# Patient Record
Sex: FEMALE | Race: BLACK OR AFRICAN AMERICAN | NOT HISPANIC OR LATINO | Employment: OTHER | URBAN - METROPOLITAN AREA
[De-identification: names, ages, dates, MRNs, and addresses within clinical notes are randomized per-mention and may not be internally consistent; named-entity substitution may affect disease eponyms.]

---

## 2018-11-08 ENCOUNTER — OFFICE VISIT (OUTPATIENT)
Dept: FAMILY MEDICINE | Facility: CLINIC | Age: 73
End: 2018-11-08
Payer: MEDICARE

## 2018-11-08 ENCOUNTER — HOSPITAL ENCOUNTER (OUTPATIENT)
Dept: RADIOLOGY | Facility: HOSPITAL | Age: 73
Discharge: HOME | End: 2018-11-08
Attending: NURSE PRACTITIONER
Payer: MEDICARE

## 2018-11-08 ENCOUNTER — OFFICE VISIT (OUTPATIENT)
Dept: ORTHOPEDICS | Facility: CLINIC | Age: 73
End: 2018-11-08
Payer: MEDICARE

## 2018-11-08 VITALS
OXYGEN SATURATION: 100 % | WEIGHT: 193.56 LBS | HEART RATE: 89 BPM | HEIGHT: 62 IN | SYSTOLIC BLOOD PRESSURE: 142 MMHG | DIASTOLIC BLOOD PRESSURE: 78 MMHG | BODY MASS INDEX: 35.62 KG/M2

## 2018-11-08 VITALS — BODY MASS INDEX: 35.51 KG/M2 | HEIGHT: 62 IN | WEIGHT: 193 LBS

## 2018-11-08 DIAGNOSIS — M25.561 ACUTE PAIN OF BOTH KNEES: ICD-10-CM

## 2018-11-08 DIAGNOSIS — Z78.0 POSTMENOPAUSAL: ICD-10-CM

## 2018-11-08 DIAGNOSIS — M25.562 ACUTE PAIN OF BOTH KNEES: ICD-10-CM

## 2018-11-08 DIAGNOSIS — E66.09 CLASS 2 OBESITY DUE TO EXCESS CALORIES WITHOUT SERIOUS COMORBIDITY WITH BODY MASS INDEX (BMI) OF 35.0 TO 35.9 IN ADULT: ICD-10-CM

## 2018-11-08 DIAGNOSIS — E78.49 OTHER HYPERLIPIDEMIA: ICD-10-CM

## 2018-11-08 DIAGNOSIS — R73.09 ABNORMAL GLUCOSE: Primary | ICD-10-CM

## 2018-11-08 DIAGNOSIS — M17.0 PRIMARY OSTEOARTHRITIS OF BOTH KNEES: ICD-10-CM

## 2018-11-08 DIAGNOSIS — Z23 NEED FOR PROPHYLACTIC VACCINATION AGAINST STREPTOCOCCUS PNEUMONIAE (PNEUMOCOCCUS) AND INFLUENZA: ICD-10-CM

## 2018-11-08 DIAGNOSIS — R63.5 ABNORMAL WEIGHT GAIN: ICD-10-CM

## 2018-11-08 DIAGNOSIS — I10 ESSENTIAL HYPERTENSION: ICD-10-CM

## 2018-11-08 DIAGNOSIS — M17.0 BILATERAL PRIMARY OSTEOARTHRITIS OF KNEE: Primary | ICD-10-CM

## 2018-11-08 PROCEDURE — 90670 PCV13 VACCINE IM: CPT | Mod: S$GLB,,, | Performed by: FAMILY MEDICINE

## 2018-11-08 PROCEDURE — 73564 X-RAY EXAM KNEE 4 OR MORE: CPT | Mod: 26,RT,, | Performed by: RADIOLOGY

## 2018-11-08 PROCEDURE — 73564 X-RAY EXAM KNEE 4 OR MORE: CPT | Mod: TC,50,PO

## 2018-11-08 PROCEDURE — 90662 IIV NO PRSV INCREASED AG IM: CPT | Mod: S$GLB,,, | Performed by: FAMILY MEDICINE

## 2018-11-08 PROCEDURE — 99999 PR PBB SHADOW E&M-EST. PATIENT-LVL II: CPT | Mod: PBBFAC,,, | Performed by: NURSE PRACTITIONER

## 2018-11-08 PROCEDURE — 20610 DRAIN/INJ JOINT/BURSA W/O US: CPT | Mod: 50,S$GLB,, | Performed by: NURSE PRACTITIONER

## 2018-11-08 PROCEDURE — 99999 PR PBB SHADOW E&M-NEW PATIENT-LVL IV: CPT | Mod: PBBFAC,,, | Performed by: FAMILY MEDICINE

## 2018-11-08 PROCEDURE — 1101F PT FALLS ASSESS-DOCD LE1/YR: CPT | Mod: CPTII,S$GLB,, | Performed by: FAMILY MEDICINE

## 2018-11-08 PROCEDURE — 73564 X-RAY EXAM KNEE 4 OR MORE: CPT | Mod: 26,LT,, | Performed by: RADIOLOGY

## 2018-11-08 PROCEDURE — 99499 UNLISTED E&M SERVICE: CPT | Mod: S$GLB,,, | Performed by: FAMILY MEDICINE

## 2018-11-08 PROCEDURE — 99203 OFFICE O/P NEW LOW 30 MIN: CPT | Mod: 25,S$GLB,, | Performed by: NURSE PRACTITIONER

## 2018-11-08 PROCEDURE — 99204 OFFICE O/P NEW MOD 45 MIN: CPT | Mod: S$GLB,,, | Performed by: FAMILY MEDICINE

## 2018-11-08 PROCEDURE — G0009 ADMIN PNEUMOCOCCAL VACCINE: HCPCS | Mod: S$GLB,,, | Performed by: FAMILY MEDICINE

## 2018-11-08 PROCEDURE — G0008 ADMIN INFLUENZA VIRUS VAC: HCPCS | Mod: S$GLB,,, | Performed by: FAMILY MEDICINE

## 2018-11-08 PROCEDURE — 3077F SYST BP >= 140 MM HG: CPT | Mod: CPTII,S$GLB,, | Performed by: FAMILY MEDICINE

## 2018-11-08 PROCEDURE — 3078F DIAST BP <80 MM HG: CPT | Mod: CPTII,S$GLB,, | Performed by: FAMILY MEDICINE

## 2018-11-08 RX ORDER — IBUPROFEN 800 MG/1
800 TABLET ORAL EVERY 6 HOURS PRN
COMMUNITY
End: 2019-08-27

## 2018-11-08 RX ORDER — ATORVASTATIN CALCIUM 80 MG/1
80 TABLET, FILM COATED ORAL DAILY
Refills: 1 | COMMUNITY
Start: 2018-10-04 | End: 2018-11-08

## 2018-11-08 RX ORDER — LOSARTAN POTASSIUM 25 MG/1
25 TABLET ORAL DAILY
Qty: 90 TABLET | Refills: 3 | Status: SHIPPED | OUTPATIENT
Start: 2018-11-08 | End: 2019-02-08

## 2018-11-08 RX ORDER — TRIAMCINOLONE ACETONIDE 40 MG/ML
40 INJECTION, SUSPENSION INTRA-ARTICULAR; INTRAMUSCULAR
Status: DISCONTINUED | OUTPATIENT
Start: 2018-11-08 | End: 2018-11-08 | Stop reason: HOSPADM

## 2018-11-08 RX ORDER — METFORMIN HYDROCHLORIDE 850 MG/1
TABLET ORAL
Refills: 1 | COMMUNITY
Start: 2018-10-03 | End: 2018-11-08 | Stop reason: ALTCHOICE

## 2018-11-08 RX ORDER — ATORVASTATIN CALCIUM 10 MG/1
10 TABLET, FILM COATED ORAL NIGHTLY
Qty: 90 TABLET | Refills: 3 | Status: SHIPPED | OUTPATIENT
Start: 2018-11-08 | End: 2019-11-18 | Stop reason: SDUPTHER

## 2018-11-08 RX ORDER — LOSARTAN POTASSIUM AND HYDROCHLOROTHIAZIDE 12.5; 1 MG/1; MG/1
1 TABLET ORAL DAILY
Refills: 1 | COMMUNITY
Start: 2018-10-03 | End: 2018-11-08 | Stop reason: DRUGHIGH

## 2018-11-08 RX ADMIN — TRIAMCINOLONE ACETONIDE 40 MG: 40 INJECTION, SUSPENSION INTRA-ARTICULAR; INTRAMUSCULAR at 09:11

## 2018-11-08 NOTE — Clinical Note
Patient had a colonoscopy 2-3 years ago, the patient stated that nothing was found and Dr. Lira, her primary care physician ordered it at [facility].  She also had a mammogram in May 2018 and was normal per patient, she also had this test done at [facility].

## 2018-11-08 NOTE — LETTER
November 8, 2018      Shima Rocha MD  [address] [facility] [address] [place] [address]           [facility]  [address] [facility] [address] [place] [address]  Phone: [phone]          Patient: Shawanda Desir   MR Number: [id number]   YOB: 1945   Date of Visit: 11/8/2018       Dear Dr. Shima Rocha:    Thank you for referring Shawanda Desir to me for evaluation. Attached you will find relevant portions of my assessment and plan of care.    If you have questions, please do not hesitate to call me. I look forward to following Shawanda Desir along with you.    Sincerely,    Tali Arciniega, APRN    Enclosure  CC:  No Recipients    If you would like to receive this communication electronically, please contact [email or web address] or [phone] to request more information on [facility] Link access.    For providers and/or their staff who would like to refer a patient to [facility], please contact us through our one-stop-shop provider referral line, Dayron Grant, at [phone].    If you feel you have received this communication in error or would no longer like to receive these types of communications, please e-mail [email or web address]

## 2018-11-08 NOTE — PROGRESS NOTES
Patient, Shawanda Desir (MRN #[id number]), presented with a recorded BMI of 35.4 kg/m^2 and a documented comorbidity(s):  - Hypertension  - Hyperlipidemia  to which the severe obesity is a contributing factor. This is consistent with the definition of severe obesity (BMI 35.0-35.9) with comorbidity (ICD-10 E66.01, Z68.35). The patient's severe obesity was monitored, evaluated, addressed and/or treated. This addendum to the medical record is made on 11/08/2018.

## 2018-11-08 NOTE — PROGRESS NOTES
DATE: 11/8/2018  PATIENT: Shawanda Desir  REFERRING MD:   CHIEF COMPLAINT: No chief complaint on file.      HISTORY:  Shawanda Desir is a 73 y.o. female  who presents for initial evaluation of her bilateral knee pain. She is a new patient to me, referred by Dr Shima Rocha for orthopedic evaluation.  She complains she has pain 7/10 in her bilateral knees, the right is worse than the left.  She denies injury or increase in activity.  She has had knee pain in the past and had an xray and cortisone injection in 2011 which she reports provided moderate relief.  She has taken ibuprofen but it is no longer providing relief.  She would like to avoid surgery.  She is requesting bilateral cortisone injections today.    PAST MEDICAL/SURGICAL HISTORY:  Past Medical History:   Diagnosis Date    Hypertension      Past Surgical History:   Procedure Laterality Date    BACK SURGERY      CERVICAL SPINE SURGERY         Current Medications:   Current Outpatient Medications:     atorvastatin (LIPITOR) 10 MG tablet, Take 1 tablet (10 mg total) by mouth every evening., Disp: 90 tablet, Rfl: 3    ibuprofen (ADVIL,MOTRIN) 800 MG tablet, Take 800 mg by mouth every 6 (six) hours as needed for Pain., Disp: , Rfl:     losartan (COZAAR) 25 MG tablet, Take 1 tablet (25 mg total) by mouth once daily., Disp: 90 tablet, Rfl: 3    Family History: family history was reviewed and is noncontributory  Social History:   Social History     Socioeconomic History    Marital status:      Spouse name: Not on file    Number of children: Not on file    Years of education: Not on file    Highest education level: Not on file   Social Needs    Financial resource strain: Not on file    Food insecurity - worry: Not on file    Food insecurity - inability: Not on file    Transportation needs - medical: Not on file    Transportation needs - non-medical: Not on file   Occupational History    Not on file   Tobacco Use    Smoking status: Never  "Smoker   Substance and Sexual Activity    Alcohol use: No     Frequency: Never    Drug use: No    Sexual activity: Not on file   Other Topics Concern    Not on file   Social History Narrative    Not on file       ROS:  Constitution: Negative for chills, fever, and sweats. Negative for unexplained weight loss.  HENT: Negative for headaches and blurry vision.   Cardiovascular: Negative for chest pain, irregular heartbeat, leg swelling and palpitations.   Respiratory: Negative for cough and shortness of breath.   Gastrointestinal: Negative for abdominal pain, heartburn, nausea and vomiting.   Genitourinary: Negative for bladder incontinence and dysuria.   Musculoskeletal: Negative for systemic arthritis, joint swelling, muscle weakness and myalgias.   Neurological: Negative for numbness.   Psychiatric/Behavioral: Negative for depression.   Endocrine: Negative for polyuria.   Hematologic/Lymphatic: Negative for bleeding disorders.  Skin: Negative for poor wound healing.       PHYSICAL EXAM:  Ht 5' 2" (1.575 m)   Wt 87.5 kg (193 lb)   BMI 35.30 kg/m²   Shawanda Desir is a well developed, well nourished female in no acute distress. Physical examination of the bilateral knee evaluated the following:    Gait and Alignment  Inspection for ecchymosis, swelling, atrophy, or deformity  Inspection for intra-articular and/or bursal effusions  Tenderness to palpation over the bony and soft tissue structures around the knee  Range of Motion and presence of extensor lag/contractures  Sensation and motor strength  Varus/valgus or anterior/posterior/rotatory instability  Flexion pinch and Aide's Tests  Patellar alignment/tracking/pain to palpation  Vascular exam to include skin temperature/color/capillary refill    Remarkable findings included:  Mild edema bilaterally, bony arthritic enlargement  nontender to palpation  ROM 0-130 degrees flexion  Sensation intact  Skin warm, dry, intact    IMAGING:   X-ray obtained " Bilateral knee performed today personally reviewed with patient. Radiologist report as follows:   There is advanced degenerative arthrosis of the medial compartments of both knees with severe joint space narrowing and periarticular osteophyte formation.  There is also degenerative arthrosis of the lateral compartment of the right knee with joint space narrowing and periarticular osteophyte formation.  There are periarticular osteophytes of the lateral compartment of the left knee there is relatively severe degenerative arthrosis of the bilateral patellofemoral articulations with patellofemoral space narrowing and periarticular osteophyte formation.  No fracture or subluxation are identified.  There are no signs of joint effusion on either side.    ASSESSMENT:   Bilateral severe osteoarthrosis    PLAN:  The nature of the diagnosis, using models and diagrams when appropriate, was explained to the patient in detail. Treatment option discussed included non-operative measures of rest, modification of activities, application of ice, elevation of extremity, compression, over the counter pain/antiinflammatory relief, physical therapy, cortisone injection, or visco supplementation.  More aggressive treatment options include referral for arthroplasty.  All questions answered and the patient wishes to proceed today with bilateral cortisone injections (see procedure documentation).  She will call for follow up if no improvement or worsening of symptoms.

## 2018-11-08 NOTE — PROCEDURES
Large Joint Aspiration/Injection: R knee, L knee  Date/Time: 11/8/2018 9:45 AM  Performed by: KELECHI oCwart  Authorized by: KELECHI Cowart     Consent Done?:  Yes (Verbal)  Indications:  Pain and joint swelling  Timeout: Prior to procedure the correct patient, procedure, and site was verified      Location:  Knee  Site:  R knee and L knee  Prep: Patient was prepped and draped in usual sterile fashion    Ultrasonic Guidance for needle placement: No  Needle size:  22 G  Approach:  Anterolateral  Medications:  40 mg triamcinolone acetonide 40 mg/mL; 40 mg triamcinolone acetonide 40 mg/mL  Patient tolerance:  Patient tolerated the procedure well with no immediate complications

## 2018-11-08 NOTE — PATIENT INSTRUCTIONS
"  Understanding Body Mass Index (BMI)  Body mass index (BMI) is a method of screening for a weight category using the ratio of your height to your weight. The BMI is a measure of overweight that is corrected for height. Knowing your BMI is a way to tell if you are at a healthy weight. The higher your BMI, the greater your risk for weight-related health problems.  What BMI means  · BMI below 18.5: Underweight  · BMI 18.5 to 24.9: Healthy weight or "ideal body weight"   · BMI 25 to 29.9: Overweight  · BMI 30 and over: Obese  · BMI 40 and over: Severe obesity   Online BMI Calculators  Find your BMI with an online BMI calculator tool, such as these from the CDC:  · BMI calculator for adults  · BMI calculator for children and teens   Using the BMI chart  To figure out your BMI, find your height and weight (or the numbers closest to them) on the table below. Follow each column of numbers to where your height and weight meet on the table. That is your BMI.    Date Last Reviewed: 7/1/2016 © 2000-2017 [employer]. [address] [place] [address]. All rights reserved. This information is not intended as a substitute for professional medical care. Always follow your healthcare professional's instructions.        Low-Salt Diet  This diet removes foods that are high in salt. It also limits the amount of salt you use when cooking. It is most often used for people with high blood pressure, edema (fluid retention), and kidney, liver, or heart disease.  Table salt contains the mineral sodium. Your body needs sodium to work normally. But too much sodium can make your health problems worse. Your healthcare provider is recommending a low-salt (also called low-sodium) diet for you. Your total daily allowance of salt is 1,500 to 2,300 milligrams (mg). It is less than 1 teaspoon of table salt. This means you can have only about 500 to 700 mg of sodium at each meal. People with certain health problems should limit " salt intake to the lower end of the recommended range.    When you cook, dont add much salt. If you can cook without using salt, even better. Dont add salt to your food at the table.  When shopping, read food labels. Salt is often called sodium on the label. Choose foods that are salt-free, low salt, or very low salt. Note that foods with reduced salt may not lower your salt intake enough.    Beans, potatoes, and pasta  Ok: Dry beans, split peas, lentils, potatoes, rice, macaroni, pasta, spaghetti without added salt  Avoid: Potato chips, tortilla chips, and similar products  Breads and cereals  Ok: Low-sodium breads, rolls, cereals, and cakes; low-salt crackers, matzo crackers  Avoid: Salted crackers, pretzels, popcorn, Lao toast, pancakes, muffins  Dairy  Ok: Milk, chocolate milk, hot chocolate mix, low-salt cheeses, and yogurt  Avoid: Processed cheese and cheese spreads; Roquefort, Camembert, and cottage cheese; buttermilk, instant breakfast drink  Desserts  Ok: Ice cream, frozen yogurt, juice bars, gelatin, cookies and pies, sugar, honey, jelly, hard candy  Avoid: Most pies, cakes and cookies prepared or processed with salt; instant pudding  Drinks  Ok: Tea, coffee, fizzy (carbonated) drinks, juices  Avoid: Flavored coffees, electrolyte replacement drinks, sports drinks  Meats  Ok: All fresh meat, fish, poultry, low-salt tuna, eggs, egg substitute  Avoid: Smoked, pickled, brine-cured, or salted meats and fish. This includes salomon, chipped beef, corned beef, hot dogs, deli meats, ham, kosher meats, salt pork, sausage, canned tuna, salted codfish, smoked salmon, herring, sardines, or anchovies.  Seasonings and spices  Ok: Most seasonings are okay. Good substitutes for salt include: fresh herb blends, hot sauce, lemon, garlic, padilla, vinegar, dry mustard, parsley, cilantro, horseradish, tomato paste, regular margarine, mayonnaise, unsalted butter, cream cheese, vegetable oil, cream, low-salt salad dressing and  gravy.  Avoid: Regular ketchup, relishes, pickles, soy sauce, teriyaki sauce, Worcestershire sauce, BBQ sauce, tartar sauce, meat tenderizer, chili sauce, regular gravy, regular salad dressing, salted butter  Soups  Ok: Low-salt soups and broths made with allowed foods  Avoid: Bouillon cubes, soups with smoked or salted meats, regular soup and broth  Vegetables  Ok: Most vegetables are okay; also low-salt tomato and vegetable juices  Avoid: Sauerkraut and other brine-soaked vegetables; pickles and other pickled vegetables; tomato juice, olives  Date Last Reviewed: 8/1/2016 © 2000-2017 [employer]. [address] [place] [address]. All rights reserved. This information is not intended as a substitute for professional medical care. Always follow your healthcare professional's instructions.        Eating Heart-Healthy Food: Using the DASH Plan    Eating for your heart doesnt have to be hard or boring. You just need to know how to make healthier choices. The DASH eating plan has been developed to help you do just that. DASH stands for Dietary Approaches to Stop Hypertension. It is a plan that has been proven to be healthier for your heart and to lower your risk for high blood pressure. It can also help lower your risk for cancer, heart disease, osteoporosis, and diabetes.  Choosing from each food group  Choose foods from each of the food groups below each day. Try to get the recommended number of servings for each food group. The serving numbers are based on a diet of 2,000 calories a day. Talk to your doctor if youre unsure about your calorie needs. Along with getting the correct servings, the DASH plan also recommends a sodium intake less than 2,300 mg per day.        Grains  Servings: 6 to 8 a day  A serving is:  · 1 slice bread  · 1 ounce dry cereal  · Half a cup cooked rice, pasta or cereal  Best choices: Whole grains and any grains high in fiber. Vegetables  Servings: 4 to 5 a day  A  serving is:  · 1 cup raw leafy vegetable  · Half a cup cut-up raw or cooked vegetable  · Half a cup vegetable juice  Best choices: Fresh or frozen vegetables prepared without added salt or fat.   Fruits  Servings: 4 to 5 a day  A serving is:  · 1 medium fruit  · One-quarter cup dried fruit  · Half a cup fresh, frozen, or canned fruit  · Half a cup of 100% fruit juices  Best choices: A variety of fresh fruits of different colors. Whole fruits are a better choice than fruit juices. Low-fat or fat-free dairy  Servings: 2 to 3 a day  A serving is:  · 1 cup milk  · 1 cup yogurt  · One and a half ounces cheese  Best choices: Skim or 1% milk, low-fat or fat-free yogurt or buttermilk, and low-fat cheeses.         Lean meats, poultry, fish  Servings: 6 or fewer a day  A serving is:  · 1 ounce cooked meats, poultry, or fish  · 1 egg  Best choices: Lean poultry and fish. Trim away visible fat. Broil, grill, roast, or boil instead of frying. Remove skin from poultry before eating. Limit how much red meat you eat.  Nuts, seeds, beans  Servings: 4 to 5 a week  A serving is:  · One-third cup nuts (one and a half ounces)  · 2 tablespoons nut butter or seeds  · Half a cup cooked dry beans or legumes  Best choices: Dry roasted nuts with no salt added, lentils, kidney beans, garbanzo beans, and whole bennett beans.   Fats and oils  Servings: 2 to 3 a day  A serving is:  · 1 teaspoon vegetable oil  · 1 teaspoon soft margarine  · 1 tablespoon mayonnaise  · 2 tablespoons salad dressing  Best choices: Nut and vegetable oils (nontropical vegetable oils), such as olive and canola oil. Sweets  Servings: 5 a week or fewer  A serving is:  · 1 tablespoon sugar, maple syrup, or honey  · 1 tablespoon jam or jelly  · 1 half-ounce jelly beans (about 15)  · 1 cup lemonade  Best choices: Dried fruit can be a satisfying sweet. Choose low-fat sweets. And watch your serving sizes!      For more on the DASH eating plan,  visit:  www.nhlbi.nih.gov/health/health-topics/topics/dash   Date Last Reviewed: 6/1/2016  © 4605-9115 [employer]. [address] [place] [address], [place] [address]. All rights reserved. This information is not intended as a substitute for professional medical care. Always follow your healthcare professional's instructions.

## 2018-11-08 NOTE — PROGRESS NOTES
Subjective:       Patient ID: Shawanda Desir is a 73 y.o. female.    Chief Complaint: Establish Care    Hypertension   This is a chronic problem. The current episode started more than 1 year ago. The problem has been gradually worsening since onset. The problem is uncontrolled. Pertinent negatives include no anxiety, blurred vision, chest pain, headaches, malaise/fatigue, neck pain, orthopnea, palpitations, peripheral edema, PND, shortness of breath or sweats. There are no associated agents to hypertension. Risk factors for coronary artery disease include dyslipidemia, obesity and post-menopausal state. Past treatments include diuretics and angiotensin blockers. The current treatment provides moderate improvement. Compliance problems include medication side effects, diet and exercise.  There is no history of angina, kidney disease, CVA, heart failure or PVD. There is no history of chronic renal disease or a hypertension causing med.   Hyperlipidemia   This is a chronic problem. The current episode started more than 1 year ago. The problem is uncontrolled. Exacerbating diseases include obesity. She has no history of chronic renal disease, diabetes, hypothyroidism, liver disease or nephrotic syndrome. There are no known factors aggravating her hyperlipidemia. Pertinent negatives include no chest pain, focal sensory loss, focal weakness, leg pain, myalgias or shortness of breath. The current treatment provides moderate improvement of lipids. There are no compliance problems.  Risk factors for coronary artery disease include hypertension, post-menopausal and obesity.   Knee Pain    The pain is present in the right knee and left knee. The quality of the pain is described as aching. The pain is severe. The pain has been intermittent since onset. Associated symptoms include an inability to bear weight. Pertinent negatives include no muscle weakness or tingling. She reports no foreign bodies present. The symptoms are  aggravated by movement and weight bearing. She has tried NSAIDs for the symptoms. The treatment provided moderate relief.      The patient stated that he was diagnosed with abnormal glucose and was put on metformin 850 mg 1.5 tablets twice daily with meals, she states that the medication makes her feel sick and she stop taking it.  She actually stopped taking all the medications for cholesterol and blood pressure approximately 2 weeks ago and she feels much better.    Past medical history, past social history, past surgical history, past family history was reviewed discussed with the patient.    Review of Systems   Constitutional: Positive for unexpected weight change. Negative for activity change, appetite change and malaise/fatigue.   HENT: Negative for congestion and ear discharge.    Eyes: Negative for blurred vision, discharge and itching.   Respiratory: Negative for choking, chest tightness and shortness of breath.    Cardiovascular: Negative for chest pain, palpitations, orthopnea, leg swelling and PND.   Gastrointestinal: Negative for abdominal distention and abdominal pain.   Endocrine: Negative for cold intolerance and heat intolerance.   Genitourinary: Negative for dysuria and flank pain.   Musculoskeletal: Positive for arthralgias (Bilateral knee pain). Negative for back pain, myalgias and neck pain.   Skin: Negative for pallor and rash.   Allergic/Immunologic: Negative for environmental allergies and food allergies.   Neurological: Negative for dizziness, tingling, focal weakness, facial asymmetry and headaches.   Hematological: Negative for adenopathy. Does not bruise/bleed easily.   Psychiatric/Behavioral: Negative for agitation and confusion.       Objective:      Physical Exam   Constitutional: She appears well-developed and well-nourished. No distress.   HENT:   Head: Normocephalic and atraumatic.   Right Ear: External ear normal.   Left Ear: External ear normal.   Nose: Nose normal.    Mouth/Throat: Oropharynx is clear and moist. No oropharyngeal exudate.   Eyes: Conjunctivae are normal. Pupils are equal, round, and reactive to light. Right eye exhibits no discharge. Left eye exhibits no discharge.   Neck: Neck supple. No thyromegaly present.   Cardiovascular: Normal rate, regular rhythm, normal heart sounds and intact distal pulses.   No murmur heard.  Pulmonary/Chest: Effort normal and breath sounds normal. No respiratory distress. She has no wheezes.   Abdominal: She exhibits no distension. There is no tenderness.   Musculoskeletal: She exhibits tenderness (Bilateral knees with tenderness to palpation and decreased range of motion right is worse than the left). She exhibits no deformity.   Neurological: No cranial nerve deficit. Coordination normal.   Skin: She is not diaphoretic. No erythema. No pallor.   Psychiatric: She has a normal mood and affect. Her behavior is normal. Judgment and thought content normal.   Nursing note and vitals reviewed.      Assessment:       1. Abnormal glucose    2. Other hyperlipidemia    3. Essential hypertension    4. Primary osteoarthritis of both knees    5. Abnormal weight gain    6. Postmenopausal    7. BMI 35.0-35.9,adult    8. Class 2 obesity due to excess calories without serious comorbidity with body mass index (BMI) of 35.0 to 35.9 in adult        Plan:       Abnormal glucose:  New problem, workup needed  -     Hemoglobin A1c; Future; Expected date: 11/09/2018  -     INSULIN, RANDOM; Future; Expected date: 11/09/2018    Other hyperlipidemia:  Uncontrolled  -     Comprehensive metabolic panel; Future; Expected date: 11/09/2018  -     Lipid panel; Future; Expected date: 11/09/2018  -     atorvastatin (LIPITOR) 10 MG tablet; Take 1 tablet (10 mg total) by mouth every evening.  Dispense: 90 tablet; Refill: 3    Essential hypertension:  Uncontrolled  -     Lipid panel; Future; Expected date: 11/09/2018  -     Microalbumin/creatinine urine ratio; Future;  Expected date: 11/09/2018  -     losartan (COZAAR) 25 MG tablet; Take 1 tablet (25 mg total) by mouth once daily.  Dispense: 90 tablet; Refill: 3    Primary osteoarthritis of both knees:  Worsening  -     Ambulatory referral to Orthopedics  -     Uric acid; Future; Expected date: 11/09/2018  -     CBC auto differential; Future; Expected date: 11/09/2018    Abnormal weight gain:  Worsening  -     TSH; Future; Expected date: 11/09/2018    Postmenopausal:  Worsening  -     DXA Bone Density Spine And Hip; Future; Expected date: 11/08/2018    BMI 35.0-35.9,adult:  Worsening    Class 2 obesity due to excess calories without serious comorbidity with body mass index (BMI) of 35.0 to 35.9 in adult:  Worsening  -     TSH; Future; Expected date: 11/09/2018  -     CBC auto differential; Future; Expected date: 11/09/2018    Need for prophylactic vaccination against Streptococcus pneumoniae (pneumococcus) and influenza  -     (In Office Administered) Pneumococcal Conjugate Vaccine (13 Valent) (IM)  -     Influenza - High Dose (65+) (PF) (IM)    Healthy eating habits, avoid fried foods, red meat and processed starches, decrease carbohydrate intake, 3 meals a day, 3 snacks and small portions, increase physical activity as tolerated at least 30 min of exercise 5 times a week if possible pool exercises secondary to osteoarthritis of the knees.  Will start patient on losartan 25 mg 1 tablet p.o. q.day and atorvastatin 10 mg 1 tablet p.o. Q.h.s. will call the patient after we have the results of the test.  The patient's BMI has been recorded in the chart. The patient has been provided educational materials regarding the benefits of attaining and maintaining a normal weight. We will continue to address and follow this issue during follow up visits.Patient agreed with assessment and plan. Patient verbalized understanding.

## 2018-11-09 ENCOUNTER — HOSPITAL ENCOUNTER (OUTPATIENT)
Dept: RADIOLOGY | Facility: HOSPITAL | Age: 73
Discharge: HOME | End: 2018-11-09
Attending: FAMILY MEDICINE
Payer: MEDICARE

## 2018-11-09 ENCOUNTER — TELEPHONE (OUTPATIENT)
Dept: ADMINISTRATIVE | Facility: HOSPITAL | Age: 73
End: 2018-11-09

## 2018-11-09 DIAGNOSIS — Z78.0 POSTMENOPAUSAL: ICD-10-CM

## 2018-11-09 PROCEDURE — 77081 DXA BONE DENSITY APPENDICULR: CPT | Mod: TC,PO

## 2018-11-09 PROCEDURE — 77081 DXA BONE DENSITY APPENDICULR: CPT | Mod: 26,,, | Performed by: RADIOLOGY

## 2018-11-09 NOTE — TELEPHONE ENCOUNTER
----- Message from Shima Rocha MD sent at 11/8/2018 10:32 AM CST -----  Patient had a colonoscopy 2-3 years ago, the patient stated that nothing was found and Dr. Lira, her primary care physician ordered it at [facility].  She also had a mammogram in May 2018 and was normal per patient, she also had this test done at [facility].

## 2018-11-12 DIAGNOSIS — D72.828 OTHER ELEVATED WHITE BLOOD CELL (WBC) COUNT: Primary | ICD-10-CM

## 2018-11-12 DIAGNOSIS — R74.8 ALKALINE PHOSPHATASE RAISED: ICD-10-CM

## 2018-11-12 RX ORDER — DOXYCYCLINE 100 MG/1
100 CAPSULE ORAL EVERY 12 HOURS
Qty: 14 CAPSULE | Refills: 0 | Status: SHIPPED | OUTPATIENT
Start: 2018-11-12 | End: 2018-11-13 | Stop reason: SDUPTHER

## 2018-11-13 ENCOUNTER — TELEPHONE (OUTPATIENT)
Dept: FAMILY MEDICINE | Facility: CLINIC | Age: 73
End: 2018-11-13

## 2018-11-13 RX ORDER — DOXYCYCLINE 100 MG/1
100 CAPSULE ORAL EVERY 12 HOURS
Qty: 14 CAPSULE | Refills: 0 | Status: SHIPPED | OUTPATIENT
Start: 2018-11-13 | End: 2018-11-20

## 2018-11-13 NOTE — TELEPHONE ENCOUNTER
Spoke with pt;  Discussed lab findings and recommendations for healthy food choices;  Pt verbalizes understanding;  Pt rx (doxycycline and linagliptin) were sent to [facility];  Pt requests these medications be sent to [facility] on [address] in [place];  Pt states doxycycline was $140 at [facility];  If the medication in not more affordable at [facility] she will request an alternative medication

## 2018-11-13 NOTE — TELEPHONE ENCOUNTER
----- Message from Anastasia Parra sent at 11/13/2018 10:12 AM CST -----  Contact: Shawanda  Type:  Patient Returning Call  Who Called:  Patient  Who Left Message for Patient:  Unsure  Does the patient know what this is regarding?:  Lab results  Best Call Back Number:  [phone]  Additional Information:  Na  Thank you

## 2018-11-16 ENCOUNTER — TELEPHONE (OUTPATIENT)
Dept: FAMILY MEDICINE | Facility: CLINIC | Age: 73
End: 2018-11-16

## 2018-11-16 NOTE — TELEPHONE ENCOUNTER
----- Message from Nery Burrows sent at 11/16/2018  9:26 AM CST -----  Please call pt at [phone] / will not be available till Monday / having to go to a service out of town ? She just needs to know what her results were ? Confused about her medications

## 2018-11-20 ENCOUNTER — LAB VISIT (OUTPATIENT)
Dept: LAB | Facility: HOSPITAL | Age: 73
End: 2018-11-20
Attending: FAMILY MEDICINE
Payer: MEDICARE

## 2018-11-20 DIAGNOSIS — R74.8 ALKALINE PHOSPHATASE RAISED: ICD-10-CM

## 2018-11-20 DIAGNOSIS — D72.828 OTHER ELEVATED WHITE BLOOD CELL (WBC) COUNT: ICD-10-CM

## 2018-11-20 LAB
BASOPHILS # BLD AUTO: 0.04 K/UL
BASOPHILS NFR BLD: 0.4 %
DIFFERENTIAL METHOD: ABNORMAL
EOSINOPHIL # BLD AUTO: 0.4 K/UL
EOSINOPHIL NFR BLD: 3.3 %
ERYTHROCYTE [DISTWIDTH] IN BLOOD BY AUTOMATED COUNT: 14.4 %
GGT SERPL-CCNC: 53 U/L
HCT VFR BLD AUTO: 40 %
HGB BLD-MCNC: 12.3 G/DL
IMM GRANULOCYTES # BLD AUTO: 0.02 K/UL
IMM GRANULOCYTES NFR BLD AUTO: 0.2 %
LYMPHOCYTES # BLD AUTO: 3.8 K/UL
LYMPHOCYTES NFR BLD: 34.8 %
MCH RBC QN AUTO: 28.4 PG
MCHC RBC AUTO-ENTMCNC: 30.8 G/DL
MCV RBC AUTO: 92 FL
MONOCYTES # BLD AUTO: 0.8 K/UL
MONOCYTES NFR BLD: 6.9 %
NEUTROPHILS # BLD AUTO: 6 K/UL
NEUTROPHILS NFR BLD: 54.4 %
NRBC BLD-RTO: 0 /100 WBC
PLATELET # BLD AUTO: 263 K/UL
PMV BLD AUTO: 12.5 FL
RBC # BLD AUTO: 4.33 M/UL
WBC # BLD AUTO: 11 K/UL

## 2018-11-20 PROCEDURE — 82977 ASSAY OF GGT: CPT

## 2018-11-20 PROCEDURE — 85025 COMPLETE CBC W/AUTO DIFF WBC: CPT

## 2018-11-20 PROCEDURE — 36415 COLL VENOUS BLD VENIPUNCTURE: CPT | Mod: PO

## 2018-11-23 ENCOUNTER — TELEPHONE (OUTPATIENT)
Dept: FAMILY MEDICINE | Facility: CLINIC | Age: 73
End: 2018-11-23

## 2018-11-23 RX ORDER — CEFUROXIME AXETIL 500 MG/1
500 TABLET ORAL 2 TIMES DAILY
Qty: 20 TABLET | Refills: 0 | Status: SHIPPED | OUTPATIENT
Start: 2018-11-23 | End: 2018-12-03

## 2018-11-23 NOTE — TELEPHONE ENCOUNTER
----- Message from Jennifer Donovan sent at 11/23/2018 10:15 AM CST -----  Contact: self  Type:  Patient Returning Call    Who Called:  Patient :  #  Who Left Message for Patient Nurse   Does the patient know what this is regarding?:  Best Call Back Number: [phone] (home)     Additional Information:

## 2018-12-03 ENCOUNTER — OFFICE VISIT (OUTPATIENT)
Dept: OBSTETRICS AND GYNECOLOGY | Facility: CLINIC | Age: 73
End: 2018-12-03
Payer: MEDICARE

## 2018-12-03 VITALS — WEIGHT: 191.13 LBS | BODY MASS INDEX: 35.17 KG/M2 | HEIGHT: 62 IN

## 2018-12-03 DIAGNOSIS — N95.0 PMB (POSTMENOPAUSAL BLEEDING): Primary | ICD-10-CM

## 2018-12-03 PROCEDURE — 99999 PR PBB SHADOW E&M-EST. PATIENT-LVL III: CPT | Mod: PBBFAC,,, | Performed by: OBSTETRICS & GYNECOLOGY

## 2018-12-03 PROCEDURE — 1101F PT FALLS ASSESS-DOCD LE1/YR: CPT | Mod: CPTII,S$GLB,, | Performed by: OBSTETRICS & GYNECOLOGY

## 2018-12-03 PROCEDURE — 88305 TISSUE EXAM BY PATHOLOGIST: CPT | Performed by: PATHOLOGY

## 2018-12-03 PROCEDURE — 99204 OFFICE O/P NEW MOD 45 MIN: CPT | Mod: 25,S$GLB,, | Performed by: OBSTETRICS & GYNECOLOGY

## 2018-12-03 PROCEDURE — 58100 BIOPSY OF UTERUS LINING: CPT | Mod: S$GLB,,, | Performed by: OBSTETRICS & GYNECOLOGY

## 2018-12-03 PROCEDURE — 88305 TISSUE EXAM BY PATHOLOGIST: CPT | Mod: 26,,, | Performed by: PATHOLOGY

## 2018-12-03 RX ORDER — METFORMIN HYDROCHLORIDE 850 MG/1
850 TABLET ORAL DAILY
COMMUNITY
End: 2019-08-08

## 2018-12-03 NOTE — PROGRESS NOTES
"Chief Complaint   Patient presents with    bleeding from vagina    pain in both sides    having irregular bleeding       History of Present Illness   73 y.o. -American Female patient presents today for vaginal bleeding from ER, started 3 days ago, heavier yesterday, now resolved. No GYN history, no Hormone Replacement Therapy,  x 5. Left and right lower quadrant pains x 6 months on and off, negative work up with CT.    counseled on Risks, Benefits and Alternatives to Endometrial Biopsy, discused with patient in detail, all questions answered and patient agreed to proceed.       Past medical and surgical history reviewed.   I have reviewed the patient's medical history in detail and updated the computerized patient record.    Review of patient's allergies indicates:  No Known Allergies      Review of Systems - Negative except HPI  GEN ROS: negative for - chills or fever  Breast ROS: negative for breast lumps  Genito-Urinary ROS: no dysuria, trouble voiding, or hematuria      Physical Examination:  Ht 5' 2" (1.575 m)   Wt 86.7 kg (191 lb 2.2 oz)   BMI 34.96 kg/m²    Constitutional: She appears alert and responsive. She appears well-developed, well-groomed, and well-nourished. No distress. OverWeight   HENT:   Head: Normocephalic and atraumatic.   Eyes: Conjunctivae and EOM are normal. No scleral icterus.   Neck: Symmetrical. Normal range of motion. Neck supple. No tracheal deviation present. THYROID: without masses or tenderness.  Cardiovascular: Normal rate, no rhythm abnormality noted. Extremities without swelling or edema, warm.    Pulmonary/Chest: Normal respiratory Effort. No distress or retractions. She exhibits no tenderness.  Abdominal: Soft. She exhibits no distension, hernias or masses. There is no tenderness. No enlargement of liver edge or spleen.  There is no rebound and no guarding.   Genitourinary:    External rectal exam shows no thrombosed external hemorrhoids, no lesions.     Pelvic " exam was performed with patient supine.   No labial fusion, and symmetrical.    There is no rash, lesion or injury on the right labia.   There is no rash, lesion or injury on the left labia.   No bleeding and no signs of injury around the vaginal introitus, urethral meatus is normal size and without prolapse or lesions, urethra well supported. The cervix is visualized with no discharge, lesions or friability.   No vaginal discharge found.     Endometrial biopsy:  12/3/2018   Patient was prepped and draped in the usual fashion after verbal consent was obtained. Cervix was cleaned with betadine and endometrial pipelle was passed to a depth of 9cm without difficulty with moderate return of tissue.  Additional instrumentation needed: tenaculum    Tolerated well, specimen sent to pathology.    Patient informed will be contacted with results within 2 weeks. Encouraged to please call back or email if she has not heard from us by then.     No significant Cystocele, Enterocele or rectocele, and cervix and uterus well supported.   Bimanual exam:   The urethra is normal to palpation and there are no palpable vaginal wall masses.   Uterus is not deviated, not enlarged, not fixed, normal shape and not tender.   Cervix exhibits no motion tenderness.    Right adnexum displays no mass or nodularity and no tenderness.   Left adnexum displays no mass or nodularity and no tenderness.  Musculoskeletal: Normal range of motion.   Lymphadenopathy: No inguinal adenopathy present.   Neurological: She is alert and oriented to person, place, and time. Coordination normal.   Skin: Skin is warm and dry. She is not diaphoretic. No rashes, lesions or ulcers.   Psychiatric: She has a normal mood and affect, oriented to person, place, and time.              Assessment:  1. PMB (postmenopausal bleeding)  Tissue Specimen To Pathology, Obstetrics/Gynecology       Plan:  Get u/s from [facility]  Endometrial Biopsy today  Patient informed will be contacted  with results within 2 weeks. Encouraged to please call back or email if she has not heard from us by then.

## 2018-12-07 ENCOUNTER — TELEPHONE (OUTPATIENT)
Dept: OBSTETRICS AND GYNECOLOGY | Facility: CLINIC | Age: 73
End: 2018-12-07

## 2018-12-07 NOTE — TELEPHONE ENCOUNTER
----- Message from Mason Villa sent at 12/7/2018  9:35 AM CST -----  Contact: pt  Type:  Test Results    Who Called:  pt  Name of Test (Lab/Mammo/Etc):  biopsy  Date of Test:  12.3.18  Ordering Provider:  Dr Garcia  Where the test was performed:  The office  Best Call Back Number:  [phone]  Additional Information:   Pt was told she would be called yesterday with the results.    Calling for biopsy report, please advise

## 2018-12-28 ENCOUNTER — TELEPHONE (OUTPATIENT)
Dept: OBSTETRICS AND GYNECOLOGY | Facility: CLINIC | Age: 73
End: 2018-12-28

## 2018-12-28 NOTE — TELEPHONE ENCOUNTER
----- Message from Loan Escobedo sent at 12/28/2018 11:40 AM CST -----  Contact: Patient  Type:  Patient Returning Call    Who Called:  Patient   Who Left Message for Patient:  Cary  Does the patient know what this is regarding?:  Results   Best Call Back Number:    Additional Information:

## 2019-01-18 DIAGNOSIS — E11.9 TYPE 2 DIABETES MELLITUS WITHOUT COMPLICATION, UNSPECIFIED WHETHER LONG TERM INSULIN USE: ICD-10-CM

## 2019-02-08 ENCOUNTER — OFFICE VISIT (OUTPATIENT)
Dept: FAMILY MEDICINE | Facility: CLINIC | Age: 74
End: 2019-02-08
Payer: MEDICARE

## 2019-02-08 ENCOUNTER — LAB VISIT (OUTPATIENT)
Dept: LAB | Facility: HOSPITAL | Age: 74
End: 2019-02-08
Attending: FAMILY MEDICINE
Payer: MEDICARE

## 2019-02-08 VITALS
HEART RATE: 79 BPM | BODY MASS INDEX: 35.73 KG/M2 | SYSTOLIC BLOOD PRESSURE: 158 MMHG | OXYGEN SATURATION: 99 % | WEIGHT: 195.31 LBS | DIASTOLIC BLOOD PRESSURE: 74 MMHG

## 2019-02-08 DIAGNOSIS — G89.29 CHRONIC PAIN OF BOTH KNEES: ICD-10-CM

## 2019-02-08 DIAGNOSIS — E66.09 CLASS 2 OBESITY DUE TO EXCESS CALORIES WITHOUT SERIOUS COMORBIDITY WITH BODY MASS INDEX (BMI) OF 35.0 TO 35.9 IN ADULT: ICD-10-CM

## 2019-02-08 DIAGNOSIS — M25.562 CHRONIC PAIN OF BOTH KNEES: ICD-10-CM

## 2019-02-08 DIAGNOSIS — R80.9 TYPE 2 DIABETES MELLITUS WITH MICROALBUMINURIA, WITHOUT LONG-TERM CURRENT USE OF INSULIN: ICD-10-CM

## 2019-02-08 DIAGNOSIS — E78.49 OTHER HYPERLIPIDEMIA: ICD-10-CM

## 2019-02-08 DIAGNOSIS — E11.29 TYPE 2 DIABETES MELLITUS WITH MICROALBUMINURIA, WITHOUT LONG-TERM CURRENT USE OF INSULIN: ICD-10-CM

## 2019-02-08 DIAGNOSIS — R74.8 ALKALINE PHOSPHATASE RAISED: ICD-10-CM

## 2019-02-08 DIAGNOSIS — I10 ESSENTIAL HYPERTENSION: Primary | ICD-10-CM

## 2019-02-08 DIAGNOSIS — M25.561 CHRONIC PAIN OF BOTH KNEES: ICD-10-CM

## 2019-02-08 DIAGNOSIS — Z12.11 SCREENING FOR COLON CANCER: ICD-10-CM

## 2019-02-08 LAB
ALBUMIN SERPL BCP-MCNC: 3.7 G/DL
ALP SERPL-CCNC: 182 U/L
ALT SERPL W/O P-5'-P-CCNC: 16 U/L
ANION GAP SERPL CALC-SCNC: 10 MMOL/L
AST SERPL-CCNC: 19 U/L
BILIRUB SERPL-MCNC: 0.6 MG/DL
BUN SERPL-MCNC: 12 MG/DL
CALCIUM SERPL-MCNC: 10.4 MG/DL
CHLORIDE SERPL-SCNC: 103 MMOL/L
CHOLEST SERPL-MCNC: 220 MG/DL
CHOLEST/HDLC SERPL: 4.2 {RATIO}
CO2 SERPL-SCNC: 28 MMOL/L
CREAT SERPL-MCNC: 0.8 MG/DL
EST. GFR  (AFRICAN AMERICAN): >60 ML/MIN/1.73 M^2
EST. GFR  (NON AFRICAN AMERICAN): >60 ML/MIN/1.73 M^2
ESTIMATED AVG GLUCOSE: 151 MG/DL
GLUCOSE SERPL-MCNC: 125 MG/DL
HBA1C MFR BLD HPLC: 6.9 %
HDLC SERPL-MCNC: 53 MG/DL
HDLC SERPL: 24.1 %
LDLC SERPL CALC-MCNC: 149.4 MG/DL
NONHDLC SERPL-MCNC: 167 MG/DL
POTASSIUM SERPL-SCNC: 4.4 MMOL/L
PROT SERPL-MCNC: 8.2 G/DL
SODIUM SERPL-SCNC: 141 MMOL/L
TRIGL SERPL-MCNC: 88 MG/DL
URATE SERPL-MCNC: 6.5 MG/DL

## 2019-02-08 PROCEDURE — 99999 PR PBB SHADOW E&M-EST. PATIENT-LVL III: ICD-10-PCS | Mod: PBBFAC,,, | Performed by: FAMILY MEDICINE

## 2019-02-08 PROCEDURE — 99499 UNLISTED E&M SERVICE: CPT | Mod: S$GLB,,, | Performed by: FAMILY MEDICINE

## 2019-02-08 PROCEDURE — 3078F PR MOST RECENT DIASTOLIC BLOOD PRESSURE < 80 MM HG: ICD-10-PCS | Mod: CPTII,S$GLB,, | Performed by: FAMILY MEDICINE

## 2019-02-08 PROCEDURE — 80061 LIPID PANEL: CPT

## 2019-02-08 PROCEDURE — 80053 COMPREHEN METABOLIC PANEL: CPT

## 2019-02-08 PROCEDURE — 99499 RISK ADDL DX/OHS AUDIT: ICD-10-PCS | Mod: S$GLB,,, | Performed by: FAMILY MEDICINE

## 2019-02-08 PROCEDURE — 84550 ASSAY OF BLOOD/URIC ACID: CPT

## 2019-02-08 PROCEDURE — 3077F PR MOST RECENT SYSTOLIC BLOOD PRESSURE >= 140 MM HG: ICD-10-PCS | Mod: CPTII,S$GLB,, | Performed by: FAMILY MEDICINE

## 2019-02-08 PROCEDURE — 99214 PR OFFICE/OUTPT VISIT, EST, LEVL IV, 30-39 MIN: ICD-10-PCS | Mod: S$GLB,,, | Performed by: FAMILY MEDICINE

## 2019-02-08 PROCEDURE — 1101F PR PT FALLS ASSESS DOC 0-1 FALLS W/OUT INJ PAST YR: ICD-10-PCS | Mod: CPTII,S$GLB,, | Performed by: FAMILY MEDICINE

## 2019-02-08 PROCEDURE — 3045F PR MOST RECENT HEMOGLOBIN A1C LEVEL 7.0-9.0%: CPT | Mod: CPTII,S$GLB,, | Performed by: FAMILY MEDICINE

## 2019-02-08 PROCEDURE — 99999 PR PBB SHADOW E&M-EST. PATIENT-LVL III: CPT | Mod: PBBFAC,,, | Performed by: FAMILY MEDICINE

## 2019-02-08 PROCEDURE — 3077F SYST BP >= 140 MM HG: CPT | Mod: CPTII,S$GLB,, | Performed by: FAMILY MEDICINE

## 2019-02-08 PROCEDURE — 3078F DIAST BP <80 MM HG: CPT | Mod: CPTII,S$GLB,, | Performed by: FAMILY MEDICINE

## 2019-02-08 PROCEDURE — 83036 HEMOGLOBIN GLYCOSYLATED A1C: CPT

## 2019-02-08 PROCEDURE — 36415 COLL VENOUS BLD VENIPUNCTURE: CPT | Mod: PO

## 2019-02-08 PROCEDURE — 99214 OFFICE O/P EST MOD 30 MIN: CPT | Mod: S$GLB,,, | Performed by: FAMILY MEDICINE

## 2019-02-08 PROCEDURE — 3045F PR MOST RECENT HEMOGLOBIN A1C LEVEL 7.0-9.0%: ICD-10-PCS | Mod: CPTII,S$GLB,, | Performed by: FAMILY MEDICINE

## 2019-02-08 PROCEDURE — 1101F PT FALLS ASSESS-DOCD LE1/YR: CPT | Mod: CPTII,S$GLB,, | Performed by: FAMILY MEDICINE

## 2019-02-08 RX ORDER — LOSARTAN POTASSIUM 50 MG/1
50 TABLET ORAL DAILY
Qty: 90 TABLET | Refills: 3 | Status: SHIPPED | OUTPATIENT
Start: 2019-02-08 | End: 2019-03-25

## 2019-02-08 NOTE — MEDICAL/APP STUDENT
Subjective:       Patient ID: Shawanda Desir is a 73 y.o. female with DM and HTN.    Chief Complaint: Follow-up for DM, HTN    HPI   Patient reports compliance with medication.     She checks her BGL at home, and it's usually around 120 - 130 in the morning. She reports improving her diet by cutting out carbs and exercising.    She checks her BP at home, and it's usually systolic BP is 130 - 145. She took her HTN medication this morning. BP in office was 158/74. Her second BP reading in office was 160/100.     She reports she had right and left flank pain in October that she attributes to the Metformin medication. Once her dose was reduced, she reports improvement in pain. Her metformin dose is at 850 mg.       She reports knee pain and gets steroid shots. She reports improvement in her knee pain.    She needs an eye exam, and has her optometry appointment in March.     Patient needs a tetanus booster but doesn't want the shingles vaccine.    Review of Systems    Constitutional: No fevers. No weight change. No appetite change.  Head: No headache. No blurry vision.  Resp: No SOB. No cough.  Chest: No chest pain. No palpitations.  GI: No nausea, no vomiting, or diarrhea.  : No changes in urination.  Skin: No rash.  Extremities: No swelling.    Objective:       Vitals:    02/08/19 0917   BP: (!) 158/74   Pulse: 79   SpO2: 99%   Weight: 88.6 kg (195 lb 5.2 oz)     Physical Exam    Constitutional: Patient is not in distress.  Head: Atraumatic. PERRLA. Nose and oropharynx normal.  Resp: BS normal. Effort normal. No wheezes or rales.  Cardio: S1, S2 normal. No murmurs. Regular rate and rhythm.  Abdomen: Soft, non-tender.  Skin: No rash.  Extremities: No peripheral edema.  Foot exam: No ulcers, calluses, or wounds. Peripheral pulses intact. Sensation intact on toes, base of foot, and heel.    Assessment:       1. Essential hypertension    2. Class 2 obesity due to excess calories without serious comorbidity with body  mass index (BMI) of 35.0 to 35.9 in adult    3. Other hyperlipidemia    4. Type 2 diabetes mellitus without complication, without long-term current use of insulin        Plan:

## 2019-02-08 NOTE — PROGRESS NOTES
Subjective:       Patient ID: Shawanda Desir is a 73 y.o. female.    Chief Complaint: Follow-up    Patient reports compliance with medication.      She checks her fingerstick blood glucose at home, and it's usually around 120 - 130 in the morning. She reports improving her diet by cutting out carbs and exercising.     She checks her BP at home, and it's usually systolic BP is 130 - 145. She took her HTN medication this morning. BP in office was 158/74. Her second BP reading in office was 160/100.      She reports she had right and left flank pain in October that she attributes to the Metformin medication. Once her dose was reduced, she reports improvement in pain. Her metformin dose is at 850 mg.        She reports knee pain and gets steroid shots. She reports improvement in her knee pain. The patient also needs a handicap sticker, as he she has no able to walk secondary to the severe osteoarthritis of the knees.     She needs an eye exam, and has her optometry appointment in March.      Patient needs a tetanus booster but doesn't want the shingles vaccine.    Upon review of the last blood work, cholesterol levels were severely elevated, the patient is taking cholesterol medication as directed, denies any side effects of the medication.  Alkaline phosphate levels were elevated.  GGT levels were normal.  Patient her microalbumin was positive.     Past medical history, past social history was reviewed and discussed with the patient.    Review of Systems   Constitutional: Negative for activity change and appetite change.   HENT: Negative for congestion and dental problem.    Cardiovascular: Negative for chest pain and leg swelling.   Gastrointestinal: Negative for abdominal distention and abdominal pain.   Musculoskeletal: Positive for arthralgias (Bilateral knees). Negative for neck pain.       Objective:      Physical Exam   Constitutional: She appears well-developed and well-nourished. No distress.   HENT:   Head:  Normocephalic and atraumatic.   Right Ear: External ear normal.   Left Ear: External ear normal.   Eyes: Right eye exhibits no discharge.   Neck: Neck supple.   Cardiovascular: Normal rate, regular rhythm, normal heart sounds and intact distal pulses.   No murmur heard.  Pulses:       Dorsalis pedis pulses are 2+ on the right side, and 2+ on the left side.        Posterior tibial pulses are 2+ on the right side, and 2+ on the left side.   Pulmonary/Chest: Effort normal and breath sounds normal. No respiratory distress. She has no wheezes.   Musculoskeletal: She exhibits tenderness (Bilateral knees). She exhibits no deformity.        Right foot: There is normal range of motion and no deformity.        Left foot: There is normal range of motion and no deformity.   Feet:   Right Foot:   Protective Sensation: 8 sites tested. 8 sites sensed.   Skin Integrity: Positive for dry skin. Negative for ulcer, blister, skin breakdown, erythema, warmth or callus.   Left Foot:   Protective Sensation: 8 sites tested. 8 sites sensed.   Skin Integrity: Positive for dry skin. Negative for ulcer, blister, erythema, warmth or callus.   Neurological: No cranial nerve deficit. Coordination normal.   Skin: No rash noted. She is not diaphoretic. No erythema. No pallor.   Psychiatric: She has a normal mood and affect. Her behavior is normal. Judgment and thought content normal.   Nursing note and vitals reviewed.      Assessment:       1. Essential hypertension    2. Class 2 obesity due to excess calories without serious comorbidity with body mass index (BMI) of 35.0 to 35.9 in adult    3. Other hyperlipidemia    4. Type 2 diabetes mellitus with microalbuminuria, without long-term current use of insulin    5. Alkaline phosphatase raised    6. Chronic pain of both knees    7. Screening for colon cancer        Plan:       Essential hypertension:  Uncontrolled  -     losartan (COZAAR) 50 MG tablet; Take 1 tablet (50 mg total) by mouth once  daily.  Dispense: 90 tablet; Refill: 3    Class 2 obesity due to excess calories without serious comorbidity with body mass index (BMI) of 35.0 to 35.9 in adult:  Worsening    Other hyperlipidemia:  Uncontrolled  -     Lipid panel; Future; Expected date: 02/08/2019    Type 2 diabetes mellitus with microalbuminuria, without long-term current use of insulin:  Uncontrolled  -     Diabetic Eye Screening Photo; Future  -     MICROALBUMIN / CREATININE RATIO URINE; Future; Expected date: 02/08/2019  -     Hemoglobin A1c; Future; Expected date: 02/08/2019    Alkaline phosphatase raised:  New problem, workup needed  -     Comprehensive metabolic panel; Future; Expected date: 02/08/2019    Chronic pain of both knees:  Improved  -     Uric acid; Future; Expected date: 02/08/2019    Screening for colon cancer  -     Fecal Immunochemical Test (iFOBT); Future; Expected date: 02/08/2019    Will increase the dosage of the losartan to take 50 mg 1 tablet p.o. Q.day.  Will call the patient after we have the results of the test, healthy eating habits, avoid fried foods, red meat and processed starches, 3 meals a day, 3 snacks and small portions, decrease carbohydrate intake, drink plenty water.The patient's BMI has been recorded in the chart. The patient has been provided educational materials regarding the benefits of attaining and maintaining a normal weight. We will continue to address and follow this issue during follow up visits.  I spent 30 min in this encounter, from this time more than 50% of the time was spent in counseling and plan of care for this patient.  Patient agreed with assessment and plan. Patient verbalized understanding.

## 2019-02-08 NOTE — PROGRESS NOTES
Patient, Shawanda Desir (MRN #[id number]), presented with a recorded BMI of 35.73 kg/m^2 and a documented comorbidity(s):  - Diabetes Mellitus Type 2  - Hypertension  - Hyperlipidemia  to which the severe obesity is a contributing factor. This is consistent with the definition of severe obesity (BMI 35.0-39.9) with comorbidity (ICD-10 E66.01, Z68.35). The patient's severe obesity was monitored, evaluated, addressed and/or treated. This addendum to the medical record is made on 02/08/2019.

## 2019-02-11 DIAGNOSIS — M1A.09X0 IDIOPATHIC CHRONIC GOUT OF MULTIPLE SITES WITHOUT TOPHUS: Primary | ICD-10-CM

## 2019-02-11 RX ORDER — ALLOPURINOL 100 MG/1
100 TABLET ORAL DAILY
Qty: 30 TABLET | Refills: 2 | Status: SHIPPED | OUTPATIENT
Start: 2019-02-11 | End: 2019-07-29 | Stop reason: SDUPTHER

## 2019-02-11 RX ORDER — COLCHICINE 0.6 MG/1
0.6 TABLET ORAL DAILY
Qty: 30 TABLET | Refills: 11 | Status: SHIPPED | OUTPATIENT
Start: 2019-02-11 | End: 2019-05-08

## 2019-02-15 ENCOUNTER — LAB VISIT (OUTPATIENT)
Dept: LAB | Facility: HOSPITAL | Age: 74
End: 2019-02-15
Attending: FAMILY MEDICINE
Payer: MEDICARE

## 2019-02-15 DIAGNOSIS — Z12.11 SCREENING FOR COLON CANCER: ICD-10-CM

## 2019-02-15 PROCEDURE — 82274 ASSAY TEST FOR BLOOD FECAL: CPT

## 2019-02-16 LAB — HEMOCCULT STL QL IA: NEGATIVE

## 2019-02-20 ENCOUNTER — CLINICAL SUPPORT (OUTPATIENT)
Dept: FAMILY MEDICINE | Facility: CLINIC | Age: 74
End: 2019-02-20
Attending: FAMILY MEDICINE
Payer: MEDICARE

## 2019-02-20 DIAGNOSIS — H40.023 OPEN ANGLE WITH BORDERLINE FINDINGS AND HIGH GLAUCOMA RISK IN BOTH EYES: ICD-10-CM

## 2019-02-20 DIAGNOSIS — H34.8132 CENTRAL RETINAL VEIN OCCLUSION OF BOTH EYES, UNSPECIFIED COMPLICATION STATUS: Primary | ICD-10-CM

## 2019-02-20 DIAGNOSIS — R80.9 TYPE 2 DIABETES MELLITUS WITH MICROALBUMINURIA, WITHOUT LONG-TERM CURRENT USE OF INSULIN: ICD-10-CM

## 2019-02-20 DIAGNOSIS — E11.29 TYPE 2 DIABETES MELLITUS WITH MICROALBUMINURIA, WITHOUT LONG-TERM CURRENT USE OF INSULIN: ICD-10-CM

## 2019-02-20 PROCEDURE — 99999 PR PBB SHADOW E&M-EST. PATIENT-LVL II: CPT | Mod: PBBFAC,,,

## 2019-02-20 PROCEDURE — 92250 DIABETIC EYE SCREENING PHOTO: ICD-10-PCS | Mod: S$GLB,,, | Performed by: OPHTHALMOLOGY

## 2019-02-20 PROCEDURE — 99999 PR PBB SHADOW E&M-EST. PATIENT-LVL II: ICD-10-PCS | Mod: PBBFAC,,,

## 2019-02-20 PROCEDURE — 92250 FUNDUS PHOTOGRAPHY W/I&R: CPT | Mod: S$GLB,,, | Performed by: OPHTHALMOLOGY

## 2019-02-20 NOTE — PROGRESS NOTES
Shawanda Desir is a 73 y.o. female here for a diabetic eye screening with non-dilated fundus photos per Dr Rocha.    Patient cooperative?: Yes  Small pupils?: Yes  Last eye exam: not listed     For exam results, see Encounter Report.

## 2019-02-21 ENCOUNTER — TELEPHONE (OUTPATIENT)
Dept: FAMILY MEDICINE | Facility: CLINIC | Age: 74
End: 2019-02-21

## 2019-02-21 DIAGNOSIS — H57.9 EYE EXAM ABNORMAL: Primary | ICD-10-CM

## 2019-02-21 PROBLEM — H34.8132: Status: ACTIVE | Noted: 2019-02-21

## 2019-02-21 PROBLEM — H40.023 OPEN ANGLE WITH BORDERLINE FINDINGS AND HIGH GLAUCOMA RISK IN BOTH EYES: Status: ACTIVE | Noted: 2019-02-21

## 2019-02-21 NOTE — PROGRESS NOTES
Please notify patient that I have to refer her to see a retina specialist, for abnormal results in her eye examination, I will place orders, please schedule the patient.  The recommendation is to see a retina specialist in 1 month.  Thank you

## 2019-02-21 NOTE — TELEPHONE ENCOUNTER
""Please notify patient that I have to refer her to see a retina specialist, for abnormal results in her eye examination, I will place orders, please schedule the patient.  The recommendation is to see a retina specialist in 1 month.  Thank you" From Dr. Rocha.      Called pt and scheduled her an appt on 3/28/19. Pt verbalized understanding.  "

## 2019-03-25 ENCOUNTER — TELEPHONE (OUTPATIENT)
Dept: FAMILY MEDICINE | Facility: CLINIC | Age: 74
End: 2019-03-25

## 2019-03-25 RX ORDER — OLMESARTAN MEDOXOMIL 20 MG/1
20 TABLET ORAL DAILY
Qty: 90 TABLET | Refills: 3 | Status: SHIPPED | OUTPATIENT
Start: 2019-03-25 | End: 2019-08-27 | Stop reason: SDUPTHER

## 2019-03-25 NOTE — TELEPHONE ENCOUNTER
Pt states that the statin that she was taken for her BP is recalled and she got a letter from her pharmacy saying her batch is recalled. Please advise.

## 2019-03-25 NOTE — TELEPHONE ENCOUNTER
I will change the prescription losartan to Benicar instead, I will fax a new prescription to the pharmacy, she needs to take 1 tablet p.o. q.day.  (the statin is not be recall, is the blood pressure medication ARB).  Thank you

## 2019-03-25 NOTE — TELEPHONE ENCOUNTER
----- Message from Zoë Fisher sent at 3/25/2019 10:37 AM CDT -----  Contact: [phone]  Patient is requesting a call back from the nurse concerning recall on blood pressure medication.   Please call the patient upon request at phone number [phone].

## 2019-03-28 ENCOUNTER — OFFICE VISIT (OUTPATIENT)
Dept: OPTOMETRY | Facility: CLINIC | Age: 74
End: 2019-03-28
Payer: MEDICARE

## 2019-03-28 DIAGNOSIS — H40.023 OPEN ANGLE WITH BORDERLINE FINDINGS AND HIGH GLAUCOMA RISK IN BOTH EYES: ICD-10-CM

## 2019-03-28 DIAGNOSIS — H25.13 NUCLEAR SCLEROSIS OF BOTH EYES: ICD-10-CM

## 2019-03-28 DIAGNOSIS — H34.8132 STABLE CENTRAL RETINAL VEIN OCCLUSION OF BOTH EYES: Primary | ICD-10-CM

## 2019-03-28 PROCEDURE — 92004 COMPRE OPH EXAM NEW PT 1/>: CPT | Mod: S$GLB,,, | Performed by: OPTOMETRIST

## 2019-03-28 PROCEDURE — 92004 PR EYE EXAM, NEW PATIENT,COMPREHESV: ICD-10-PCS | Mod: S$GLB,,, | Performed by: OPTOMETRIST

## 2019-03-28 PROCEDURE — 99999 PR PBB SHADOW E&M-EST. PATIENT-LVL II: ICD-10-PCS | Mod: PBBFAC,,, | Performed by: OPTOMETRIST

## 2019-03-28 PROCEDURE — 99999 PR PBB SHADOW E&M-EST. PATIENT-LVL II: CPT | Mod: PBBFAC,,, | Performed by: OPTOMETRIST

## 2019-03-28 NOTE — PROGRESS NOTES
HPI     New pt here for routine eye exam. Pt states she has previously seen an   Ophthalmologist in [place] (unsure of who and where). Pt has been seen   for CRVO in OU and POAG OU. Pt states her va has gotten better over the   years, she states after she received injections, her va has been great.   Denies eye pain.  Denies flashes or floaters    Hemoglobin A1C       Date                     Value               Ref Range             Status                02/08/2019               6.9 (H)             4.0 - 5.6 %           Final                 11/09/2018               7.1 (H)             4.0 - 5.6 %           Final                 11/12/2011               6.8 (H)             4.0 - 6.2 %           Final            ----------      Last edited by Rafael Montalvo on 3/28/2019  8:58 AM. (History)            Assessment /Plan     For exam results, see Encounter Report.    Stable central retinal vein occlusion of both eyes    Open angle with borderline findings and high glaucoma risk in both eyes    Nuclear sclerosis of both eyes      1. No hemes or edema in macula today, prev had laser and injections OU, RTC with Ya 3-4 mos for baseline eval.  2. IOP fine for nerve, no fam history of glaucoma, RTC 6 mos with OCt, pachy and gonio.  3. Educated pt on presence of cataracts and effects on vision. No surgery at this time. Recheck in one year.

## 2019-03-28 NOTE — LETTER
March 28, 2019      Shima Rocha MD  [address] [facility] [address] [place] [address]           [facility]  [address] [facility] [address] [place] [address]  Phone: [phone]  Fax: [phone]          Patient: Shawanda Desir   MR Number: [id number]   YOB: 1945   Date of Visit: 3/28/2019       Dear Dr. Shima Rocha:    Thank you for referring Shawanda Desir to me for evaluation. Attached you will find relevant portions of my assessment and plan of care.    If you have questions, please do not hesitate to call me. I look forward to following Shawanda Desir along with you.    Sincerely,    Miki Garcia, OD    Enclosure  CC:  No Recipients    If you would like to receive this communication electronically, please contact [email or web address] or [phone] to request more information on [employer] access.    For providers and/or their staff who would like to refer a patient to [facility], please contact us through our one-stop-shop provider referral line, [facility] [place], operated [facility], at [phone].    If you feel you have received this communication in error or would no longer like to receive these types of communications, please e-mail [email or web address]

## 2019-04-24 ENCOUNTER — PATIENT OUTREACH (OUTPATIENT)
Dept: ADMINISTRATIVE | Facility: HOSPITAL | Age: 74
End: 2019-04-24

## 2019-05-08 ENCOUNTER — OFFICE VISIT (OUTPATIENT)
Dept: FAMILY MEDICINE | Facility: CLINIC | Age: 74
End: 2019-05-08
Payer: MEDICARE

## 2019-05-08 VITALS
OXYGEN SATURATION: 98 % | DIASTOLIC BLOOD PRESSURE: 72 MMHG | SYSTOLIC BLOOD PRESSURE: 150 MMHG | WEIGHT: 192.88 LBS | BODY MASS INDEX: 35.28 KG/M2 | HEART RATE: 88 BPM

## 2019-05-08 DIAGNOSIS — E66.09 CLASS 2 OBESITY DUE TO EXCESS CALORIES WITHOUT SERIOUS COMORBIDITY WITH BODY MASS INDEX (BMI) OF 35.0 TO 35.9 IN ADULT: ICD-10-CM

## 2019-05-08 DIAGNOSIS — R80.9 TYPE 2 DIABETES MELLITUS WITH MICROALBUMINURIA, WITHOUT LONG-TERM CURRENT USE OF INSULIN: ICD-10-CM

## 2019-05-08 DIAGNOSIS — M1A.09X0 IDIOPATHIC CHRONIC GOUT OF MULTIPLE SITES WITHOUT TOPHUS: ICD-10-CM

## 2019-05-08 DIAGNOSIS — E78.49 OTHER HYPERLIPIDEMIA: Primary | ICD-10-CM

## 2019-05-08 DIAGNOSIS — M17.0 PRIMARY OSTEOARTHRITIS OF BOTH KNEES: ICD-10-CM

## 2019-05-08 DIAGNOSIS — E11.29 TYPE 2 DIABETES MELLITUS WITH MICROALBUMINURIA, WITHOUT LONG-TERM CURRENT USE OF INSULIN: ICD-10-CM

## 2019-05-08 DIAGNOSIS — I10 ESSENTIAL HYPERTENSION: ICD-10-CM

## 2019-05-08 PROCEDURE — 99999 PR PBB SHADOW E&M-EST. PATIENT-LVL IV: CPT | Mod: PBBFAC,,, | Performed by: FAMILY MEDICINE

## 2019-05-08 PROCEDURE — 3044F HG A1C LEVEL LT 7.0%: CPT | Mod: CPTII,S$GLB,, | Performed by: FAMILY MEDICINE

## 2019-05-08 PROCEDURE — 3078F PR MOST RECENT DIASTOLIC BLOOD PRESSURE < 80 MM HG: ICD-10-PCS | Mod: CPTII,S$GLB,, | Performed by: FAMILY MEDICINE

## 2019-05-08 PROCEDURE — 99999 PR PBB SHADOW E&M-EST. PATIENT-LVL IV: ICD-10-PCS | Mod: PBBFAC,,, | Performed by: FAMILY MEDICINE

## 2019-05-08 PROCEDURE — 3077F PR MOST RECENT SYSTOLIC BLOOD PRESSURE >= 140 MM HG: ICD-10-PCS | Mod: CPTII,S$GLB,, | Performed by: FAMILY MEDICINE

## 2019-05-08 PROCEDURE — 3077F SYST BP >= 140 MM HG: CPT | Mod: CPTII,S$GLB,, | Performed by: FAMILY MEDICINE

## 2019-05-08 PROCEDURE — 99499 RISK ADDL DX/OHS AUDIT: ICD-10-PCS | Mod: S$GLB,,, | Performed by: FAMILY MEDICINE

## 2019-05-08 PROCEDURE — 99214 PR OFFICE/OUTPT VISIT, EST, LEVL IV, 30-39 MIN: ICD-10-PCS | Mod: S$GLB,,, | Performed by: FAMILY MEDICINE

## 2019-05-08 PROCEDURE — 99499 UNLISTED E&M SERVICE: CPT | Mod: S$GLB,,, | Performed by: FAMILY MEDICINE

## 2019-05-08 PROCEDURE — 99214 OFFICE O/P EST MOD 30 MIN: CPT | Mod: S$GLB,,, | Performed by: FAMILY MEDICINE

## 2019-05-08 PROCEDURE — 1101F PT FALLS ASSESS-DOCD LE1/YR: CPT | Mod: CPTII,S$GLB,, | Performed by: FAMILY MEDICINE

## 2019-05-08 PROCEDURE — 1101F PR PT FALLS ASSESS DOC 0-1 FALLS W/OUT INJ PAST YR: ICD-10-PCS | Mod: CPTII,S$GLB,, | Performed by: FAMILY MEDICINE

## 2019-05-08 PROCEDURE — 3044F PR MOST RECENT HEMOGLOBIN A1C LEVEL <7.0%: ICD-10-PCS | Mod: CPTII,S$GLB,, | Performed by: FAMILY MEDICINE

## 2019-05-08 PROCEDURE — 3078F DIAST BP <80 MM HG: CPT | Mod: CPTII,S$GLB,, | Performed by: FAMILY MEDICINE

## 2019-05-08 RX ORDER — ASPIRIN 81 MG/1
81 TABLET ORAL DAILY
Status: ON HOLD | COMMUNITY
End: 2022-08-29 | Stop reason: HOSPADM

## 2019-05-08 NOTE — PATIENT INSTRUCTIONS
Eating Heart-Healthy Food: Using the DASH Plan    Eating for your heart doesnt have to be hard or boring. You just need to know how to make healthier choices. The DASH eating plan has been developed to help you do just that. DASH stands for Dietary Approaches to Stop Hypertension. It is a plan that has been proven to be healthier for your heart and to lower your risk for high blood pressure. It can also help lower your risk for cancer, heart disease, osteoporosis, and diabetes.  Choosing from each food group  Choose foods from each of the food groups below each day. Try to get the recommended number of servings for each food group. The serving numbers are based on a diet of 2,000 calories a day. Talk to your doctor if youre unsure about your calorie needs. Along with getting the correct servings, the DASH plan also recommends a sodium intake less than 2,300 mg per day.        Grains  Servings: 6 to 8 a day  A serving is:  · 1 slice bread  · 1 ounce dry cereal  · Half a cup cooked rice, pasta or cereal  Best choices: Whole grains and any grains high in fiber. Vegetables  Servings: 4 to 5 a day  A serving is:  · 1 cup raw leafy vegetable  · Half a cup cut-up raw or cooked vegetable  · Half a cup vegetable juice  Best choices: Fresh or frozen vegetables prepared without added salt or fat.   Fruits  Servings: 4 to 5 a day  A serving is:  · 1 medium fruit  · One-quarter cup dried fruit  · Half a cup fresh, frozen, or canned fruit  · Half a cup of 100% fruit juices  Best choices: A variety of fresh fruits of different colors. Whole fruits are a better choice than fruit juices. Low-fat or fat-free dairy  Servings: 2 to 3 a day  A serving is:  · 1 cup milk  · 1 cup yogurt  · One and a half ounces cheese  Best choices: Skim or 1% milk, low-fat or fat-free yogurt or buttermilk, and low-fat cheeses.         Lean meats, poultry, fish  Servings: 6 or fewer a day  A serving is:  · 1 ounce cooked meats, poultry, or fish  · 1  egg  Best choices: Lean poultry and fish. Trim away visible fat. Broil, grill, roast, or boil instead of frying. Remove skin from poultry before eating. Limit how much red meat you eat.  Nuts, seeds, beans  Servings: 4 to 5 a week  A serving is:  · One-third cup nuts (one and a half ounces)  · 2 tablespoons nut butter or seeds  · Half a cup cooked dry beans or legumes  Best choices: Dry roasted nuts with no salt added, lentils, kidney beans, garbanzo beans, and whole bennett beans.   Fats and oils  Servings: 2 to 3 a day  A serving is:  · 1 teaspoon vegetable oil  · 1 teaspoon soft margarine  · 1 tablespoon mayonnaise  · 2 tablespoons salad dressing  Best choices: Nut and vegetable oils (nontropical vegetable oils), such as olive and canola oil. Sweets  Servings: 5 a week or fewer  A serving is:  · 1 tablespoon sugar, maple syrup, or honey  · 1 tablespoon jam or jelly  · 1 half-ounce jelly beans (about 15)  · 1 cup lemonade  Best choices: Dried fruit can be a satisfying sweet. Choose low-fat sweets. And watch your serving sizes!      For more on the DASH eating plan, visit:  www.nhlbi.nih.gov/health/health-topics/topics/dash   Date Last Reviewed: 6/1/2016  © 1881-0213 [employer]. [address] [place] [address], [place] [address]. All rights reserved. This information is not intended as a substitute for professional medical care. Always follow your healthcare professional's instructions.

## 2019-05-08 NOTE — PROGRESS NOTES
DATE: 5/8/2019  PATIENT: Shawanda Desir  REFERRING MD:   CHIEF COMPLAINT:   Chief Complaint   Patient presents with    Left Knee - Pain    Right Knee - Pain       HISTORY:  Shawanda Desir is a 73 y.o. female  who presents for follow up evaluation of her bilateral knee pain. She is a patient of mine diagnosed with bilateral bone on bone osteoarthritis.  She had cortisone injections in November 2018 which provided moderate relief until just last week.  She reports she has had relief of stiffness and pain and has been able to work with her garden. She would like to avoid surgery.  She is requesting bilateral cortisone injections today.  She does not care to try orthotic bracing.    PAST MEDICAL/SURGICAL HISTORY:  Past Medical History:   Diagnosis Date    Diabetes mellitus     Hypertension      Past Surgical History:   Procedure Laterality Date    BACK SURGERY      CERVICAL SPINE SURGERY         Current Medications:   Current Outpatient Medications:     allopurinol (ZYLOPRIM) 100 MG tablet, Take 1 tablet (100 mg total) by mouth once daily., Disp: 30 tablet, Rfl: 2    aspirin (ECOTRIN) 81 MG EC tablet, Take 81 mg by mouth once daily., Disp: , Rfl:     atorvastatin (LIPITOR) 10 MG tablet, Take 1 tablet (10 mg total) by mouth every evening., Disp: 90 tablet, Rfl: 3    ibuprofen (ADVIL,MOTRIN) 800 MG tablet, Take 800 mg by mouth every 6 (six) hours as needed for Pain., Disp: , Rfl:     metFORMIN (GLUCOPHAGE) 850 MG tablet, Take 850 mg by mouth once daily., Disp: , Rfl:     olmesartan (BENICAR) 20 MG tablet, Take 1 tablet (20 mg total) by mouth once daily., Disp: 90 tablet, Rfl: 3    Family History: family history was reviewed and is noncontributory  Social History:   Social History     Socioeconomic History    Marital status:      Spouse name: Not on file    Number of children: Not on file    Years of education: Not on file    Highest education level: Not on file   Occupational History    Not on  "file   Social Needs    Financial resource strain: Not on file    Food insecurity:     Worry: Not on file     Inability: Not on file    Transportation needs:     Medical: Not on file     Non-medical: Not on file   Tobacco Use    Smoking status: Never Smoker   Substance and Sexual Activity    Alcohol use: No     Frequency: Never    Drug use: No    Sexual activity: Not on file   Lifestyle    Physical activity:     Days per week: Not on file     Minutes per session: Not on file    Stress: Not on file   Relationships    Social connections:     Talks on phone: Not on file     Gets together: Not on file     Attends [employer] service: Not on file     Active member of club or organization: Not on file     Attends meetings of clubs or organizations: Not on file     Relationship status: Not on file   Other Topics Concern    Not on file   Social History Narrative    Not on file       ROS:  Constitution: Negative for chills, fever, and sweats. Negative for unexplained weight loss.  HENT: Negative for headaches and blurry vision.   Cardiovascular: Negative for chest pain, irregular heartbeat, leg swelling and palpitations.   Respiratory: Negative for cough and shortness of breath.   Gastrointestinal: Negative for abdominal pain, heartburn, nausea and vomiting.   Genitourinary: Negative for bladder incontinence and dysuria.   Musculoskeletal: Negative for systemic arthritis, joint swelling, muscle weakness and myalgias.   Neurological: Negative for numbness.   Psychiatric/Behavioral: Negative for depression.   Endocrine: Negative for polyuria.   Hematologic/Lymphatic: Negative for bleeding disorders.  Skin: Negative for poor wound healing.       PHYSICAL EXAM:  Ht 5' 2" (1.575 m)   Wt 87.1 kg (192 lb)   BMI 35.12 kg/m²   Shawanda Desir is a well developed, well nourished female in no acute distress. Physical examination of the bilateral knee evaluated the following:    Gait and Alignment  Inspection for ecchymosis, " swelling, atrophy, or deformity  Inspection for intra-articular and/or bursal effusions  Tenderness to palpation over the bony and soft tissue structures around the knee  Range of Motion and presence of extensor lag/contractures  Sensation and motor strength  Varus/valgus or anterior/posterior/rotatory instability  Flexion pinch and Aide's Tests  Patellar alignment/tracking/pain to palpation  Vascular exam to include skin temperature/color/capillary refill    Remarkable findings included:  Mild edema bilaterally, bony arthritic enlargement  nontender to palpation  ROM 0-130 degrees flexion  Sensation intact  Skin warm, dry, intact    IMAGING:   X-ray obtained Bilateral knee performed 11/08/18 personally reviewed with patient. Radiologist report as follows:   There is advanced degenerative arthrosis of the medial compartments of both knees with severe joint space narrowing and periarticular osteophyte formation.  There is also degenerative arthrosis of the lateral compartment of the right knee with joint space narrowing and periarticular osteophyte formation.  There are periarticular osteophytes of the lateral compartment of the left knee there is relatively severe degenerative arthrosis of the bilateral patellofemoral articulations with patellofemoral space narrowing and periarticular osteophyte formation.  No fracture or subluxation are identified.  There are no signs of joint effusion on either side.    ASSESSMENT:   Bilateral severe osteoarthrosis    PLAN:  The nature of the diagnosis, using models and diagrams when appropriate, was explained to the patient in detail. Treatment option discussed included non-operative measures of rest, modification of activities, application of ice, elevation of extremity, compression, over the counter pain/antiinflammatory relief, physical therapy, cortisone injection, or visco supplementation.  More aggressive treatment options include referral for arthroplasty.  All  questions answered and the patient wishes to proceed today with bilateral cortisone injections.  Bilateral knee cortisone injection performed today (see procedure documentation).  I have instructed to monitor injection site for signs and symptoms of inflammation/infection.  I have instructed to elevate and apply ice to knees this evening.  She will call for follow up if no improvement or worsening of symptoms.

## 2019-05-09 ENCOUNTER — OFFICE VISIT (OUTPATIENT)
Dept: ORTHOPEDICS | Facility: CLINIC | Age: 74
End: 2019-05-09
Payer: MEDICARE

## 2019-05-09 VITALS — BODY MASS INDEX: 35.33 KG/M2 | WEIGHT: 192 LBS | HEIGHT: 62 IN

## 2019-05-09 DIAGNOSIS — M17.0 PRIMARY OSTEOARTHRITIS OF KNEES, BILATERAL: Primary | ICD-10-CM

## 2019-05-09 PROCEDURE — 20610 LARGE JOINT ASPIRATION/INJECTION: R KNEE, L KNEE: ICD-10-PCS | Mod: 50,S$GLB,, | Performed by: NURSE PRACTITIONER

## 2019-05-09 PROCEDURE — 20610 DRAIN/INJ JOINT/BURSA W/O US: CPT | Mod: 50,S$GLB,, | Performed by: NURSE PRACTITIONER

## 2019-05-09 PROCEDURE — 99999 PR PBB SHADOW E&M-EST. PATIENT-LVL II: ICD-10-PCS | Mod: PBBFAC,,, | Performed by: NURSE PRACTITIONER

## 2019-05-09 PROCEDURE — 99213 OFFICE O/P EST LOW 20 MIN: CPT | Mod: 25,S$GLB,, | Performed by: NURSE PRACTITIONER

## 2019-05-09 PROCEDURE — 99999 PR PBB SHADOW E&M-EST. PATIENT-LVL II: CPT | Mod: PBBFAC,,, | Performed by: NURSE PRACTITIONER

## 2019-05-09 PROCEDURE — 99213 PR OFFICE/OUTPT VISIT, EST, LEVL III, 20-29 MIN: ICD-10-PCS | Mod: 25,S$GLB,, | Performed by: NURSE PRACTITIONER

## 2019-05-09 RX ORDER — TRIAMCINOLONE ACETONIDE 40 MG/ML
40 INJECTION, SUSPENSION INTRA-ARTICULAR; INTRAMUSCULAR
Status: DISCONTINUED | OUTPATIENT
Start: 2019-05-09 | End: 2019-05-09 | Stop reason: HOSPADM

## 2019-05-09 RX ADMIN — TRIAMCINOLONE ACETONIDE 40 MG: 40 INJECTION, SUSPENSION INTRA-ARTICULAR; INTRAMUSCULAR at 01:05

## 2019-05-09 NOTE — LETTER
May 9, 2019      Shima Rocha MD  [address] [facility] [address] [place] [address]           [facility]  [address] [facility] [address] [place] [address]  Phone: [phone]          Patient: Shawanda Desir   MR Number: [id number]   YOB: 1945   Date of Visit: 5/9/2019       Dear Dr. Shima Rocha:    Thank you for referring Shawanda Desir to me for evaluation. Attached you will find relevant portions of my assessment and plan of care.    If you have questions, please do not hesitate to call me. I look forward to following Shawanda Desir along with you.    Sincerely,    Tali Arciniega, APRN    Enclosure  CC:  No Recipients    If you would like to receive this communication electronically, please contact [email or web address] or [phone] to request more information on [employer] Link access.    For providers and/or their staff who would like to refer a patient to [facility], please contact us through our one-stop-shop provider referral line, [facility], at [phone].    If you feel you have received this communication in error or would no longer like to receive these types of communications, please e-mail [email or web address]

## 2019-05-09 NOTE — PROCEDURES
Large Joint Aspiration/Injection: R knee, L knee  Date/Time: 5/9/2019 1:57 PM  Performed by: KELECHI Cowart  Authorized by: KELECHI Cowart     Consent Done?:  Yes (Verbal)  Indications:  Pain  Timeout: Prior to procedure the correct patient, procedure, and site was verified      Location:  Knee  Site:  R knee and L knee  Prep: Patient was prepped and draped in usual sterile fashion    Ultrasonic Guidance for needle placement: No  Needle size:  22 G  Approach:  Anterolateral  Medications:  40 mg triamcinolone acetonide 40 mg/mL; 40 mg triamcinolone acetonide 40 mg/mL  Patient tolerance:  Patient tolerated the procedure well with no immediate complications

## 2019-05-14 NOTE — PROGRESS NOTES
Subjective:       Patient ID: Shawanda Desir is a 73 y.o. female.    Chief Complaint: Follow-up diabetes, hypertension, hyperlipidemia    The patient also has gout, the last uric acid levels were not therapeutic.    Knee Pain    There was no injury mechanism. The pain is present in the right knee and left knee. The quality of the pain is described as aching. The pain is severe. Pertinent negatives include no inability to bear weight, loss of motion, loss of sensation, muscle weakness, numbness or tingling. The symptoms are aggravated by movement, palpation and weight bearing. She has tried nothing for the symptoms. The treatment provided moderate relief.   Hypertension   This is a chronic problem. The current episode started more than 1 year ago. The problem has been gradually worsening since onset. Associated symptoms include anxiety. Pertinent negatives include no blurred vision, chest pain, headaches, malaise/fatigue, neck pain, orthopnea, palpitations, peripheral edema, PND, shortness of breath or sweats. There are no associated agents to hypertension. Risk factors for coronary artery disease include diabetes mellitus, dyslipidemia, obesity, post-menopausal state and family history. Past treatments include angiotensin blockers. The current treatment provides moderate improvement. Compliance problems include diet and exercise.  There is no history of angina, kidney disease, CVA, heart failure, PVD or retinopathy. There is no history of chronic renal disease or a hypertension causing med.   Diabetes   She presents for her follow-up diabetic visit. She has type 2 diabetes mellitus. Her disease course has been worsening. Hypoglycemia symptoms include mood changes. Pertinent negatives for hypoglycemia include no confusion, dizziness, headaches, nervousness/anxiousness or sweats. Pertinent negatives for diabetes include no blurred vision, no chest pain, no foot ulcerations, no polydipsia, no polyphagia, no visual  change and no weakness. Pertinent negatives for hypoglycemia complications include no blackouts. Symptoms are stable. Pertinent negatives for diabetic complications include no CVA, heart disease, PVD or retinopathy. Risk factors for coronary artery disease include diabetes mellitus, dyslipidemia, family history, hypertension, obesity, sedentary lifestyle, stress and post-menopausal. Current diabetic treatment includes oral agent (monotherapy). She is compliant with treatment most of the time. She has not had a previous visit with a dietitian. She rarely participates in exercise. There is no change in her home blood glucose trend. An ACE inhibitor/angiotensin II receptor blocker is being taken. Eye exam is current (The patient is very upset because she was charged from her insurance for the eye fhoto for diabetes and she has to see the specialist now).        Past medical history, past social history was reviewed discussed with the patient.    Review of Systems   Constitutional: Negative for activity change, appetite change and malaise/fatigue.   HENT: Negative for congestion and dental problem.    Eyes: Negative for blurred vision.   Respiratory: Negative for apnea, chest tightness and shortness of breath.    Cardiovascular: Negative for chest pain, palpitations, orthopnea, leg swelling and PND.   Endocrine: Negative for polydipsia and polyphagia.   Musculoskeletal: Negative for neck pain.   Neurological: Negative for dizziness, tingling, weakness, numbness and headaches.   Psychiatric/Behavioral: Negative for confusion. The patient is not nervous/anxious.        Objective:      Physical Exam   Constitutional: She appears well-developed and well-nourished. No distress.   HENT:   Head: Normocephalic and atraumatic.   Right Ear: External ear normal.   Left Ear: External ear normal.   Nose: Nose normal.   Mouth/Throat: Oropharynx is clear and moist.   Eyes: Pupils are equal, round, and reactive to light. Conjunctivae  are normal. Right eye exhibits no discharge. Left eye exhibits no discharge.   Neck: Neck supple. No thyromegaly present.   Cardiovascular: Normal rate, regular rhythm and normal heart sounds.   Pulmonary/Chest: Effort normal and breath sounds normal. No respiratory distress. She has no wheezes.   Musculoskeletal: She exhibits no tenderness or deformity.   Skin: She is not diaphoretic. No erythema. No pallor.   Psychiatric: She has a normal mood and affect. Her behavior is normal. Judgment and thought content normal.   Nursing note and vitals reviewed.      Assessment:       1. Other hyperlipidemia    2. Essential hypertension    3. Class 2 obesity due to excess calories without serious comorbidity with body mass index (BMI) of 35.0 to 35.9 in adult    4. Type 2 diabetes mellitus with microalbuminuria, without long-term current use of insulin    5. Idiopathic chronic gout of multiple sites without tophus    6. Primary osteoarthritis of both knees        Plan:       Other hyperlipidemia:  Uncontrolled  -     Comprehensive metabolic panel; Future; Expected date: 05/08/2019  -     Lipid panel; Future; Expected date: 05/08/2019    Essential hypertension:  Uncontrolled    Class 2 obesity due to excess calories without serious comorbidity with body mass index (BMI) of 35.0 to 35.9 in adult:  Worsening    Type 2 diabetes mellitus with microalbuminuria, without long-term current use of insulin:  controlled  -     Hemoglobin A1c; Future; Expected date: 05/08/2019    Idiopathic chronic gout of multiple sites without tophus:  Stable  -     Uric acid; Future; Expected date: 05/08/2019    Primary osteoarthritis of both knees:  Worsening  -     Ambulatory referral to Orthopedics    Will refer the patient to orthopedic specialist again for further assessment on osteoarthritis of the knees, will call the patient after have the results of the test.  The patient's BMI has been recorded in the chart. The patient has been provided  educational materials regarding the benefits of attaining and maintaining a normal weight. We will continue to address and follow this issue during follow up visits.Patient agreed with assessment and plan. Patient verbalized understanding.

## 2019-05-15 ENCOUNTER — LAB VISIT (OUTPATIENT)
Dept: LAB | Facility: HOSPITAL | Age: 74
End: 2019-05-15
Attending: FAMILY MEDICINE
Payer: MEDICARE

## 2019-05-15 DIAGNOSIS — E78.49 OTHER HYPERLIPIDEMIA: ICD-10-CM

## 2019-05-15 DIAGNOSIS — E11.29 TYPE 2 DIABETES MELLITUS WITH MICROALBUMINURIA, WITHOUT LONG-TERM CURRENT USE OF INSULIN: ICD-10-CM

## 2019-05-15 DIAGNOSIS — R80.9 TYPE 2 DIABETES MELLITUS WITH MICROALBUMINURIA, WITHOUT LONG-TERM CURRENT USE OF INSULIN: ICD-10-CM

## 2019-05-15 DIAGNOSIS — M1A.09X0 IDIOPATHIC CHRONIC GOUT OF MULTIPLE SITES WITHOUT TOPHUS: ICD-10-CM

## 2019-05-15 LAB
ALBUMIN SERPL BCP-MCNC: 3.5 G/DL (ref 3.5–5.2)
ALP SERPL-CCNC: 188 U/L (ref 55–135)
ALT SERPL W/O P-5'-P-CCNC: 14 U/L (ref 10–44)
ANION GAP SERPL CALC-SCNC: 9 MMOL/L (ref 8–16)
AST SERPL-CCNC: 14 U/L (ref 10–40)
BILIRUB SERPL-MCNC: 0.4 MG/DL (ref 0.1–1)
BUN SERPL-MCNC: 14 MG/DL (ref 8–23)
CALCIUM SERPL-MCNC: 10.3 MG/DL (ref 8.7–10.5)
CHLORIDE SERPL-SCNC: 102 MMOL/L (ref 95–110)
CHOLEST SERPL-MCNC: 275 MG/DL (ref 120–199)
CHOLEST/HDLC SERPL: 4.7 {RATIO} (ref 2–5)
CO2 SERPL-SCNC: 29 MMOL/L (ref 23–29)
CREAT SERPL-MCNC: 0.8 MG/DL (ref 0.5–1.4)
EST. GFR  (AFRICAN AMERICAN): >60 ML/MIN/1.73 M^2
EST. GFR  (NON AFRICAN AMERICAN): >60 ML/MIN/1.73 M^2
ESTIMATED AVG GLUCOSE: 146 MG/DL (ref 68–131)
GLUCOSE SERPL-MCNC: 118 MG/DL (ref 70–110)
HBA1C MFR BLD HPLC: 6.7 % (ref 4–5.6)
HDLC SERPL-MCNC: 58 MG/DL (ref 40–75)
HDLC SERPL: 21.1 % (ref 20–50)
LDLC SERPL CALC-MCNC: 201 MG/DL (ref 63–159)
NONHDLC SERPL-MCNC: 217 MG/DL
POTASSIUM SERPL-SCNC: 4.5 MMOL/L (ref 3.5–5.1)
PROT SERPL-MCNC: 7.7 G/DL (ref 6–8.4)
SODIUM SERPL-SCNC: 140 MMOL/L (ref 136–145)
TRIGL SERPL-MCNC: 80 MG/DL (ref 30–150)
URATE SERPL-MCNC: 5.4 MG/DL (ref 2.4–5.7)

## 2019-05-15 PROCEDURE — 84550 ASSAY OF BLOOD/URIC ACID: CPT

## 2019-05-15 PROCEDURE — 36415 COLL VENOUS BLD VENIPUNCTURE: CPT | Mod: PO

## 2019-05-15 PROCEDURE — 80053 COMPREHEN METABOLIC PANEL: CPT

## 2019-05-15 PROCEDURE — 83036 HEMOGLOBIN GLYCOSYLATED A1C: CPT

## 2019-05-15 PROCEDURE — 80061 LIPID PANEL: CPT

## 2019-05-16 DIAGNOSIS — R74.8 ALKALINE PHOSPHATASE ELEVATION: Primary | ICD-10-CM

## 2019-06-24 ENCOUNTER — TELEPHONE (OUTPATIENT)
Dept: OPHTHALMOLOGY | Facility: CLINIC | Age: 74
End: 2019-06-24

## 2019-06-24 NOTE — TELEPHONE ENCOUNTER
----- Message from Ann Melgar sent at 6/24/2019 10:35 AM CDT -----  Contact: Patient  Type: Needs Medical Advice    Who Called:  Patient  Best Call Back Number: [phone]  Additional Information: Patient is scheduled for an appointment on 7/1/19 and wants to reschedule.Please call back and advise.

## 2019-07-18 ENCOUNTER — OFFICE VISIT (OUTPATIENT)
Dept: OPHTHALMOLOGY | Facility: CLINIC | Age: 74
End: 2019-07-18
Payer: MEDICARE

## 2019-07-18 VITALS — SYSTOLIC BLOOD PRESSURE: 200 MMHG | HEART RATE: 87 BPM | DIASTOLIC BLOOD PRESSURE: 105 MMHG

## 2019-07-18 DIAGNOSIS — H34.8332 STABLE BRANCH RETINAL VEIN OCCLUSION OF BOTH EYES: Primary | ICD-10-CM

## 2019-07-18 DIAGNOSIS — H35.033 HYPERTENSIVE RETINOPATHY, BILATERAL: ICD-10-CM

## 2019-07-18 DIAGNOSIS — H25.13 NS (NUCLEAR SCLEROSIS), BILATERAL: ICD-10-CM

## 2019-07-18 PROCEDURE — 99999 PR PBB SHADOW E&M-EST. PATIENT-LVL III: CPT | Mod: PBBFAC,,, | Performed by: OPHTHALMOLOGY

## 2019-07-18 PROCEDURE — 92225 PR SPECIAL EYE EXAM, INITIAL: CPT | Mod: LT,S$GLB,, | Performed by: OPHTHALMOLOGY

## 2019-07-18 PROCEDURE — 92134 POSTERIOR SEGMENT OCT RETINA (OCULAR COHERENCE TOMOGRAPHY)-BOTH EYES: ICD-10-PCS | Mod: S$GLB,,, | Performed by: OPHTHALMOLOGY

## 2019-07-18 PROCEDURE — 92014 COMPRE OPH EXAM EST PT 1/>: CPT | Mod: S$GLB,,, | Performed by: OPHTHALMOLOGY

## 2019-07-18 PROCEDURE — 92134 CPTRZ OPH DX IMG PST SGM RTA: CPT | Mod: S$GLB,,, | Performed by: OPHTHALMOLOGY

## 2019-07-18 PROCEDURE — 99999 PR PBB SHADOW E&M-EST. PATIENT-LVL III: ICD-10-PCS | Mod: PBBFAC,,, | Performed by: OPHTHALMOLOGY

## 2019-07-18 PROCEDURE — 92225 PR SPECIAL EYE EXAM, INITIAL: ICD-10-PCS | Mod: RT,S$GLB,, | Performed by: OPHTHALMOLOGY

## 2019-07-18 PROCEDURE — 99499 UNLISTED E&M SERVICE: CPT | Mod: S$GLB,,, | Performed by: OPHTHALMOLOGY

## 2019-07-18 PROCEDURE — 99499 RISK ADDL DX/OHS AUDIT: ICD-10-PCS | Mod: S$GLB,,, | Performed by: OPHTHALMOLOGY

## 2019-07-18 PROCEDURE — 92014 PR EYE EXAM, EST PATIENT,COMPREHESV: ICD-10-PCS | Mod: S$GLB,,, | Performed by: OPHTHALMOLOGY

## 2019-07-18 NOTE — LETTER
July 18, 2019      Miki Garcia, OD  [address]  [place] [address]           [facility]  [address] [facility] [place] [address]  Phone: [phone]  Fax: [phone]          Patient: Shawanda Desir   MR Number: [id number]   YOB: 1945   Date of Visit: 7/18/2019       Dear Dr. Miki Garcia:    Thank you for referring Shawanda Desir to me for evaluation. Attached you will find relevant portions of my assessment and plan of care.    If you have questions, please do not hesitate to call me. I look forward to following Shawanda Desir along with you.    Sincerely,    MAYELIN Pandya MD    Enclosure  CC:  No Recipients    If you would like to receive this communication electronically, please contact [email or web address] or [phone] to request more information on [employer] Link access.    For providers and/or their staff who would like to refer a patient to [facility], please contact us through our one-stop-shop provider referral line, [facility], at [phone].    If you feel you have received this communication in error or would no longer like to receive these types of communications, please e-mail [email or web address]

## 2019-07-18 NOTE — PROGRESS NOTES
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 B/L BRVO with regressed NV  S/p superior PRP OU and prior injections  Been stable [id number]    2. HTN Ret OU  Needs improved BP control    3. DM  NO DR  BS/chol control    4. Early NS OU      1 year OCT

## 2019-07-25 ENCOUNTER — PATIENT OUTREACH (OUTPATIENT)
Dept: ADMINISTRATIVE | Facility: HOSPITAL | Age: 74
End: 2019-07-25

## 2019-07-29 ENCOUNTER — TELEPHONE (OUTPATIENT)
Dept: FAMILY MEDICINE | Facility: CLINIC | Age: 74
End: 2019-07-29

## 2019-07-29 DIAGNOSIS — R80.9 TYPE 2 DIABETES MELLITUS WITH MICROALBUMINURIA, WITHOUT LONG-TERM CURRENT USE OF INSULIN: ICD-10-CM

## 2019-07-29 DIAGNOSIS — R73.09 ABNORMAL GLUCOSE: Primary | ICD-10-CM

## 2019-07-29 DIAGNOSIS — E11.29 TYPE 2 DIABETES MELLITUS WITH MICROALBUMINURIA, WITHOUT LONG-TERM CURRENT USE OF INSULIN: ICD-10-CM

## 2019-07-29 DIAGNOSIS — E78.5 HYPERLIPIDEMIA, UNSPECIFIED HYPERLIPIDEMIA TYPE: ICD-10-CM

## 2019-07-29 RX ORDER — ALLOPURINOL 100 MG/1
TABLET ORAL
Qty: 30 TABLET | Refills: 2 | Status: SHIPPED | OUTPATIENT
Start: 2019-07-29 | End: 2019-10-23 | Stop reason: SDUPTHER

## 2019-07-30 NOTE — TELEPHONE ENCOUNTER
----- Message from Jes Mosher sent at 7/30/2019 10:31 AM CDT -----  Type: Needs Medical Advice    Who Called:  Self   Symptoms (please be specific):  NA   How long has patient had these symptoms:  NA   Pharmacy name and phone #:  Best Call Back Number: [phone]  Additional Information: Patient states she had an appointment for labs in June. Patient states the tech could not get blood during the lab appointment. Patient need to get orders to redo lab work.

## 2019-08-01 ENCOUNTER — LAB VISIT (OUTPATIENT)
Dept: LAB | Facility: HOSPITAL | Age: 74
End: 2019-08-01
Attending: FAMILY MEDICINE
Payer: MEDICARE

## 2019-08-01 DIAGNOSIS — R80.9 TYPE 2 DIABETES MELLITUS WITH MICROALBUMINURIA, WITHOUT LONG-TERM CURRENT USE OF INSULIN: ICD-10-CM

## 2019-08-01 DIAGNOSIS — E78.5 HYPERLIPIDEMIA, UNSPECIFIED HYPERLIPIDEMIA TYPE: ICD-10-CM

## 2019-08-01 DIAGNOSIS — E11.29 TYPE 2 DIABETES MELLITUS WITH MICROALBUMINURIA, WITHOUT LONG-TERM CURRENT USE OF INSULIN: ICD-10-CM

## 2019-08-01 DIAGNOSIS — R73.09 ABNORMAL GLUCOSE: ICD-10-CM

## 2019-08-01 LAB
ALBUMIN SERPL BCP-MCNC: 3.4 G/DL (ref 3.5–5.2)
ALP SERPL-CCNC: 167 U/L (ref 55–135)
ALT SERPL W/O P-5'-P-CCNC: 14 U/L (ref 10–44)
ANION GAP SERPL CALC-SCNC: 10 MMOL/L (ref 8–16)
AST SERPL-CCNC: 14 U/L (ref 10–40)
BILIRUB SERPL-MCNC: 0.5 MG/DL (ref 0.1–1)
BUN SERPL-MCNC: 10 MG/DL (ref 8–23)
CALCIUM SERPL-MCNC: 9.6 MG/DL (ref 8.7–10.5)
CHLORIDE SERPL-SCNC: 104 MMOL/L (ref 95–110)
CHOLEST SERPL-MCNC: 192 MG/DL (ref 120–199)
CHOLEST/HDLC SERPL: 3.8 {RATIO} (ref 2–5)
CO2 SERPL-SCNC: 26 MMOL/L (ref 23–29)
CREAT SERPL-MCNC: 0.8 MG/DL (ref 0.5–1.4)
EST. GFR  (AFRICAN AMERICAN): >60 ML/MIN/1.73 M^2
EST. GFR  (NON AFRICAN AMERICAN): >60 ML/MIN/1.73 M^2
ESTIMATED AVG GLUCOSE: 154 MG/DL (ref 68–131)
GLUCOSE SERPL-MCNC: 110 MG/DL (ref 70–110)
HBA1C MFR BLD HPLC: 7 % (ref 4–5.6)
HDLC SERPL-MCNC: 50 MG/DL (ref 40–75)
HDLC SERPL: 26 % (ref 20–50)
LDLC SERPL CALC-MCNC: 125.2 MG/DL (ref 63–159)
NONHDLC SERPL-MCNC: 142 MG/DL
POTASSIUM SERPL-SCNC: 4.2 MMOL/L (ref 3.5–5.1)
PROT SERPL-MCNC: 7.3 G/DL (ref 6–8.4)
SODIUM SERPL-SCNC: 140 MMOL/L (ref 136–145)
TRIGL SERPL-MCNC: 84 MG/DL (ref 30–150)

## 2019-08-01 PROCEDURE — 80061 LIPID PANEL: CPT

## 2019-08-01 PROCEDURE — 80053 COMPREHEN METABOLIC PANEL: CPT

## 2019-08-01 PROCEDURE — 36415 COLL VENOUS BLD VENIPUNCTURE: CPT | Mod: PO

## 2019-08-01 PROCEDURE — 83036 HEMOGLOBIN GLYCOSYLATED A1C: CPT

## 2019-08-08 ENCOUNTER — OFFICE VISIT (OUTPATIENT)
Dept: FAMILY MEDICINE | Facility: CLINIC | Age: 74
End: 2019-08-08
Payer: MEDICARE

## 2019-08-08 ENCOUNTER — PATIENT MESSAGE (OUTPATIENT)
Dept: ADMINISTRATIVE | Facility: OTHER | Age: 74
End: 2019-08-08

## 2019-08-08 VITALS
OXYGEN SATURATION: 99 % | HEIGHT: 62 IN | SYSTOLIC BLOOD PRESSURE: 190 MMHG | DIASTOLIC BLOOD PRESSURE: 98 MMHG | WEIGHT: 192.44 LBS | HEART RATE: 86 BPM | TEMPERATURE: 98 F | BODY MASS INDEX: 35.41 KG/M2

## 2019-08-08 DIAGNOSIS — L72.3 SEBACEOUS CYST: ICD-10-CM

## 2019-08-08 DIAGNOSIS — R35.0 INCREASED URINARY FREQUENCY: ICD-10-CM

## 2019-08-08 DIAGNOSIS — R80.9 TYPE 2 DIABETES MELLITUS WITH MICROALBUMINURIA, WITHOUT LONG-TERM CURRENT USE OF INSULIN: Primary | ICD-10-CM

## 2019-08-08 DIAGNOSIS — I10 ESSENTIAL HYPERTENSION: ICD-10-CM

## 2019-08-08 DIAGNOSIS — E78.49 OTHER HYPERLIPIDEMIA: ICD-10-CM

## 2019-08-08 DIAGNOSIS — E11.29 TYPE 2 DIABETES MELLITUS WITH MICROALBUMINURIA, WITHOUT LONG-TERM CURRENT USE OF INSULIN: Primary | ICD-10-CM

## 2019-08-08 DIAGNOSIS — E66.09 CLASS 2 OBESITY DUE TO EXCESS CALORIES WITHOUT SERIOUS COMORBIDITY WITH BODY MASS INDEX (BMI) OF 35.0 TO 35.9 IN ADULT: ICD-10-CM

## 2019-08-08 PROCEDURE — 3045F PR MOST RECENT HEMOGLOBIN A1C LEVEL 7.0-9.0%: CPT | Mod: CPTII,S$GLB,, | Performed by: FAMILY MEDICINE

## 2019-08-08 PROCEDURE — 3045F PR MOST RECENT HEMOGLOBIN A1C LEVEL 7.0-9.0%: ICD-10-PCS | Mod: CPTII,S$GLB,, | Performed by: FAMILY MEDICINE

## 2019-08-08 PROCEDURE — 3077F SYST BP >= 140 MM HG: CPT | Mod: CPTII,S$GLB,, | Performed by: FAMILY MEDICINE

## 2019-08-08 PROCEDURE — 1101F PR PT FALLS ASSESS DOC 0-1 FALLS W/OUT INJ PAST YR: ICD-10-PCS | Mod: CPTII,S$GLB,, | Performed by: FAMILY MEDICINE

## 2019-08-08 PROCEDURE — 99999 PR PBB SHADOW E&M-EST. PATIENT-LVL IV: CPT | Mod: PBBFAC,,, | Performed by: FAMILY MEDICINE

## 2019-08-08 PROCEDURE — 3080F PR MOST RECENT DIASTOLIC BLOOD PRESSURE >= 90 MM HG: ICD-10-PCS | Mod: CPTII,S$GLB,, | Performed by: FAMILY MEDICINE

## 2019-08-08 PROCEDURE — 99214 OFFICE O/P EST MOD 30 MIN: CPT | Mod: S$GLB,,, | Performed by: FAMILY MEDICINE

## 2019-08-08 PROCEDURE — 3080F DIAST BP >= 90 MM HG: CPT | Mod: CPTII,S$GLB,, | Performed by: FAMILY MEDICINE

## 2019-08-08 PROCEDURE — 1101F PT FALLS ASSESS-DOCD LE1/YR: CPT | Mod: CPTII,S$GLB,, | Performed by: FAMILY MEDICINE

## 2019-08-08 PROCEDURE — 99999 PR PBB SHADOW E&M-EST. PATIENT-LVL IV: ICD-10-PCS | Mod: PBBFAC,,, | Performed by: FAMILY MEDICINE

## 2019-08-08 PROCEDURE — 3077F PR MOST RECENT SYSTOLIC BLOOD PRESSURE >= 140 MM HG: ICD-10-PCS | Mod: CPTII,S$GLB,, | Performed by: FAMILY MEDICINE

## 2019-08-08 PROCEDURE — 99214 PR OFFICE/OUTPT VISIT, EST, LEVL IV, 30-39 MIN: ICD-10-PCS | Mod: S$GLB,,, | Performed by: FAMILY MEDICINE

## 2019-08-08 RX ORDER — CLONIDINE HYDROCHLORIDE 0.1 MG/1
0.1 TABLET ORAL NIGHTLY
Qty: 90 TABLET | Refills: 3 | Status: SHIPPED | OUTPATIENT
Start: 2019-08-08 | End: 2019-08-22 | Stop reason: ALTCHOICE

## 2019-08-08 RX ORDER — CLONIDINE HYDROCHLORIDE 0.1 MG/1
0.1 TABLET ORAL ONCE
Status: COMPLETED | OUTPATIENT
Start: 2019-08-08 | End: 2019-08-08

## 2019-08-08 RX ADMIN — CLONIDINE HYDROCHLORIDE 0.1 MG: 0.1 TABLET ORAL at 09:08

## 2019-08-08 NOTE — PATIENT INSTRUCTIONS
Eating Heart-Healthy Foods  Eating has a big impact on your heart health. In fact, eating healthier can improve several of your heart risks at once. For instance, it helps you manage weight, cholesterol, and blood pressure. Here are ideas to help you make heart-healthy changes without giving up all the foods and flavors you love.  Getting started  · Talk with your health care provider about eating plans, such as the DASH or Mediterranean diet. You may also be referred to a dietitian.  · Change a few things at a time. Give yourself time to get used to a few eating changes before adding more.  · Work to create a tasty, healthy eating plan that you can stick to for the rest of your life.    Goals for healthy eating  Below are some tips to improve your eating habits:  · Limit saturated fats and trans fats. Saturated fats raise your levels of cholesterol, so keep these fats to a minimum. They are found in foods such as fatty meats, whole milk, cheese, and palm and coconut oils. Avoid trans fats because they lower good cholesterol as well as raise bad cholesterol. Trans fats are most often found in processed foods.  · Reduce sodium (salt) intake. Eating too much salt may increase your blood pressure. Limit your sodium intake to 2,300 milligrams (mg) per day, or less if your health care provider recommends it. Dining out less often and eating fewer processed foods are two great ways to decrease the amount of salt you consume.  · Managing calories. A calorie is a unit of energy. Your body burns calories for fuel, but if you eat more calories than your body burns, the extras are stored as fat. Your health care provider can help you create a diet plan to manage your calories. This will likely include eating healthier foods as well as exercising regularly. To help you track your progress, keep a diary to record what you eat and how often you exercise.  Choose the right foods  Aim to make these foods staples of your diet. If  you have diabetes, you may have different recommendations than what is listed here:  · Fruits and vegetable provide plenty of nutrients without a lot of calories. At meals, fill half your plate with these foods. Split the other half of your plate between whole grains and lean protein.  · Whole grains are high in fiber and rich in vitamins and nutrients. Good choices include whole-wheat bread, pasta, and brown rice.  · Lean proteins give you nutrition with less fat. Good choices include fish, skinless chicken, and beans.  · Low-fat or nonfat dairy provides nutrients without a lot of fat. Try low-fat or nonfat milk, cheese, or yogurt.  · Healthy fats can be good for you in small amounts. These are unsaturated fats, such as olive oil, nuts, and fish. Try to have at least 2 servings per week of fatty fish such as salmon, sardines, mackerel, rainbow trout, and albacore tuna. These contain omega-3 fatty acids, which are good for your heart. Flaxseed is another source of a heart-healthy fat.  More on heart healthy eating    Read food labels  Healthy eating starts at the grocery store. Be sure to pay attention to food labels on packaged foods. Look for products that are high in fiber and protein, and low in saturated fat, cholesterol, and sodium. Avoid products that contain trans fat. And pay close attention to serving size. For instance, if you plan to eat two servings, double all the numbers on the label.  Prepare food right  A key part of healthy cooking is cutting down on added fat and salt. Look on the internet for lower-fat, lower-sodium recipes. Also, try these tips:  · Remove fat from meat and skin from poultry before cooking.  · Skim fat from the surface of soups and sauces.  · Broil, boil, bake, steam, grill, and microwave food without added fats.  · Choose ingredients that spice up your food without adding calories, fat, or sodium. Try these items: horseradish, hot sauce, lemon, mustard, nonfat salad dressings,  and vinegar. For salt-free herbs and spices, try basil, cilantro, cinnamon, pepper, and rosemary.  Date Last Reviewed: 6/25/2015  © 4833-7700 [employer]. [address] [place] [address], [place] [address]. All rights reserved. This information is not intended as a substitute for professional medical care. Always follow your healthcare professional's instructions.        Low-Salt Diet  This diet removes foods that are high in salt. It also limits the amount of salt you use when cooking. It is most often used for people with high blood pressure, edema (fluid retention), and kidney, liver, or heart disease.  Table salt contains the mineral sodium. Your body needs sodium to work normally. But too much sodium can make your health problems worse. Your healthcare provider is recommending a low-salt (also called low-sodium) diet for you. Your total daily allowance of salt is 1,500 to 2,300 milligrams (mg). It is less than 1 teaspoon of table salt. This means you can have only about 500 to 700 mg of sodium at each meal. People with certain health problems should limit salt intake to the lower end of the recommended range.    When you cook, dont add much salt. If you can cook without using salt, even better. Dont add salt to your food at the table.  When shopping, read food labels. Salt is often called sodium on the label. Choose foods that are salt-free, low salt, or very low salt. Note that foods with reduced salt may not lower your salt intake enough.    Beans, potatoes, and pasta  Ok: Dry beans, split peas, lentils, potatoes, rice, macaroni, pasta, spaghetti without added salt  Avoid: Potato chips, tortilla chips, and similar products  Breads and cereals  Ok: Low-sodium breads, rolls, cereals, and cakes; low-salt crackers, matzo crackers  Avoid: Salted crackers, pretzels, popcorn, Ukrainian toast, pancakes, muffins  Dairy  Ok: Milk, chocolate milk, hot chocolate mix, low-salt cheeses, and yogurt  Avoid: Processed  cheese and cheese spreads; Roquefort, Camembert, and cottage cheese; buttermilk, instant breakfast drink  Desserts  Ok: Ice cream, frozen yogurt, juice bars, gelatin, cookies and pies, sugar, honey, jelly, hard candy  Avoid: Most pies, cakes and cookies prepared or processed with salt; instant pudding  Drinks  Ok: Tea, coffee, fizzy (carbonated) drinks, juices  Avoid: Flavored coffees, electrolyte replacement drinks, sports drinks  Meats  Ok: All fresh meat, fish, poultry, low-salt tuna, eggs, egg substitute  Avoid: Smoked, pickled, brine-cured, or salted meats and fish. This includes salomon, chipped beef, corned beef, hot dogs, deli meats, ham, kosher meats, salt pork, sausage, canned tuna, salted codfish, smoked salmon, herring, sardines, or anchovies.  Seasonings and spices  Ok: Most seasonings are okay. Good substitutes for salt include: fresh herb blends, hot sauce, lemon, garlic, padilla, vinegar, dry mustard, parsley, cilantro, horseradish, tomato paste, regular margarine, mayonnaise, unsalted butter, cream cheese, vegetable oil, cream, low-salt salad dressing and gravy.  Avoid: Regular ketchup, relishes, pickles, soy sauce, teriyaki sauce, Worcestershire sauce, BBQ sauce, tartar sauce, meat tenderizer, chili sauce, regular gravy, regular salad dressing, salted butter  Soups  Ok: Low-salt soups and broths made with allowed foods  Avoid: Bouillon cubes, soups with smoked or salted meats, regular soup and broth  Vegetables  Ok: Most vegetables are okay; also low-salt tomato and vegetable juices  Avoid: Sauerkraut and other brine-soaked vegetables; pickles and other pickled vegetables; tomato juice, olives  Date Last Reviewed: 8/1/2016  © 3096-3249 [employer]. [address] [place] [address], [place] [address]. All rights reserved. This information is not intended as a substitute for professional medical care. Always follow your healthcare professional's instructions.

## 2019-08-09 ENCOUNTER — CLINICAL SUPPORT (OUTPATIENT)
Dept: FAMILY MEDICINE | Facility: CLINIC | Age: 74
End: 2019-08-09
Payer: MEDICARE

## 2019-08-09 ENCOUNTER — PATIENT OUTREACH (OUTPATIENT)
Dept: OTHER | Facility: OTHER | Age: 74
End: 2019-08-09

## 2019-08-09 DIAGNOSIS — I10 HYPERTENSION, UNSPECIFIED TYPE: Primary | ICD-10-CM

## 2019-08-09 PROCEDURE — 99999 PR PBB SHADOW E&M-EST. PATIENT-LVL II: CPT | Mod: PBBFAC,,,

## 2019-08-09 PROCEDURE — 99999 PR PBB SHADOW E&M-EST. PATIENT-LVL II: ICD-10-PCS | Mod: PBBFAC,,,

## 2019-08-09 NOTE — LETTER
August 14, 2019     Shawanda Desir  [address]  [place] [address]       Dear Shawanda,    Welcome to [facility]! Our goal is to make care effective, proactive and convenient by using data you send us from home to better treat your chronic conditions.          My name is Mary Ballesteros, and I am your dedicated Digital Medicine clinician. As an expert in medication management, I will help ensure that the medications you are taking continue to provide the intended benefits and help you reach your goals. You can reach me directly at [phone] or by sending me a message directly through your [facility] account.      I am Jania Moya and I will be your health . My job is to help you identify lifestyle changes to improve your disease control. We will talk about nutrition, exercise, and other ways you may be able to adjust your current habits to better your health. Additionally, we will help ensure you are completing the tests and screenings that are necessary to help manage your conditions. You can reach me directly at [phone] or by sending me a message directly through your [facility] account.    Most importantly, YOU are at the center of this team. Together, we will work to improve your overall health and encourage you to meet your goals for a healthier lifestyle.     What we expect from YOU:  · Please take frequent home blood pressure measurements. We ask that you take at least 1 blood pressure reading per week, but more information will better help us get you know you. Be sure you rest for a few minutes before taking the reading in a quiet, comfortable place.     Be available to receive phone calls or [facility] messages, when appropriate, from your care team. Please let us know if there are any specific days or times that work best for us to reach you via phone.     Complete routine tests and screenings. Dont worry, we will help keep you on track!           What you  should expect from your Digital Medicine Care Team:   We will work with you to create a personalized plan of care and provide you with encouragement and education, including regarding lifestyle changes, that could help you manage your disease states.     We will adjust your current medications, if needed, and continue to monitor your long-term progress.     We will provide you and your physician with monthly progress reports after you have been in the program for more than 30 days.     We will send you reminders through [facility] and text messages to help ensure you do not miss any testing deadlines to help manage your disease states.    You will be able to reach us by phone or through your [facility] account by clicking our names under Care Team on the right side of the home screen.    I look forward to working with you to achieve your blood pressure goals!    We look forward to working with you to help manage your health,    Sincerely,    Your Digital Medicine Team    Please visit our websites to learn more:   · Hypertension: [email or web address]      Remember, we are not available for emergencies. If you have an emergency, please contact your doctors office directly or call [facility] on-call ([phone] or [phone]) or [phone].

## 2019-08-09 NOTE — PROGRESS NOTES
1st attempt for enrollment call.  No answer, left voicemail.      Last 5 Patient Entered Readings                                      Current 30 Day Average: 164/91     Recent Readings 8/8/2019    SBP (mmHg) 164    DBP (mmHg) 91    Pulse 75

## 2019-08-09 NOTE — PATIENT INSTRUCTIONS
pt enrolled in Digital hypertension program, will cont to monitor BP daily, report s/s of elevated BP, maintain compliant diet, decreasing sodium and caffeine,  and  and increase exercise regimen.

## 2019-08-09 NOTE — PROGRESS NOTES
Shawanda Desir 73 y.o. female is here today for Blood Pressure check.   History of HTN no.    Review of patient's allergies indicates:  No Known Allergies  Creatinine   Date Value Ref Range Status   08/01/2019 0.8 0.5 - 1.4 mg/dL Final     Sodium   Date Value Ref Range Status   08/01/2019 140 136 - 145 mmol/L Final     Potassium   Date Value Ref Range Status   08/01/2019 4.2 3.5 - 5.1 mmol/L Final   ]  Patient verifies taking blood pressure medications on a regular basis at the same time of the day.     Current Outpatient Medications:     allopurinol (ZYLOPRIM) 100 MG tablet, TAKE 1 TABLET BY MOUTH EVERY DAY, Disp: 30 tablet, Rfl: 2    aspirin (ECOTRIN) 81 MG EC tablet, Take 81 mg by mouth once daily., Disp: , Rfl:     atorvastatin (LIPITOR) 10 MG tablet, Take 1 tablet (10 mg total) by mouth every evening., Disp: 90 tablet, Rfl: 3    cloNIDine (CATAPRES) 0.1 MG tablet, Take 1 tablet (0.1 mg total) by mouth every evening., Disp: 90 tablet, Rfl: 3    ibuprofen (ADVIL,MOTRIN) 800 MG tablet, Take 800 mg by mouth every 6 (six) hours as needed for Pain., Disp: , Rfl:     linaGLIPtin (TRADJENTA) 5 mg Tab tablet, Take 1 tablet (5 mg total) by mouth once daily., Disp: 90 tablet, Rfl: 3    olmesartan (BENICAR) 20 MG tablet, Take 1 tablet (20 mg total) by mouth once daily., Disp: 90 tablet, Rfl: 3  Does patient have record of home blood pressure readings no. Readings have been averaging n/a.   Last dose of blood pressure medication was taken at 10am .  Patient is asymptomatic.   Complains of n/a.      ,   .     Blood pressure reading after 15 minutes was 172/88.  Dr. Rocha notified.

## 2019-08-13 NOTE — PROGRESS NOTES
Subjective:       Patient ID: Shawanda Desir is a 73 y.o. female.    Chief Complaint: Follow-up (3mth ) and Urinary Frequency    The patient is coming here today for a follow-up visit on diabetes, the patient stated that she cannot tolerate metformin, the patient stated diabetes is causing her diarrhea and abdominal pain. She wants to change the medication for something else.  Her diabetes was well controlled a last office visit.  The patient denies any numbness or tingling sensation, denies any chest pain or shortness of breath.  She did not bring a log of her fingerstick blood glucose at this office visit.    The patient is coming here today for a follow-up hypertension, she is not been monitor her blood pressure home, she has been taking her medicines as directed.  She denies any symptoms chest pain, shortness of breath, headaches, nausea, weakness at this office visit.    The patient complains of increased frequency of urination, denies any dysuria.  Denies any abdominal pain.    The patient also noticed a lesion on the back, she would like to be removed, the patient stated that he is having discomfort and on the area.  Denies any drainage.    The patient also is coming here today for a follow-up on hyperlipidemia, she has been taking her cholesterol medicine as directed.  She denies any problems taking the medication.  Her last cholesterol levels were improved considerably from previous.    Past medical history, past social history was reviewed and discussed with the patient.    Review of Systems   Constitutional: Negative for activity change and appetite change.   HENT: Negative for congestion and ear discharge.    Eyes: Negative for discharge and itching.   Respiratory: Negative for choking and chest tightness.    Cardiovascular: Negative for chest pain and leg swelling.   Gastrointestinal: Negative for abdominal distention and abdominal pain.   Endocrine: Negative for cold intolerance and heat intolerance.    Genitourinary: Positive for frequency. Negative for dysuria and flank pain.   Musculoskeletal: Negative for arthralgias and back pain.   Skin: Negative for pallor and rash.        Lesion on back   Allergic/Immunologic: Negative for environmental allergies and food allergies.   Neurological: Negative for dizziness and facial asymmetry.   Hematological: Negative for adenopathy. Does not bruise/bleed easily.   Psychiatric/Behavioral: Negative for agitation and confusion.       Objective:      Physical Exam   Constitutional: She appears well-developed and well-nourished. No distress.   HENT:   Head: Normocephalic and atraumatic.   Right Ear: External ear normal.   Left Ear: External ear normal.   Mouth/Throat: Oropharynx is clear and moist. No oropharyngeal exudate.   Eyes: Pupils are equal, round, and reactive to light. Conjunctivae are normal. Right eye exhibits no discharge. Left eye exhibits no discharge.   Neck: Neck supple. No thyromegaly present.   Cardiovascular: Normal rate, regular rhythm and normal heart sounds.   No murmur heard.  Pulmonary/Chest: Effort normal and breath sounds normal. No respiratory distress. She has no wheezes.   Abdominal: She exhibits no distension. There is no tenderness.   Musculoskeletal: She exhibits no tenderness or deformity.   Neurological: No cranial nerve deficit. Coordination normal.   Skin: No rash noted. She is not diaphoretic. No erythema. No pallor.   Has presence of vas is cyst on the back   Psychiatric: She has a normal mood and affect. Her behavior is normal. Judgment and thought content normal.   Nursing note and vitals reviewed.      Assessment:       1. Class 2 obesity due to excess calories without serious comorbidity with body mass index (BMI) of 35.0 to 35.9 in adult    2. Type 2 diabetes mellitus with microalbuminuria, without long-term current use of insulin    3. Essential hypertension    4. Other hyperlipidemia    5. Increased urinary frequency    6.  Sebaceous cyst        Plan:     Type 2 diabetes mellitus with microalbuminuria, without long-term current use of insulin:  controlled  -     linaGLIPtin (TRADJENTA) 5 mg Tab tablet; Take 1 tablet (5 mg total) by mouth once daily.  Dispense: 90 tablet; Refill: 3    Class 2 obesity due to excess calories without serious comorbidity with body mass index (BMI) of 35.0 to 35.9 in adult:  Stable    Essential hypertension:  Uncontrolled  -     NURSING COMMUNICATION: Create [facility] Account  -     Hypertension Digital Medicine ([facility]) Enrollment Order  -     Hypertension Digital Medicine ([facility]): Assign Onboarding Questionnaires  -     cloNIDine (CATAPRES) 0.1 MG tablet; Take 1 tablet (0.1 mg total) by mouth every evening.  Dispense: 90 tablet; Refill: 3    Other hyperlipidemia:  Uncontrolled    Increased urinary frequency:  New problem, workup needed  -     Urinalysis; Future; Expected date: 08/08/2019  -     Urine culture; Future; Expected date: 08/08/2019    Sebaceous cyst:  New problem, workup needed  -     Ambulatory referral to Dermatology    Clonidine 0.1 mg was given in the clinic, the blood pressure decreased to 190/98.  The patient will come to the office tomorrow for another blood pressure check, she was advised that if she noticed any chest pain or shortness of breath or headache, she will have to go to the emergency department and she agreed to that.  Will discontinue metformin due to side effects, will start patient on Tradjenta 5 mg instead.  Continue to monitor fingerstick blood glucose once daily.  Will enroll the patient to the digital hypertension program.  Will refer the patient to the dermatologist secondary to sebaceous cyst.  The patient's BMI has been recorded in the chart. The patient has been provided educational materials regarding the benefits of attaining and maintaining a normal weight. We will continue to address and follow this issue during follow up visits.

## 2019-08-14 NOTE — PROGRESS NOTES
Last 5 Patient Entered Readings                                      Current 30 Day Average: 153/86     Recent Readings 8/14/2019 8/14/2019 8/13/2019 8/13/2019 8/12/2019    SBP (mmHg) 120 181 120 120 175    DBP (mmHg) 80 95 80 80 86    Pulse 80 77 80 80 79          Digital Medicine Enrollment Call    Introduced Mrs. Shawanda Desir to Digital Medicine.     Discussed program expectations and requirements.    Introduced digital medicine care team.     Reviewed the importance of self-monitoring for digital medicine participation.     Reviewed that the Digital Medicine team is not available for emergencies and instructed the patient to call 911 or [facility] On Call or go to your nearest Emergency Department ([phone] or [phone]) if one arises.

## 2019-08-15 ENCOUNTER — PATIENT OUTREACH (OUTPATIENT)
Dept: OTHER | Facility: OTHER | Age: 74
End: 2019-08-15

## 2019-08-15 NOTE — PROGRESS NOTES
"08/15/19 1:28 PM - called patient who reported that she is currently at [facility] due to high blood pressure. Patient was able to articulate on the phone and denies chest pain at this time. Will follow up with her on Monday August 19, 2019.  08/19/19 9:15 AM - called patient who reports that she is currently getting discharged from the hospital. She states that she feels better at this time. Will follow up later in the week to do medication reconciliation, discuss hospital admission, and complete introduction to HTN DMP call.     HPI:  Ms. Mayberry is a 73 year old female with a PMH that includes:  Past Medical History:   Diagnosis Date    Diabetes mellitus     Hypertension      Called patient to welcome into HTN DMP. Patient endorses adherence to medication regimen. Patient denies hypotensive s/sx (lightheadedness, dizziness, nausea, fatigue); patient denies hypertensive s/sx (SOB, CP, severe headaches, changes in vision). Instructed patient to seek medical care if BP > 180/110 and is accompanied by hypertensive s/sx associated, patient confirms understanding. Patient was recently in the hospital due to elevated BP. Patient reported that she took clonidine as prescribed for 5 days and had swollen legs by Wednesday, August 14. She said she took her BP the next morning and her BP was 235/110 mmHg. She went to [facility] and felt nauseated and sweating profusely with chest tightness. She states that she was cannot take clonidine or tradjenta any more. She reports that she was told that she had a small heart attack while at [facility] but did not receive any invasive treatment. She states that she was weaned of clonidine while in hospital and started on amlodipine 10 mg daily. She denies any unwanted side effects from the medication at this time. She will follow up with PCP on August 27, 2019. She reports that she is "feeling a lot better now".     Last 5 Patient Entered Readings                           "            Current 30 Day Average: 160/90     Recent Readings 8/15/2019 8/15/2019 8/15/2019 8/15/2019 8/14/2019    SBP (mmHg) 205 209 191 187 120    DBP (mmHg) 96 101 102 96 80    Pulse 83 85 82 78 80        Assessment:  Reviewed recent readings. Per 2017 ACC/ AHA HTN guidelines (goal of BP < 130/80), current 30-day average need to be addressed more throroughly today (148/87 mmHg).     Plan:  Continue current medication regimen.   Patient is to continue amlodipine 10 mg and olmesartan 20 mg daily. Educated patient on side effects of medications. Patient verbalizes understanding.  Encouraged patient to charge BP cuff since most recent readings have been 120/80 with HR 80 consistently.   I will continue to monitor regularly and will follow-up in 2 to 3 weeks, sooner if blood pressure begins to trend upward or downward.     Current medication regimen:  Hypertension Medications             Amlodipine (NORVASC) 10 MG tablet Take 1 tablet (10 mg total) by mouth every evening.    olmesartan (BENICAR) 20 MG tablet Take 1 tablet (20 mg total) by mouth once daily.          Patient denies having questions or concerns. Patient has my contact information and knows to call with any concerns or clinical changes.

## 2019-08-15 NOTE — PROGRESS NOTES
Last 5 Patient Entered Readings                                      Current 30 Day Average: 157/87     Recent Readings 8/15/2019 8/15/2019 8/14/2019 8/14/2019 8/13/2019    SBP (mmHg) 191 187 120 181 120    DBP (mmHg) 102 96 80 95 80    Pulse 82 78 80 77 80      Digital Medicine: Health  Introduction Call   Patient was busy and requested call back in order to complete Digital Medicine health  introduction call.

## 2019-08-16 ENCOUNTER — TELEPHONE (OUTPATIENT)
Dept: FAMILY MEDICINE | Facility: CLINIC | Age: 74
End: 2019-08-16

## 2019-08-16 NOTE — TELEPHONE ENCOUNTER
Okay.    Please advise the patient to  follow-up with us as soon as she is discharge from the hospital.  Thank you

## 2019-08-16 NOTE — TELEPHONE ENCOUNTER
----- Message from Mason Villa sent at 8/16/2019  8:28 AM CDT -----  Contact: pt  Type: Needs Medical Advice    Who Called:  Pt    Best Call Back Number: [phone]  Additional Information: pt was admitted to [facility] for high BP yesterday.  Pt is at [facility] in room 202. Pt states the Rx is what is causing the BP issues

## 2019-08-20 ENCOUNTER — TELEPHONE (OUTPATIENT)
Dept: FAMILY MEDICINE | Facility: CLINIC | Age: 74
End: 2019-08-20

## 2019-08-20 NOTE — TELEPHONE ENCOUNTER
----- Message from Herlinda Barkley sent at 8/20/2019 11:36 AM CDT -----  Contact: Veronica from [employer]  Type:  Patient Returning Call    Who Called: Veronica from [employer]  Who Left Message for Patient:  Unkown  Does the patient know what this is regarding?:  Schedule an appt for f/u   Best Call Back Number: Call Patient   Additional Information:  Call to pod, no answer.

## 2019-08-20 NOTE — TELEPHONE ENCOUNTER
----- Message from Justin Amaro sent at 8/20/2019 10:48 AM CDT -----  Contact: Mirian Mota [facility]  [phone] Ex [id number]  Type:  Sooner Apoointment Request    Caller is requesting a sooner appointment.  Caller declined first available appointment listed below.  Caller will not accept being placed on the waitlist and is requesting a message be sent to doctor.    Name of Caller:  Mirian Mota [facility]  [phone] Ex [id number]    When is the first available appointment?  09-09-19    Symptoms:  Hospital F/U per Dr. Rocha as soon as discharged on 08-19-19    Best Call Back Number:  [phone]    Additional Information:  Advised needs an appmnt as soon as possible per Dr. Rocha after discharge. Discharged on 08-19-19.  Please call to schedule.

## 2019-08-20 NOTE — TELEPHONE ENCOUNTER
Contacted patient and informed that Dr. Rocha's first avail appt wasn't until 9/9. Patient asked to schedule that appt.

## 2019-08-22 RX ORDER — AMLODIPINE BESYLATE 10 MG/1
10 TABLET ORAL DAILY
COMMUNITY
End: 2019-09-16 | Stop reason: SDUPTHER

## 2019-08-27 ENCOUNTER — OFFICE VISIT (OUTPATIENT)
Dept: FAMILY MEDICINE | Facility: CLINIC | Age: 74
End: 2019-08-27
Payer: MEDICARE

## 2019-08-27 VITALS
BODY MASS INDEX: 35.25 KG/M2 | HEIGHT: 62 IN | SYSTOLIC BLOOD PRESSURE: 160 MMHG | DIASTOLIC BLOOD PRESSURE: 90 MMHG | HEART RATE: 90 BPM | WEIGHT: 191.56 LBS

## 2019-08-27 DIAGNOSIS — E11.29 TYPE 2 DIABETES MELLITUS WITH MICROALBUMINURIA, WITHOUT LONG-TERM CURRENT USE OF INSULIN: ICD-10-CM

## 2019-08-27 DIAGNOSIS — R80.9 TYPE 2 DIABETES MELLITUS WITH MICROALBUMINURIA, WITHOUT LONG-TERM CURRENT USE OF INSULIN: ICD-10-CM

## 2019-08-27 DIAGNOSIS — I10 ESSENTIAL HYPERTENSION: Primary | ICD-10-CM

## 2019-08-27 PROCEDURE — 3077F SYST BP >= 140 MM HG: CPT | Mod: CPTII,S$GLB,, | Performed by: INTERNAL MEDICINE

## 2019-08-27 PROCEDURE — 99499 RISK ADDL DX/OHS AUDIT: ICD-10-PCS | Mod: S$GLB,,, | Performed by: INTERNAL MEDICINE

## 2019-08-27 PROCEDURE — 99999 PR PBB SHADOW E&M-EST. PATIENT-LVL III: CPT | Mod: PBBFAC,,, | Performed by: INTERNAL MEDICINE

## 2019-08-27 PROCEDURE — 1101F PT FALLS ASSESS-DOCD LE1/YR: CPT | Mod: CPTII,S$GLB,, | Performed by: INTERNAL MEDICINE

## 2019-08-27 PROCEDURE — 3045F PR MOST RECENT HEMOGLOBIN A1C LEVEL 7.0-9.0%: CPT | Mod: CPTII,S$GLB,, | Performed by: INTERNAL MEDICINE

## 2019-08-27 PROCEDURE — 3077F PR MOST RECENT SYSTOLIC BLOOD PRESSURE >= 140 MM HG: ICD-10-PCS | Mod: CPTII,S$GLB,, | Performed by: INTERNAL MEDICINE

## 2019-08-27 PROCEDURE — 3080F DIAST BP >= 90 MM HG: CPT | Mod: CPTII,S$GLB,, | Performed by: INTERNAL MEDICINE

## 2019-08-27 PROCEDURE — 99213 OFFICE O/P EST LOW 20 MIN: CPT | Mod: S$GLB,,, | Performed by: INTERNAL MEDICINE

## 2019-08-27 PROCEDURE — 99213 PR OFFICE/OUTPT VISIT, EST, LEVL III, 20-29 MIN: ICD-10-PCS | Mod: S$GLB,,, | Performed by: INTERNAL MEDICINE

## 2019-08-27 PROCEDURE — 3080F PR MOST RECENT DIASTOLIC BLOOD PRESSURE >= 90 MM HG: ICD-10-PCS | Mod: CPTII,S$GLB,, | Performed by: INTERNAL MEDICINE

## 2019-08-27 PROCEDURE — 99999 PR PBB SHADOW E&M-EST. PATIENT-LVL III: ICD-10-PCS | Mod: PBBFAC,,, | Performed by: INTERNAL MEDICINE

## 2019-08-27 PROCEDURE — 3045F PR MOST RECENT HEMOGLOBIN A1C LEVEL 7.0-9.0%: ICD-10-PCS | Mod: CPTII,S$GLB,, | Performed by: INTERNAL MEDICINE

## 2019-08-27 PROCEDURE — 1101F PR PT FALLS ASSESS DOC 0-1 FALLS W/OUT INJ PAST YR: ICD-10-PCS | Mod: CPTII,S$GLB,, | Performed by: INTERNAL MEDICINE

## 2019-08-27 PROCEDURE — 99499 UNLISTED E&M SERVICE: CPT | Mod: S$GLB,,, | Performed by: INTERNAL MEDICINE

## 2019-08-27 RX ORDER — OLMESARTAN MEDOXOMIL 40 MG/1
40 TABLET ORAL DAILY
Qty: 90 TABLET | Refills: 3 | Status: SHIPPED | OUTPATIENT
Start: 2019-08-27 | End: 2020-05-20

## 2019-08-27 NOTE — PROGRESS NOTES
"Subjective:       Patient ID: Shawanda Desir is a 73 y.o. female.    Chief Complaint: Hospital Follow Up (went to [facility] for HTN and had a light heart attack while in hospital. Wants to change Doctor. )    Pt here for hospital follow up. She was started on tradjenta 5 mg and cloniidine .1 in the evening a few weeks ago. She says for the next 5 days she had leg and foot swelling.  She also had some chest tightness. She says last week her pressure was 225/110. She was enrolled in dig htn program.  She then was instructed to go to [place]. When she got there she had a "mild heart attack."  She was discharged on home meds (benicar, lipitor. Asa, allopurinol) but they added 10 mg amlodipine. She has been checking bp at home.   Pt has been exercising this last week and denies any chest pain    diabetes- cannot tolerate metformin.  Currently not on med.  bs at home 100-140.     Review of Systems   Constitutional: Negative for fatigue, fever and unexpected weight change.   HENT: Negative for congestion, postnasal drip, sinus pain and sore throat.    Eyes: Negative for visual disturbance.   Respiratory: Negative for cough, chest tightness, shortness of breath and wheezing.    Cardiovascular: Negative for chest pain, palpitations and leg swelling.   Gastrointestinal: Negative for abdominal pain, blood in stool, constipation, diarrhea, nausea and vomiting.   Endocrine: Negative for cold intolerance and heat intolerance.   Genitourinary: Negative for difficulty urinating, dysuria and frequency.   Musculoskeletal: Negative for back pain, joint swelling and myalgias.   Skin: Negative for rash.   Allergic/Immunologic: Negative for environmental allergies.   Neurological: Negative for dizziness, seizures, numbness and headaches.   Hematological: Does not bruise/bleed easily.   Psychiatric/Behavioral: Negative for agitation and sleep disturbance.       Objective:      Physical Exam   Constitutional: She is oriented to " person, place, and time. She appears well-developed and well-nourished.   HENT:   Head: Normocephalic and atraumatic.   Mouth/Throat: Oropharynx is clear and moist.   Eyes: Pupils are equal, round, and reactive to light. Conjunctivae and EOM are normal.   Neck: Normal range of motion. Neck supple. No thyromegaly present.   Cardiovascular: Normal rate, regular rhythm, normal heart sounds and intact distal pulses. Exam reveals no gallop and no friction rub.   No murmur heard.  Pulmonary/Chest: Effort normal and breath sounds normal. No respiratory distress. She has no wheezes. She has no rales.   Abdominal: Soft. Bowel sounds are normal. She exhibits no distension. There is no tenderness.   Musculoskeletal: Normal range of motion. She exhibits no edema.   Lymphadenopathy:     She has no cervical adenopathy.   Neurological: She is alert and oriented to person, place, and time. No cranial nerve deficit.   Skin: Skin is warm and dry.   Psychiatric: She has a normal mood and affect. Her behavior is normal.       Assessment:       1. Essential hypertension    2. Type 2 diabetes mellitus with microalbuminuria, without long-term current use of insulin        Plan:       htn- continue norvasc. Increase benicar to 40  Dm- not on med. Continue monitoring sugar. Check a1c 3 mo. If above 7 will need to start medication (intolerant to metformin and tradjenta).

## 2019-08-30 NOTE — PROGRESS NOTES
Last 5 Patient Entered Readings                                      Current 30 Day Average: 150/85     Recent Readings 8/29/2019 8/29/2019 8/29/2019 8/28/2019 8/28/2019    SBP (mmHg) 169 175 177 120 164    DBP (mmHg) 82 76 79 80 87    Pulse 83 87 88 80 95      Digital Medicine: Health  Introduction Call   Left voicemail and requested call back in order to complete Digital Medicine health  introduction call.

## 2019-09-04 ENCOUNTER — INITIAL CONSULT (OUTPATIENT)
Dept: DERMATOLOGY | Facility: CLINIC | Age: 74
End: 2019-09-04
Payer: MEDICARE

## 2019-09-04 VITALS — WEIGHT: 191 LBS | HEIGHT: 62 IN | BODY MASS INDEX: 35.15 KG/M2

## 2019-09-04 DIAGNOSIS — L72.0 EIC (EPIDERMAL INCLUSION CYST): Primary | ICD-10-CM

## 2019-09-04 PROCEDURE — 1101F PR PT FALLS ASSESS DOC 0-1 FALLS W/OUT INJ PAST YR: ICD-10-PCS | Mod: CPTII,S$GLB,, | Performed by: DERMATOLOGY

## 2019-09-04 PROCEDURE — 99201 PR OFFICE/OUTPT VISIT,NEW,LEVL I: CPT | Mod: S$GLB,,, | Performed by: DERMATOLOGY

## 2019-09-04 PROCEDURE — 1101F PT FALLS ASSESS-DOCD LE1/YR: CPT | Mod: CPTII,S$GLB,, | Performed by: DERMATOLOGY

## 2019-09-04 PROCEDURE — 99201 PR OFFICE/OUTPT VISIT,NEW,LEVL I: ICD-10-PCS | Mod: S$GLB,,, | Performed by: DERMATOLOGY

## 2019-09-04 PROCEDURE — 99999 PR PBB SHADOW E&M-EST. PATIENT-LVL III: ICD-10-PCS | Mod: PBBFAC,,, | Performed by: DERMATOLOGY

## 2019-09-04 PROCEDURE — 99999 PR PBB SHADOW E&M-EST. PATIENT-LVL III: CPT | Mod: PBBFAC,,, | Performed by: DERMATOLOGY

## 2019-09-04 NOTE — LETTER
September 4, 2019      Shima Rocha MD  [address] [facility] [address] [place] [address]           [facility]  [address] [facility] [address] [place] [address]  Phone: [phone]  Fax: [phone]          Patient: Shawanda Desir   MR Number: [id number]   YOB: 1945   Date of Visit: 9/4/2019       Dear Dr. Shima Rocha:    Thank you for referring Shawanda Desir to me for evaluation. Attached you will find relevant portions of my assessment and plan of care.    If you have questions, please do not hesitate to call me. I look forward to following Shawanda Desir along with you.    Sincerely,    Gracie Duarte MD    Enclosure  CC:  No Recipients    If you would like to receive this communication electronically, please contact [email or web address] or [phone] to request more information on [facility] access.    For providers and/or their staff who would like to refer a patient to [facility], please contact us through our one-stop-shop provider referral line, [facility] [place], at [phone].    If you feel you have received this communication in error or would no longer like to receive these types of communications, please e-mail [email or web address]

## 2019-09-04 NOTE — PROGRESS NOTES
Subjective:       Patient ID:  Shawanda Desir is a 73 y.o. female who presents for   Chief Complaint   Patient presents with    Lesion     upper back     72 y/o female present for initial visit for lesion on upper back x 30 years, recently changing in size. The patient denies any change, including change in color or spontaneous bleeding, associated with this lesion. Denies drainage  No tx.       Denies history of NMSC  Denies family history of melanoma    Past Medical History:  No date: Diabetes mellitus  No date: Hypertension        Review of Systems   Constitutional: Negative for fever, chills and fatigue.   Skin: Positive for daily sunscreen use, activity-related sunscreen use and wears hat.   Hematologic/Lymphatic: Does not bruise/bleed easily.        Objective:    Physical Exam   Constitutional: She appears well-developed and well-nourished. No distress.   Neurological: She is alert and oriented to person, place, and time. She is not disoriented.   Psychiatric: She has a normal mood and affect.   Skin:   Areas Examined (abnormalities noted in diagram):   Back Inspection Performed              Diagram Legend     Erythematous scaling macule/papule c/w actinic keratosis       Vascular papule c/w angioma      Pigmented verrucoid papule/plaque c/w seborrheic keratosis      Yellow umbilicated papule c/w sebaceous hyperplasia      Irregularly shaped tan macule c/w lentigo     1-2 mm smooth white papules consistent with Milia      Movable subcutaneous cyst with punctum c/w epidermal inclusion cyst      Subcutaneous movable cyst c/w pilar cyst      Firm pink to brown papule c/w dermatofibroma      Pedunculated fleshy papule(s) c/w skin tag(s)      Evenly pigmented macule c/w junctional nevus     Mildly variegated pigmented, slightly irregular-bordered macule c/w mildly atypical nevus      Flesh colored to evenly pigmented papule c/w intradermal nevus       Pink pearly papule/plaque c/w basal cell carcinoma       Erythematous hyperkeratotic cursted plaque c/w SCC      Surgical scar with no sign of skin cancer recurrence      Open and closed comedones      Inflammatory papules and pustules      Verrucoid papule consistent consistent with wart     Erythematous eczematous patches and plaques     Dystrophic onycholytic nail with subungual debris c/w onychomycosis     Umbilicated papule    Erythematous-base heme-crusted tan verrucoid plaque consistent with inflamed seborrheic keratosis     Erythematous Silvery Scaling Plaque c/w Psoriasis     See annotation      Assessment / Plan:        EIC (epidermal inclusion cyst) on mid back  - The benign nature of these lesions was discussed with the patient and that no treatment is indicated today.   - Discussed that when not inflamed, definite treatment includes surgical excision. She will schedule her procedure             Follow up for at pts convenience.

## 2019-09-05 ENCOUNTER — PATIENT OUTREACH (OUTPATIENT)
Dept: OTHER | Facility: OTHER | Age: 74
End: 2019-09-05

## 2019-09-05 NOTE — PROGRESS NOTES
"09/05/19 9:03 AM - Called patient and left voicemail with call back number. Will follow up with patient at next encounter.     HPI:  Called patient to follow up. Patient endorses adherence to medication regimen. Patient denies hypotensive s/sx (lightheadedness, dizziness, nausea, fatigue); patient denies hypertensive s/sx (SOB, CP, severe headaches, changes in vision). Instructed patient to seek medical care if BP > 180/110 and is accompanied by hypertensive s/sx associated, patient confirms understanding. Patient reports that she is "feeling much better since being off that medicine" in reference to clonidine. She denies any leg swelling or chest tightness that she experienced in the hospital. She has a pill organizer that helps her keep track of her medications and is aware of increase in dose of Benicar to 40 mg daily. She states that she checks her BP at her dinner table. She reports that she checks her blood pressure "sometimes before taking medication and sometimes afterward". She states that she has charged her BP cuff since our last encounter.    Last 5 Patient Entered Readings                                      Current 30 Day Average: 151/85     Recent Readings 9/4/2019 8/29/2019 8/29/2019 8/29/2019 8/28/2019    SBP (mmHg) 164 169 175 177 120    DBP (mmHg) 80 82 76 79 80    Pulse 90 83 87 88 80        Assessment:  Reviewed recent readings. Per 2017 ACC/ AHA HTN guidelines (goal of BP < 130/80), current 30-day average need to be addressed more throroughly today.     Plan:  Continue current medication regimen. Since Benicar dose was increased to 40 mg ~1 week ago, will not make any medication adjustments at this time. In the future, may consider adding Spironolactone 25 mg.   Encouraged patient to check BP at least 1 hour after taking medications using proper BP measurement techniques. She verbalizes understanding.   I will continue to monitor regularly and will follow-up in 2 to 3 weeks, sooner if blood " pressure begins to trend upward or downward.     Current medication regimen:  Hypertension Medications             amLODIPine (NORVASC) 10 MG tablet Take 10 mg by mouth once daily.    olmesartan (BENICAR) 40 MG tablet Take 1 tablet (40 mg total) by mouth once daily.        Patient denies having questions or concerns. Patient has my contact information and knows to call with any concerns or clinical changes.

## 2019-09-06 NOTE — PROGRESS NOTES
Last 5 Patient Entered Readings                                      Current 30 Day Average: 151/85     Recent Readings 9/4/2019 8/29/2019 8/29/2019 8/29/2019 8/28/2019    SBP (mmHg) 164 169 175 177 120    DBP (mmHg) 80 82 76 79 80    Pulse 90 83 87 88 80      Digital Medicine: Health  Introduction Call   Left voicemail and requested call back in order to complete Digital Medicine health  introduction call.   3rd failed attempt. Will send [employer] message.

## 2019-09-10 ENCOUNTER — TELEPHONE (OUTPATIENT)
Dept: DERMATOLOGY | Facility: CLINIC | Age: 74
End: 2019-09-10

## 2019-09-10 ENCOUNTER — OFFICE VISIT (OUTPATIENT)
Dept: FAMILY MEDICINE | Facility: CLINIC | Age: 74
End: 2019-09-10
Payer: MEDICARE

## 2019-09-10 VITALS
DIASTOLIC BLOOD PRESSURE: 74 MMHG | OXYGEN SATURATION: 99 % | BODY MASS INDEX: 35.33 KG/M2 | HEIGHT: 62 IN | WEIGHT: 192 LBS | HEART RATE: 86 BPM | SYSTOLIC BLOOD PRESSURE: 126 MMHG | TEMPERATURE: 98 F

## 2019-09-10 DIAGNOSIS — E11.9 TYPE 2 DIABETES MELLITUS WITHOUT COMPLICATION, WITHOUT LONG-TERM CURRENT USE OF INSULIN: ICD-10-CM

## 2019-09-10 DIAGNOSIS — I10 ESSENTIAL HYPERTENSION: Primary | ICD-10-CM

## 2019-09-10 PROCEDURE — 3074F SYST BP LT 130 MM HG: CPT | Mod: CPTII,S$GLB,, | Performed by: INTERNAL MEDICINE

## 2019-09-10 PROCEDURE — 1101F PR PT FALLS ASSESS DOC 0-1 FALLS W/OUT INJ PAST YR: ICD-10-PCS | Mod: CPTII,S$GLB,, | Performed by: INTERNAL MEDICINE

## 2019-09-10 PROCEDURE — 3078F DIAST BP <80 MM HG: CPT | Mod: CPTII,S$GLB,, | Performed by: INTERNAL MEDICINE

## 2019-09-10 PROCEDURE — 99213 PR OFFICE/OUTPT VISIT, EST, LEVL III, 20-29 MIN: ICD-10-PCS | Mod: S$GLB,,, | Performed by: INTERNAL MEDICINE

## 2019-09-10 PROCEDURE — 99499 UNLISTED E&M SERVICE: CPT | Mod: S$GLB,,, | Performed by: INTERNAL MEDICINE

## 2019-09-10 PROCEDURE — 3078F PR MOST RECENT DIASTOLIC BLOOD PRESSURE < 80 MM HG: ICD-10-PCS | Mod: CPTII,S$GLB,, | Performed by: INTERNAL MEDICINE

## 2019-09-10 PROCEDURE — 1101F PT FALLS ASSESS-DOCD LE1/YR: CPT | Mod: CPTII,S$GLB,, | Performed by: INTERNAL MEDICINE

## 2019-09-10 PROCEDURE — 3045F PR MOST RECENT HEMOGLOBIN A1C LEVEL 7.0-9.0%: CPT | Mod: CPTII,S$GLB,, | Performed by: INTERNAL MEDICINE

## 2019-09-10 PROCEDURE — 3074F PR MOST RECENT SYSTOLIC BLOOD PRESSURE < 130 MM HG: ICD-10-PCS | Mod: CPTII,S$GLB,, | Performed by: INTERNAL MEDICINE

## 2019-09-10 PROCEDURE — 99213 OFFICE O/P EST LOW 20 MIN: CPT | Mod: S$GLB,,, | Performed by: INTERNAL MEDICINE

## 2019-09-10 PROCEDURE — 99999 PR PBB SHADOW E&M-EST. PATIENT-LVL III: CPT | Mod: PBBFAC,,, | Performed by: INTERNAL MEDICINE

## 2019-09-10 PROCEDURE — 99999 PR PBB SHADOW E&M-EST. PATIENT-LVL III: ICD-10-PCS | Mod: PBBFAC,,, | Performed by: INTERNAL MEDICINE

## 2019-09-10 PROCEDURE — 99499 RISK ADDL DX/OHS AUDIT: ICD-10-PCS | Mod: S$GLB,,, | Performed by: INTERNAL MEDICINE

## 2019-09-10 PROCEDURE — 3045F PR MOST RECENT HEMOGLOBIN A1C LEVEL 7.0-9.0%: ICD-10-PCS | Mod: CPTII,S$GLB,, | Performed by: INTERNAL MEDICINE

## 2019-09-10 NOTE — PROGRESS NOTES
Subjective:       Patient ID: Shawanda Desir is a 73 y.o. female.    Chief Complaint: Follow-up (2 week)    Pt here for follow up HTN. She is enrolled in dig htn clinic. She is currently on norvasc and benicar. She checked bp at home yesterday and bp was 142/72.  She has been checking her blood sugars first thing in the morning and they have been 120-130s off of medication. Due for a1c in 2 mo    Review of Systems   Constitutional: Negative for fatigue, fever and unexpected weight change.   HENT: Negative for congestion, postnasal drip, sinus pain and sore throat.    Eyes: Negative for visual disturbance.   Respiratory: Negative for cough, chest tightness, shortness of breath and wheezing.    Cardiovascular: Negative for chest pain, palpitations and leg swelling.   Gastrointestinal: Negative for abdominal pain, blood in stool, constipation, diarrhea, nausea and vomiting.   Endocrine: Negative for cold intolerance and heat intolerance.   Genitourinary: Negative for difficulty urinating, dysuria and frequency.   Musculoskeletal: Negative for back pain, joint swelling and myalgias.   Skin: Negative for rash.   Allergic/Immunologic: Negative for environmental allergies.   Neurological: Negative for dizziness, seizures, numbness and headaches.   Hematological: Does not bruise/bleed easily.   Psychiatric/Behavioral: Negative for agitation and sleep disturbance.       Objective:      Physical Exam   Constitutional: She is oriented to person, place, and time. She appears well-developed and well-nourished.   HENT:   Head: Normocephalic and atraumatic.   Mouth/Throat: Oropharynx is clear and moist.   Eyes: Pupils are equal, round, and reactive to light. Conjunctivae and EOM are normal.   Neck: Normal range of motion. Neck supple. No thyromegaly present.   Cardiovascular: Normal rate, regular rhythm, normal heart sounds and intact distal pulses. Exam reveals no gallop and no friction rub.   No murmur heard.  Pulmonary/Chest:  Effort normal and breath sounds normal. No respiratory distress. She has no wheezes. She has no rales.   Abdominal: Soft. Bowel sounds are normal. She exhibits no distension. There is no tenderness.   Musculoskeletal: Normal range of motion. She exhibits no edema.   Lymphadenopathy:     She has no cervical adenopathy.   Neurological: She is alert and oriented to person, place, and time. No cranial nerve deficit.   Skin: Skin is warm and dry.   Psychiatric: She has a normal mood and affect. Her behavior is normal.       Assessment:       1. Essential hypertension    2. Type 2 diabetes mellitus without complication, without long-term current use of insulin        Plan:       htn- stable on medication. Continue  DM 2: off med. Sugars ok. Check a1c 8 weeks

## 2019-09-10 NOTE — TELEPHONE ENCOUNTER
----- Message from Dolores Wu sent at 9/10/2019 11:09 AM CDT -----  Good morning,    Patient would like to schedule her procedure please give her a call

## 2019-09-17 RX ORDER — AMLODIPINE BESYLATE 10 MG/1
TABLET ORAL
Qty: 30 TABLET | Refills: 0 | Status: SHIPPED | OUTPATIENT
Start: 2019-09-17 | End: 2019-10-15 | Stop reason: SDUPTHER

## 2019-09-18 ENCOUNTER — PROCEDURE VISIT (OUTPATIENT)
Dept: DERMATOLOGY | Facility: CLINIC | Age: 74
End: 2019-09-18
Payer: MEDICARE

## 2019-09-18 VITALS — BODY MASS INDEX: 35.33 KG/M2 | RESPIRATION RATE: 18 BRPM | WEIGHT: 192 LBS | HEIGHT: 62 IN

## 2019-09-18 DIAGNOSIS — L72.0 EIC (EPIDERMAL INCLUSION CYST): Primary | ICD-10-CM

## 2019-09-18 PROCEDURE — 99499 UNLISTED E&M SERVICE: CPT | Mod: S$GLB,,, | Performed by: DERMATOLOGY

## 2019-09-18 PROCEDURE — 88304 TISSUE SPECIMEN TO PATHOLOGY, DERMATOLOGY: ICD-10-PCS | Mod: 26,,, | Performed by: PATHOLOGY

## 2019-09-18 PROCEDURE — 12032 INTMD RPR S/A/T/EXT 2.6-7.5: CPT | Mod: S$GLB,,, | Performed by: DERMATOLOGY

## 2019-09-18 PROCEDURE — 11402 PR EXC SKIN BENIG 1.1-2 CM TRUNK,ARM,LEG: ICD-10-PCS | Mod: 59,S$GLB,, | Performed by: DERMATOLOGY

## 2019-09-18 PROCEDURE — 12032 PR LAYR CLOS WND TRUNK,ARM,LEG 2.6-7.5 CM: ICD-10-PCS | Mod: S$GLB,,, | Performed by: DERMATOLOGY

## 2019-09-18 PROCEDURE — 88304 TISSUE EXAM BY PATHOLOGIST: CPT | Performed by: PATHOLOGY

## 2019-09-18 PROCEDURE — 99499 NO LOS: ICD-10-PCS | Mod: S$GLB,,, | Performed by: DERMATOLOGY

## 2019-09-18 PROCEDURE — 11402 EXC TR-EXT B9+MARG 1.1-2 CM: CPT | Mod: 59,S$GLB,, | Performed by: DERMATOLOGY

## 2019-09-18 PROCEDURE — 88304 TISSUE EXAM BY PATHOLOGIST: CPT | Mod: 26,,, | Performed by: PATHOLOGY

## 2019-09-18 NOTE — PROGRESS NOTES
PREOPERATIVE DIAGNOSIS: EIC  LOCATION: left upper back    POSTOPERATIVE DIAGNOSIS: Same.    OPERATION PERFORMED: Excision with Intermediate repair.    SURGEON(S):   Gracie Duarte MD    ASSISTANT(S):  Aida Perdomo LPN    ANESTHESIA: Local.    INDICATIONS FOR PROCEDURE: This patient presents with the lesion noted above. History of cyst.  Excision with minimal margins with complex repair is indicated. Risks of pain, bleeding, scarring, infection, and wound dehiscence have been discussed and written informed consent obtained.    A timeout was completed, verifying correct patient, procedure, supplies, site, positioning, and implant(s) or special equipment.    PROCEDURE AND FINDINGS: The patient was placed on the operating room table. The lesion site was outlined and shown to the patient, who agreed that this was the correct site. The area was anesthetized with 1% lidocaine with epinephrine, washed with Exidine, rinsed with saline and draped with sterile towels. The cyst measuring 1.3 cm in greatest diameter was excised with minimal margins to the underlying subcutaneous tissue. The wound edges were extensively undermined. Meticulous hemostasis was obtained. Subcutaneous, dermal and epidermal closure was performed using 4-0 vicryl and 4-0 ethilon sutures. The final wound length was 2.6 cm. A pressure dressing was applied. A wound care and pain management handout with emergency phone numbers was discussed and given to the patient. The patient was discharged ambulatory with stable vital signs.    ESTIMATED BLOOD LOSS: Minimal    COMPLICATIONS: None.

## 2019-09-23 ENCOUNTER — PATIENT OUTREACH (OUTPATIENT)
Dept: OTHER | Facility: OTHER | Age: 74
End: 2019-09-23

## 2019-09-23 NOTE — PROGRESS NOTES
09/23/19 10:49 AM - Called patient and left voicemail with call back number. Will follow up with patient at next encounter.
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
## 2019-10-08 NOTE — PROGRESS NOTES
Digital Medicine: Clinician Follow-Up    Called patient to follow up. Patient endorses adherence to medication regimen. Patient denies hypotensive s/sx (lightheadedness, dizziness, nausea, fatigue); patient denies hypertensive s/sx (SOB, CP, severe headaches, changes in vision). Instructed patient to seek medical care if BP > 180/110 and is accompanied by hypertensive s/sx associated, patient confirms understanding. She reports that she sits at her table with her feet flat on the floor, back supported by the back of the chair, and both arms on the table. She cannot identify any factors at this time that contribute to her BP elevations.       The history is provided by the patient. No  was used.     Follow Up  Follow-up reason(s): routine education        Last 5 Patient Entered Readings                                      Current 30 Day Average: 158/74     Recent Readings 10/7/2019 10/3/2019 10/2/2019 9/28/2019 9/27/2019    SBP (mmHg) 154 160 157 161 163    DBP (mmHg) 72 74 70 76 68    Pulse 90 86 86 78 78            Sleep Apnea  Patient not previously diagnosed with SARBJIT and         Medication Adherence:   She misses doses: never    Patient is not selectively taking diuretics.    She does not wonder if medications are working.  She knows purpose of medications.      Patient identified the following reasons for non-compliance: none    Adherence tools used: none    Assessment:  Reviewed recent readings. Per 2017 ACC/ AHA HTN guidelines (goal of BP < 130/80), current 30-day average need to be addressed more throroughly today.         INTERVENTION(S)  reviewed appropriate dose schedule, recommended diet modifications, recommended physical activity, reviewed monitoring technique, recommended med change, encouragement/support and goal setting    PLAN  patient verbalizes understanding, patient amenable to changes and patient unable to make changes at this time    Continue current medication regimen. In  future, may consider discontinue olmesartan and initiate irbesartan 150 mg daily. Patient would not like to make change at this time due to just picking up olmesartan from pharmacy.  I will continue to monitor regularly and will follow-up in 2 to 3 weeks, sooner if blood pressure begins to trend upward or downward.       There are no preventive care reminders to display for this patient.    Hypertension Medications             amLODIPine (NORVASC) 10 MG tablet TAKE 1 TABLET BY MOUTH EVERY DAY    olmesartan (BENICAR) 40 MG tablet Take 1 tablet (40 mg total) by mouth once daily.

## 2019-10-15 RX ORDER — AMLODIPINE BESYLATE 10 MG/1
TABLET ORAL
Qty: 30 TABLET | Refills: 0 | Status: SHIPPED | OUTPATIENT
Start: 2019-10-15 | End: 2019-10-23 | Stop reason: SDUPTHER

## 2019-10-16 ENCOUNTER — LAB VISIT (OUTPATIENT)
Dept: LAB | Facility: HOSPITAL | Age: 74
End: 2019-10-16
Attending: INTERNAL MEDICINE
Payer: MEDICARE

## 2019-10-16 DIAGNOSIS — I10 ESSENTIAL HYPERTENSION: ICD-10-CM

## 2019-10-16 DIAGNOSIS — E11.9 TYPE 2 DIABETES MELLITUS WITHOUT COMPLICATION, WITHOUT LONG-TERM CURRENT USE OF INSULIN: ICD-10-CM

## 2019-10-16 LAB
ANION GAP SERPL CALC-SCNC: 8 MMOL/L (ref 8–16)
BUN SERPL-MCNC: 9 MG/DL (ref 8–23)
CALCIUM SERPL-MCNC: 9.6 MG/DL (ref 8.7–10.5)
CHLORIDE SERPL-SCNC: 102 MMOL/L (ref 95–110)
CO2 SERPL-SCNC: 27 MMOL/L (ref 23–29)
CREAT SERPL-MCNC: 0.8 MG/DL (ref 0.5–1.4)
EST. GFR  (AFRICAN AMERICAN): >60 ML/MIN/1.73 M^2
EST. GFR  (NON AFRICAN AMERICAN): >60 ML/MIN/1.73 M^2
ESTIMATED AVG GLUCOSE: 169 MG/DL (ref 68–131)
GLUCOSE SERPL-MCNC: 163 MG/DL (ref 70–110)
HBA1C MFR BLD HPLC: 7.5 % (ref 4–5.6)
POTASSIUM SERPL-SCNC: 4.2 MMOL/L (ref 3.5–5.1)
SODIUM SERPL-SCNC: 137 MMOL/L (ref 136–145)

## 2019-10-16 PROCEDURE — 80048 BASIC METABOLIC PNL TOTAL CA: CPT

## 2019-10-16 PROCEDURE — 36415 COLL VENOUS BLD VENIPUNCTURE: CPT | Mod: PO

## 2019-10-16 PROCEDURE — 83036 HEMOGLOBIN GLYCOSYLATED A1C: CPT

## 2019-10-23 ENCOUNTER — OFFICE VISIT (OUTPATIENT)
Dept: FAMILY MEDICINE | Facility: CLINIC | Age: 74
End: 2019-10-23
Payer: MEDICARE

## 2019-10-23 VITALS
WEIGHT: 194.31 LBS | BODY MASS INDEX: 35.76 KG/M2 | HEIGHT: 62 IN | OXYGEN SATURATION: 96 % | DIASTOLIC BLOOD PRESSURE: 78 MMHG | HEART RATE: 83 BPM | TEMPERATURE: 98 F | SYSTOLIC BLOOD PRESSURE: 118 MMHG

## 2019-10-23 DIAGNOSIS — Z71.89 GRIEF COUNSELING: Primary | ICD-10-CM

## 2019-10-23 DIAGNOSIS — R80.9 TYPE 2 DIABETES MELLITUS WITH MICROALBUMINURIA, WITHOUT LONG-TERM CURRENT USE OF INSULIN: ICD-10-CM

## 2019-10-23 DIAGNOSIS — I10 ESSENTIAL HYPERTENSION: ICD-10-CM

## 2019-10-23 DIAGNOSIS — E11.29 TYPE 2 DIABETES MELLITUS WITH MICROALBUMINURIA, WITHOUT LONG-TERM CURRENT USE OF INSULIN: ICD-10-CM

## 2019-10-23 PROCEDURE — 3074F PR MOST RECENT SYSTOLIC BLOOD PRESSURE < 130 MM HG: ICD-10-PCS | Mod: CPTII,S$GLB,, | Performed by: INTERNAL MEDICINE

## 2019-10-23 PROCEDURE — 3078F DIAST BP <80 MM HG: CPT | Mod: CPTII,S$GLB,, | Performed by: INTERNAL MEDICINE

## 2019-10-23 PROCEDURE — 99214 OFFICE O/P EST MOD 30 MIN: CPT | Mod: S$GLB,,, | Performed by: INTERNAL MEDICINE

## 2019-10-23 PROCEDURE — 1101F PR PT FALLS ASSESS DOC 0-1 FALLS W/OUT INJ PAST YR: ICD-10-PCS | Mod: CPTII,S$GLB,, | Performed by: INTERNAL MEDICINE

## 2019-10-23 PROCEDURE — 3074F SYST BP LT 130 MM HG: CPT | Mod: CPTII,S$GLB,, | Performed by: INTERNAL MEDICINE

## 2019-10-23 PROCEDURE — 99499 RISK ADDL DX/OHS AUDIT: ICD-10-PCS | Mod: S$GLB,,, | Performed by: INTERNAL MEDICINE

## 2019-10-23 PROCEDURE — 1101F PT FALLS ASSESS-DOCD LE1/YR: CPT | Mod: CPTII,S$GLB,, | Performed by: INTERNAL MEDICINE

## 2019-10-23 PROCEDURE — 99214 PR OFFICE/OUTPT VISIT, EST, LEVL IV, 30-39 MIN: ICD-10-PCS | Mod: S$GLB,,, | Performed by: INTERNAL MEDICINE

## 2019-10-23 PROCEDURE — 99999 PR PBB SHADOW E&M-EST. PATIENT-LVL III: CPT | Mod: PBBFAC,,, | Performed by: INTERNAL MEDICINE

## 2019-10-23 PROCEDURE — 99999 PR PBB SHADOW E&M-EST. PATIENT-LVL III: ICD-10-PCS | Mod: PBBFAC,,, | Performed by: INTERNAL MEDICINE

## 2019-10-23 PROCEDURE — 3078F PR MOST RECENT DIASTOLIC BLOOD PRESSURE < 80 MM HG: ICD-10-PCS | Mod: CPTII,S$GLB,, | Performed by: INTERNAL MEDICINE

## 2019-10-23 PROCEDURE — 99499 UNLISTED E&M SERVICE: CPT | Mod: S$GLB,,, | Performed by: INTERNAL MEDICINE

## 2019-10-23 RX ORDER — METFORMIN HYDROCHLORIDE 500 MG/1
500 TABLET, EXTENDED RELEASE ORAL
Qty: 90 TABLET | Refills: 3 | Status: SHIPPED | OUTPATIENT
Start: 2019-10-23 | End: 2020-08-26

## 2019-10-23 RX ORDER — AMLODIPINE BESYLATE 10 MG/1
10 TABLET ORAL DAILY
Qty: 90 TABLET | Refills: 3 | Status: SHIPPED | OUTPATIENT
Start: 2019-10-23 | End: 2020-09-30

## 2019-10-23 RX ORDER — ALLOPURINOL 100 MG/1
100 TABLET ORAL DAILY
Qty: 90 TABLET | Refills: 3 | Status: SHIPPED | OUTPATIENT
Start: 2019-10-23 | End: 2020-09-30

## 2019-10-23 NOTE — PROGRESS NOTES
Primary Care Behavioral Health: Initial  Patient Name:  Shawanda Desir  Date:  10/24/19  Site:  [facility]  Referral source:  Vi Dubose MD    Chief complaint/reason for encounter:  Grief counseling    History of present illness:  Ms. Shawanda Desir is a 74 y.o.   female referred by Vi Dubose MD for symptoms of Grief counseling . Patient was seen, examined and chart was reviewed.  Patient notes 19 years ago her son was killed. Patient notes the two people who killed him are incarcerated however one recently appealed in April.  Patient notes appeal was denied.  Patient notes in June her son's grave was covered during a movie shoot at the [employer] and this was not removed.  Patient notes for a period of time they believed her sons grave had been moved and they could not find it.  Patient notes this caused her immense psychological distress and she felt very overwhelmed at that time.  Patient notes in August 2019 she had a 'light heart attack.'    Past Medical History:   Diagnosis Date    Diabetes mellitus     Hypertension       Current Outpatient Medications:     allopurinol (ZYLOPRIM) 100 MG tablet, Take 1 tablet (100 mg total) by mouth once daily., Disp: 90 tablet, Rfl: 3    amLODIPine (NORVASC) 10 MG tablet, Take 1 tablet (10 mg total) by mouth once daily., Disp: 90 tablet, Rfl: 3    aspirin (ECOTRIN) 81 MG EC tablet, Take 81 mg by mouth once daily., Disp: , Rfl:     atorvastatin (LIPITOR) 10 MG tablet, Take 1 tablet (10 mg total) by mouth every evening., Disp: 90 tablet, Rfl: 3    metFORMIN (GLUCOPHAGE-XR) 500 MG 24 hr tablet, Take 1 tablet (500 mg total) by mouth daily with breakfast., Disp: 90 tablet, Rfl: 3    olmesartan (BENICAR) 40 MG tablet, Take 1 tablet (40 mg total) by mouth once daily., Disp: 90 tablet, Rfl: 3    Psychiatric history:  Previous diagnosis: Patient denies.  Previous medications: Patient denies.  Previous hospitalizations: Patient  denies.  History of outpatient treatment: Patient denies.  Previous suicide attempt: Patient denies.  Family history of psychiatric illness:  Patient denies.    Current and past substance use:  Alcohol:  Denied current use.  Denied history of abuse or dependency.  Drugs:  Denied current use.  Denied history of abuse or dependency.  Tobacco:  Denied current use.  Caffeine:  Denied current use.    Psychiatric symptoms:  Depression:  Patient endorses symptoms of dysphoric mood, tearfulness, irritability and poor frustration tolerance.  She denied suicidal ideation.  Kori/Hypomania:  Denied.  She denied periods of elevated mood or abnormally increased energy or goal-directed activity.  Anxiety:  Patient endorses symptoms of nervousness, restlessness, increased worry and feeling overwhelmed.    Thoughts:  Denied delusions, hallucinations.  Suicidal thoughts/behaviors: Patient denied suicidal and homicidal ideation, plan and intent.  Patient noted agreement to call 911/and or present to the ED if she experienced suicidal or homicidal ideation, plan or intent.    Self-injury:  Denied.  Sleep: Patient denies sleep difficulty.  Trauma:  Patient names the passing of her son as a major tragedy she experienced.  Patient also names recent event where his grave was tampered with as a major tragedy she experienced.  Patient denies nightmares, flashbacks or hyperarousal to surroundings.  Patient notes she does become overwhelmed and anxious when she thinks about her sons grave being tampered with and when she thinks about how overwhelmed she felt when she thought his grave had been moved.    Mental Status Exam:  General appearance:  appears stated age, neatly dressed, well groomed  Speech:  Clear and intelligible, normal rate, normal tone, normal pitch, normal volume  Level of cooperation:  cooperative  Thought processes:  Linear, logical, goal-directed  Mood:  Anxious  Thought content:  Relevant and appropriate, no illusions, no  visual hallucinations, no auditory hallucinations, no delusions, no active or passive homicidal thoughts, no active or passive suicidal ideation, no obsessions, no compulsions, no violence  Affect:  Nervous and restless  Orientation:  oriented to person, place, situation and date  Memory/Attention/Concentration:  No gross cognitive deficits made evident during conversation.  Judgment and insight: fair  Language:  Intact    PHQ9 10/24/2019   Total Score 8     GAD7 10/24/2019   GERARDO-7 Score 8     Impression: Patient presents today endorsing symptoms of anxiety and depression throughout the last 6 months.  Patient notes symptoms of anxiety and depression are related to recent incident regarding her sons grave.  Patient notes she is seeking treatment to assist with management of symptoms due to blood pressure related issues and 'light heart attack' she experienced in August.      Plan:    1.  Patient provided psychoeducation on anxiety, depression and bereavement.  2.  Stress management discussed.  3.  Deep breathing and relaxation strategies discussed.  4. Increasing opportunities for socialization discussed; patient notes socialization and spending time with family assisted when grieving her son and she believes this would be helpful at this tie as well.  5.  Patient to follow-up in 4-6 weeks.     Length of Session:  40 minutes

## 2019-10-23 NOTE — PROGRESS NOTES
Subjective:       Patient ID: Shawanda Desir is a 74 y.o. female.    Chief Complaint: Medication Refill and Follow-up    Pt here for check up. She is currently struggling with some grief regarding her son's death and would like to talke to someone. She is not interested in medication.      DM: a1c 7.5 off medication.  She would be ok trying metformin again at lower dose.   HTN- stable on amlodipine and benicar    Review of Systems   Constitutional: Negative for fatigue, fever and unexpected weight change.   HENT: Negative for congestion, postnasal drip, sinus pain and sore throat.    Eyes: Negative for visual disturbance.   Respiratory: Negative for cough, chest tightness, shortness of breath and wheezing.    Cardiovascular: Negative for chest pain, palpitations and leg swelling.   Gastrointestinal: Negative for abdominal pain, blood in stool, constipation, diarrhea, nausea and vomiting.   Endocrine: Negative for cold intolerance and heat intolerance.   Genitourinary: Negative for difficulty urinating, dysuria and frequency.   Musculoskeletal: Negative for back pain, joint swelling and myalgias.   Skin: Negative for rash.   Allergic/Immunologic: Negative for environmental allergies.   Neurological: Negative for dizziness, seizures, numbness and headaches.   Hematological: Does not bruise/bleed easily.   Psychiatric/Behavioral: Negative for agitation and sleep disturbance.       Objective:      Physical Exam   Constitutional: She is oriented to person, place, and time. She appears well-developed and well-nourished.   HENT:   Head: Normocephalic and atraumatic.   Mouth/Throat: Oropharynx is clear and moist.   Eyes: Pupils are equal, round, and reactive to light. Conjunctivae and EOM are normal.   Neck: Normal range of motion. Neck supple. No thyromegaly present.   Cardiovascular: Normal rate, regular rhythm, normal heart sounds and intact distal pulses. Exam reveals no gallop and no friction rub.   No murmur  heard.  Pulmonary/Chest: Effort normal and breath sounds normal. No respiratory distress. She has no wheezes. She has no rales.   Abdominal: Soft. Bowel sounds are normal. She exhibits no distension. There is no tenderness.   Musculoskeletal: Normal range of motion. She exhibits no edema.   Lymphadenopathy:     She has no cervical adenopathy.   Neurological: She is alert and oriented to person, place, and time. No cranial nerve deficit.   Skin: Skin is warm and dry.   Psychiatric: She has a normal mood and affect. Her behavior is normal.       Assessment:       1. Grief counseling    2. Essential hypertension    3. Type 2 diabetes mellitus with microalbuminuria, without long-term current use of insulin        Plan:       htn- stable continue med  DM- add met fomrin 500 xr to see if tolerates. Fu 3 mo with a1c  Referred to psychology for pt's grief to discuss possible options

## 2019-10-24 ENCOUNTER — OFFICE VISIT (OUTPATIENT)
Dept: PSYCHIATRY | Facility: CLINIC | Age: 74
End: 2019-10-24
Payer: MEDICARE

## 2019-10-24 DIAGNOSIS — F43.23 ADJUSTMENT DISORDER WITH MIXED ANXIETY AND DEPRESSED MOOD: Primary | ICD-10-CM

## 2019-10-24 PROCEDURE — 99499 UNLISTED E&M SERVICE: CPT | Mod: S$GLB,,, | Performed by: PSYCHOLOGIST

## 2019-10-24 PROCEDURE — 99499 RISK ADDL DX/OHS AUDIT: ICD-10-PCS | Mod: S$GLB,,, | Performed by: PSYCHOLOGIST

## 2019-10-24 PROCEDURE — 90791 PR PSYCHIATRIC DIAGNOSTIC EVALUATION: ICD-10-PCS | Mod: S$GLB,,, | Performed by: PSYCHOLOGIST

## 2019-10-24 PROCEDURE — 90791 PSYCH DIAGNOSTIC EVALUATION: CPT | Mod: S$GLB,,, | Performed by: PSYCHOLOGIST

## 2019-10-25 ENCOUNTER — PATIENT OUTREACH (OUTPATIENT)
Dept: OTHER | Facility: OTHER | Age: 74
End: 2019-10-25

## 2019-10-25 NOTE — PROGRESS NOTES
Digital Medicine: Clinician Follow-Up    Called patient to follow up. Patient endorses adherence to medication regimen. Patient denies hypotensive s/sx (lightheadedness, dizziness, nausea, fatigue); patient denies hypertensive s/sx (SOB, CP, severe headaches, changes in vision). Instructed patient to seek medical care if BP > 180/110 and is accompanied by hypertensive s/sx associated, patient confirms understanding. Her BP in office on Wednesday was 118/78 mmHg. She reports that she has been going to psych consults which has been helping her to alleviate stress. She states that she forgets to check her BP however she never misses her antihypertensive medications. She reports that when she thinks about the her son, it causes her to become stressed but she has learned to transfer the stress somewhere else.      The history is provided by the patient. No  was used.     Follow Up  Follow-up reason(s): routine education        Last 5 Patient Entered Readings                                      Current 30 Day Average: 159/74     Recent Readings 10/18/2019 10/17/2019 10/17/2019 10/14/2019 10/14/2019    SBP (mmHg) 156 163 166 161 175    DBP (mmHg) 80 77 77 75 77    Pulse 90 84 84 79 80               Sleep Apnea  Patient not previously diagnosed with SARBJIT and     Medication Affordability  Patient is currently not having problems affording medications    Medication Adherence:   She misses doses: never    Patient is not selectively taking diuretics.    She does not wonder if medications are working.  She knows purpose of medications.      Patient identified the following reasons for non-compliance: none    Adherence tools used: none    Assessment:  Reviewed recent readings. Per 2017 ACC/ AHA HTN guidelines (goal of BP < 130/80), current 30-day average need to be addressed more throroughly today.         INTERVENTION(S)  reviewed appropriate dose schedule, reviewed monitoring technique, encouragement/support  and goal setting    PLAN  patient verbalizes understanding and demonstrates understanding via teach back    Continue current medication regimen. I will continue to monitor regularly and will follow-up in 4 weeks, sooner if blood pressure begins to trend upward or downward.       There are no preventive care reminders to display for this patient.      Hypertension Medications             amLODIPine (NORVASC) 10 MG tablet Take 1 tablet (10 mg total) by mouth once daily.    olmesartan (BENICAR) 40 MG tablet Take 1 tablet (40 mg total) by mouth once daily.

## 2019-10-30 ENCOUNTER — TELEPHONE (OUTPATIENT)
Dept: PSYCHIATRY | Facility: CLINIC | Age: 74
End: 2019-10-30

## 2019-10-30 NOTE — TELEPHONE ENCOUNTER
Needed number to medical records for recorders to be mailed over to her house.     Informed her the number to call

## 2019-11-01 ENCOUNTER — PATIENT OUTREACH (OUTPATIENT)
Dept: OTHER | Facility: OTHER | Age: 74
End: 2019-11-01

## 2019-11-01 ENCOUNTER — TELEPHONE (OUTPATIENT)
Dept: FAMILY MEDICINE | Facility: CLINIC | Age: 74
End: 2019-11-01

## 2019-11-01 NOTE — TELEPHONE ENCOUNTER
----- Message from Priscilla Askew sent at 11/1/2019 11:48 AM CDT -----  Contact: PT  Pt states the Metformin that the Dr put her on making her side hurt and bothering her so she has stop taking it...[phone] (home)

## 2019-11-01 NOTE — PROGRESS NOTES
"Digital Medicine: Health  Follow-Up  Patient is feeling good at this time with no questions or concerns about her Hypertension.   Patient states her doctor recently put her on a "lower dose" of metformin despite her previously having side effects. Patient states since starting the Metformin, she is having "side pain so bad she can't sleep." Per patient: "I'm worried the metformin is doing something to my kidneys." Patient recently took herself off the Metformin and is no longer having pains.     The history is provided by the patient.         Encouraged patient to hang up and call her doctor to inform them of side effects and self-adjusted meds and patient agreed. Agreed to f/u in a few weeks unless further assistance is needed.       There are no preventive care reminders to display for this patient.    Last 5 Patient Entered Readings                                      Current 30 Day Average: 157/75     Recent Readings 11/1/2019 11/1/2019 11/1/2019 11/1/2019 11/1/2019    SBP (mmHg) 159 163 167 149 165    DBP (mmHg) 74 76 79 83 76    Pulse 84 84 87 87 87        "

## 2019-11-05 ENCOUNTER — TELEPHONE (OUTPATIENT)
Dept: ORTHOPEDICS | Facility: CLINIC | Age: 74
End: 2019-11-05

## 2019-11-05 NOTE — TELEPHONE ENCOUNTER
----- Message from Ann Juárez sent at 11/5/2019 11:37 AM CST -----  Contact: Shawanda pt  Type: Needs Medical Advice    Who Called:  Shawanda  Symptoms (please be specific):  Pt's knees are hurting her again  How long has patient had these symptoms:  ongoing  Best Call Back Number: [phone]  Additional Information: Pls call pt regarding possibly getting another shot in her knees.

## 2019-11-07 ENCOUNTER — OFFICE VISIT (OUTPATIENT)
Dept: ORTHOPEDICS | Facility: CLINIC | Age: 74
End: 2019-11-07
Payer: MEDICARE

## 2019-11-07 VITALS
WEIGHT: 194.44 LBS | HEART RATE: 86 BPM | DIASTOLIC BLOOD PRESSURE: 79 MMHG | HEIGHT: 62 IN | BODY MASS INDEX: 35.78 KG/M2 | SYSTOLIC BLOOD PRESSURE: 154 MMHG

## 2019-11-07 DIAGNOSIS — M17.0 BILATERAL PRIMARY OSTEOARTHRITIS OF KNEE: Primary | ICD-10-CM

## 2019-11-07 PROCEDURE — 99213 OFFICE O/P EST LOW 20 MIN: CPT | Mod: 25,S$GLB,, | Performed by: NURSE PRACTITIONER

## 2019-11-07 PROCEDURE — 20610 DRAIN/INJ JOINT/BURSA W/O US: CPT | Mod: 50,S$GLB,, | Performed by: NURSE PRACTITIONER

## 2019-11-07 PROCEDURE — 99999 PR PBB SHADOW E&M-EST. PATIENT-LVL III: ICD-10-PCS | Mod: PBBFAC,,, | Performed by: NURSE PRACTITIONER

## 2019-11-07 PROCEDURE — 20610 LARGE JOINT ASPIRATION/INJECTION: R KNEE, L KNEE: ICD-10-PCS | Mod: 50,S$GLB,, | Performed by: NURSE PRACTITIONER

## 2019-11-07 PROCEDURE — 99999 PR PBB SHADOW E&M-EST. PATIENT-LVL III: CPT | Mod: PBBFAC,,, | Performed by: NURSE PRACTITIONER

## 2019-11-07 PROCEDURE — 99213 PR OFFICE/OUTPT VISIT, EST, LEVL III, 20-29 MIN: ICD-10-PCS | Mod: 25,S$GLB,, | Performed by: NURSE PRACTITIONER

## 2019-11-07 RX ORDER — TRIAMCINOLONE ACETONIDE 40 MG/ML
40 INJECTION, SUSPENSION INTRA-ARTICULAR; INTRAMUSCULAR
Status: DISCONTINUED | OUTPATIENT
Start: 2019-11-07 | End: 2019-11-07 | Stop reason: HOSPADM

## 2019-11-07 RX ADMIN — TRIAMCINOLONE ACETONIDE 40 MG: 40 INJECTION, SUSPENSION INTRA-ARTICULAR; INTRAMUSCULAR at 03:11

## 2019-11-07 NOTE — PROGRESS NOTES
DATE: 11/7/2019  PATIENT: Shawanda Deisr  REFERRING MD:   CHIEF COMPLAINT:   Chief Complaint   Patient presents with    Left Knee - Pain    Right Knee - Pain       HISTORY:  Shawanda Desir is a 74 y.o. female  who presents for repeat evaluation of her bilateral knee pain. She is a patient of mine diagnosed with bilateral bone on bone osteoarthritis.  She had cortisone injections in May which provided moderate relief.  She reports she has had relief of stiffness and pain and has been able to work with her garden. She would like to avoid surgery.  She is requesting bilateral cortisone injections today.  She does not care to try orthotic bracing.    PAST MEDICAL/SURGICAL HISTORY:  Past Medical History:   Diagnosis Date    Diabetes mellitus     Hypertension      Past Surgical History:   Procedure Laterality Date    BACK SURGERY      CERVICAL SPINE SURGERY         Current Medications:   Current Outpatient Medications:     allopurinol (ZYLOPRIM) 100 MG tablet, Take 1 tablet (100 mg total) by mouth once daily., Disp: 90 tablet, Rfl: 3    amLODIPine (NORVASC) 10 MG tablet, Take 1 tablet (10 mg total) by mouth once daily., Disp: 90 tablet, Rfl: 3    aspirin (ECOTRIN) 81 MG EC tablet, Take 81 mg by mouth once daily., Disp: , Rfl:     atorvastatin (LIPITOR) 10 MG tablet, Take 1 tablet (10 mg total) by mouth every evening., Disp: 90 tablet, Rfl: 3    metFORMIN (GLUCOPHAGE-XR) 500 MG 24 hr tablet, Take 1 tablet (500 mg total) by mouth daily with breakfast., Disp: 90 tablet, Rfl: 3    olmesartan (BENICAR) 40 MG tablet, Take 1 tablet (40 mg total) by mouth once daily., Disp: 90 tablet, Rfl: 3    Family History: family history was reviewed and is noncontributory  Social History:   Social History     Socioeconomic History    Marital status:      Spouse name: Not on file    Number of children: Not on file    Years of education: Not on file    Highest education level: Not on file   Occupational History    Not  "on file   Social Needs    Financial resource strain: Not on file    Food insecurity:     Worry: Not on file     Inability: Not on file    Transportation needs:     Medical: Not on file     Non-medical: Not on file   Tobacco Use    Smoking status: Never Smoker   Substance and Sexual Activity    Alcohol use: No     Frequency: Never    Drug use: No    Sexual activity: Not on file   Lifestyle    Physical activity:     Days per week: Not on file     Minutes per session: Not on file    Stress: Not on file   Relationships    Social connections:     Talks on phone: Not on file     Gets together: Not on file     Attends Orthodoxy service: Not on file     Active member of club or organization: Not on file     Attends meetings of clubs or organizations: Not on file     Relationship status: Not on file   Other Topics Concern    Are you pregnant or think you may be? Not Asked    Breast-feeding Not Asked   Social History Narrative    Not on file       ROS:  Constitution: Negative for chills, fever, and sweats. Negative for unexplained weight loss.  HENT: Negative for headaches and blurry vision.   Cardiovascular: Negative for chest pain, irregular heartbeat, leg swelling and palpitations.   Respiratory: Negative for cough and shortness of breath.   Gastrointestinal: Negative for abdominal pain, heartburn, nausea and vomiting.   Genitourinary: Negative for bladder incontinence and dysuria.   Musculoskeletal: Negative for systemic arthritis, joint swelling, muscle weakness and myalgias.   Neurological: Negative for numbness.   Psychiatric/Behavioral: Negative for depression.   Endocrine: Negative for polyuria.   Hematologic/Lymphatic: Negative for bleeding disorders.  Skin: Negative for poor wound healing.       PHYSICAL EXAM:  BP (!) 154/79   Pulse 86   Ht 5' 2" (1.575 m)   Wt 88.2 kg (194 lb 7.1 oz)   BMI 35.56 kg/m²   Shawanda Desir is a well developed, well nourished female in no acute distress. Physical " examination of the bilateral knee evaluated the following:    Gait and Alignment  Inspection for ecchymosis, swelling, atrophy, or deformity  Inspection for intra-articular and/or bursal effusions  Tenderness to palpation over the bony and soft tissue structures around the knee  Range of Motion and presence of extensor lag/contractures  Sensation and motor strength  Varus/valgus or anterior/posterior/rotatory instability  Flexion pinch and Aide's Tests  Patellar alignment/tracking/pain to palpation  Vascular exam to include skin temperature/color/capillary refill    Remarkable findings included:  Mild edema bilaterally, bony arthritic enlargement  nontender to palpation  ROM 0-130 degrees flexion  Sensation intact  Skin warm, dry, intact    IMAGING:   X-ray obtained Bilateral knee performed 11/08/18 personally reviewed with patient. Radiologist report as follows:   There is advanced degenerative arthrosis of the medial compartments of both knees with severe joint space narrowing and periarticular osteophyte formation.  There is also degenerative arthrosis of the lateral compartment of the right knee with joint space narrowing and periarticular osteophyte formation.  There are periarticular osteophytes of the lateral compartment of the left knee there is relatively severe degenerative arthrosis of the bilateral patellofemoral articulations with patellofemoral space narrowing and periarticular osteophyte formation.  No fracture or subluxation are identified.  There are no signs of joint effusion on either side.    ASSESSMENT:   Bilateral severe osteoarthrosis    PLAN:  The nature of the diagnosis, using models and diagrams when appropriate, was explained to the patient in detail. Treatment option discussed included non-operative measures of rest, modification of activities, application of ice, elevation of extremity, compression, over the counter pain/antiinflammatory relief, physical therapy, cortisone injection,  or visco supplementation.  More aggressive treatment options include referral for arthroplasty.  All questions answered and the patient wishes to proceed today with bilateral cortisone injections.  Bilateral knee cortisone injection performed today (see procedure documentation).  I have instructed to monitor injection site for signs and symptoms of inflammation/infection.  I have instructed to elevate and apply ice to knees this evening.  I have given her a handout for Euflexxa.  She will call for follow up if no improvement or worsening of symptoms.

## 2019-11-07 NOTE — PROCEDURES
Large Joint Aspiration/Injection: R knee, L knee  Date/Time: 11/7/2019 3:45 PM  Performed by: KELECHI Cowart  Authorized by: KELECHI Cowart     Consent Done?:  Yes (Verbal)  Indications:  Pain  Timeout: Prior to procedure the correct patient, procedure, and site was verified    Anesthesia    Anesthetic: topical anesthetic    Location:  Knee  Site:  R knee and L knee  Prep: Patient was prepped and draped in usual sterile fashion    Needle size:  22 G  Ultrasonic Guidance for needle placement: No  Approach:  Anterolateral  Medications:  40 mg triamcinolone acetonide 40 mg/mL; 40 mg triamcinolone acetonide 40 mg/mL  Patient tolerance:  Patient tolerated the procedure well with no immediate complications

## 2019-11-18 DIAGNOSIS — E78.49 OTHER HYPERLIPIDEMIA: ICD-10-CM

## 2019-11-18 NOTE — PROGRESS NOTES
Refill Authorization Note     is requesting a refill authorization.    Brief assessment and rationale for refill: APPROVE: prr   Name and strength of medication: atorvastatin (LIPITOR) 10 MG tablet            Medication reconciliation completed: No                         Comments:  Requested Prescriptions   Pending Prescriptions Disp Refills    atorvastatin (LIPITOR) 10 MG tablet [Pharmacy Med Name: ATORVASTATIN 10 MG TABLET] 90 tablet 2     Sig: TAKE 1 TABLET BY MOUTH EVERY DAY IN THE EVENING       Cardiovascular:  Antilipid - Statins Passed - 11/18/2019  2:23 AM        Passed - Patient is at least 18 years old        Passed - Office visit in past 12 months or future 90 days     Recent Outpatient Visits            1 week ago Bilateral primary osteoarthritis of knee    [facility] KELECHI Cowart    3 weeks ago Adjustment disorder with mixed anxiety and depressed mood    [facility] Mily Hart PsyD    3 weeks ago Grief counseling    [facility] Vi Dubose MD    2 months ago Essential hypertension    [facility] Vi Dubose MD    2 months ago Essential hypertension    [facility] Vi Dubose MD          Future Appointments              In 2 months Vi Dubose MD [facility]                Passed - Lipid Panel completed in last 360 days     Lab Results   Component Value Date    CHOL 192 08/01/2019    HDL 50 08/01/2019    LDLCALC 125.2 08/01/2019    TRIG 84 08/01/2019             Passed - ALT is 94 or below and within 360 days     ALT   Date Value Ref Range Status   08/01/2019 14 10 - 44 U/L Final   05/15/2019 14 10 - 44 U/L Final   02/08/2019 16 10 - 44 U/L Final              Passed - AST is 54 or below and within 360 days     AST   Date Value Ref Range Status   08/01/2019 14 10 - 40 U/L Final   05/15/2019 14 10 - 40 U/L Final   02/08/2019 19 10 - 40 U/L Final      Comment:     *Result may be interfered by visible hemolysis

## 2019-11-19 RX ORDER — ATORVASTATIN CALCIUM 10 MG/1
TABLET, FILM COATED ORAL
Qty: 90 TABLET | Refills: 2 | Status: SHIPPED | OUTPATIENT
Start: 2019-11-19 | End: 2020-07-16 | Stop reason: SDUPTHER

## 2019-11-22 ENCOUNTER — PATIENT OUTREACH (OUTPATIENT)
Dept: OTHER | Facility: OTHER | Age: 74
End: 2019-11-22

## 2019-11-22 NOTE — PROGRESS NOTES
11/22/19 1:09 PM - Called patient and left voicemail with call back number. Will follow up with patient at next encounter.

## 2019-11-27 NOTE — PROGRESS NOTES
Primary Care Behavioral Health: Follow Up  Patient Name:  Shawanda Desir  Date:  12/2/19  Site:  [facility]  Referral source:  Vi Dubose MD     Chief complaint/reason for encounter:  Grief counseling     History of present illness:  Ms. Shawanda Desir is a 74 y.o.   female referred by Vi Dubose MD for symptoms of Grief counseling . Patient was seen, examined and chart was reviewed.  Patient presents today discussing symptoms of depression and anxiety related to coping with her son's grave being tampered with.  Patient notes she feels overwhelmed that her son's grave was tampered with and is experiencing notable frustration, anger and feeling overwhelmed that his picture was removed and has not been returned.  Patient notes she has been experiencing a desire for social isolation.    Past Medical History:   Diagnosis Date    Diabetes mellitus     Hypertension      Current Outpatient Medications on File Prior to Visit   Medication Sig Dispense Refill    allopurinol (ZYLOPRIM) 100 MG tablet Take 1 tablet (100 mg total) by mouth once daily. 90 tablet 3    amLODIPine (NORVASC) 10 MG tablet Take 1 tablet (10 mg total) by mouth once daily. 90 tablet 3    aspirin (ECOTRIN) 81 MG EC tablet Take 81 mg by mouth once daily.      atorvastatin (LIPITOR) 10 MG tablet TAKE 1 TABLET BY MOUTH EVERY DAY IN THE EVENING 90 tablet 2    metFORMIN (GLUCOPHAGE-XR) 500 MG 24 hr tablet Take 1 tablet (500 mg total) by mouth daily with breakfast. 90 tablet 3    olmesartan (BENICAR) 40 MG tablet Take 1 tablet (40 mg total) by mouth once daily. 90 tablet 3     No current facility-administered medications on file prior to visit.       Psychiatric symptoms:  Depression:  Patient endorses symptoms of dysphoric mood, tearfulness, irritability and poor frustration tolerance.  She denied suicidal ideation.  Kori/Hypomania:  Denied.  She denied periods of elevated mood or abnormally increased energy or  goal-directed activity.  Anxiety:  Patient endorses symptoms of nervousness, restlessness, increased worry and feeling overwhelmed.    Thoughts:  Denied delusions, hallucinations.  Suicidal thoughts/behaviors: Patient denied suicidal and homicidal ideation, plan and intent.  Patient noted agreement to call 911/and or present to the ED if she experienced suicidal or homicidal ideation, plan or intent.    Self-injury:  Denied.  Sleep: Patient denies sleep difficulty.  Trauma:  Patient names the passing of her son as a major tragedy she experienced.  Patient also names recent event where his grave was tampered with as a major tragedy she experienced.  Patient denies nightmares, flashbacks or hyperarousal to surroundings.  Patient notes she does become overwhelmed and anxious when she thinks about her sons grave being tampered with and when she thinks about how overwhelmed she felt when she thought his grave had been moved.  Patient notes at this time she is feeling very overwhelmed that his picture was removed from his grave and has not been returned.     Mental Status Exam:  General appearance:  appears stated age, neatly dressed, well groomed  Speech:  Clear and intelligible, normal rate, normal tone, normal pitch, normal volume  Level of cooperation:  cooperative  Thought processes:  Linear, logical, goal-directed  Mood:  Depressed  Thought content:  Relevant and appropriate, no illusions, no visual hallucinations, no auditory hallucinations, no delusions, no active or passive homicidal thoughts, no active or passive suicidal ideation, no obsessions, no compulsions, no violence  Affect:  Saddened  Orientation:  oriented to person, place, situation and date  Memory/Attention/Concentration:  No gross cognitive deficits made evident during conversation.  Judgment and insight: fair  Language:  Intact     PHQ9 12/2/2019   Total Score 12     GAD7 12/2/2019   GERARDO-7 Score 11        Impression: Patient presents today  endorsing symptoms of anxiety and depression throughout the last 6 months.  Patient notes symptoms of anxiety and depression continue to be related to recent incident regarding her sons grave.     Plan:    1.  Patient provided psychoeducation on anxiety, depression and bereavement.  2.  Stress management discussed.  3.  Deep breathing and relaxation strategies discussed.  4. Increasing opportunities for socialization discussed; patient notes socialization and spending time with family assisted when grieving her son and she believes this would be helpful at this time as well.  5. Pacing and planning daily activities discussed.   6. Patient to follow-up in 4-6 weeks.      Length of Session:  46 minutes

## 2019-12-02 ENCOUNTER — OFFICE VISIT (OUTPATIENT)
Dept: PSYCHIATRY | Facility: CLINIC | Age: 74
End: 2019-12-02
Payer: MEDICARE

## 2019-12-02 DIAGNOSIS — F43.23 ADJUSTMENT DISORDER WITH MIXED ANXIETY AND DEPRESSED MOOD: Primary | ICD-10-CM

## 2019-12-02 PROCEDURE — 99499 UNLISTED E&M SERVICE: CPT | Mod: S$GLB,,, | Performed by: PSYCHOLOGIST

## 2019-12-02 PROCEDURE — 90834 PR PSYCHOTHERAPY W/PATIENT, 45 MIN: ICD-10-PCS | Mod: S$GLB,,, | Performed by: PSYCHOLOGIST

## 2019-12-02 PROCEDURE — 99499 RISK ADDL DX/OHS AUDIT: ICD-10-PCS | Mod: S$GLB,,, | Performed by: PSYCHOLOGIST

## 2019-12-02 PROCEDURE — 90834 PSYTX W PT 45 MINUTES: CPT | Mod: S$GLB,,, | Performed by: PSYCHOLOGIST

## 2019-12-03 ENCOUNTER — PATIENT OUTREACH (OUTPATIENT)
Dept: OTHER | Facility: OTHER | Age: 74
End: 2019-12-03

## 2019-12-03 NOTE — PROGRESS NOTES
Digital Medicine: Health  Follow-Up    Patient is doing good at this time with no questions concerning her BP medication or readings. Patient has a good support system and was surrounded by her family for the holidays. Patient was driving when I called so we agreed to f/u at a better time.    The history is provided by the patient.     There are no preventive care reminders to display for this patient.    Last 5 Patient Entered Readings                                      Current 30 Day Average: 148/76     Recent Readings 12/3/2019 12/3/2019 12/3/2019 12/3/2019 12/2/2019    SBP (mmHg) 137 151 148 158 142    DBP (mmHg) 80 81 91 76 94    Pulse 79 77 80 77 77

## 2019-12-23 NOTE — PROGRESS NOTES
Digital Medicine: Health  Follow-Up    Called to check-in before the holidays. Patient is doing great at this time with a positive attitude and she reports weight loss. Patient set a goal to get down to 180lbs. Encouraged goal.      The history is provided by the patient.     Follow Up  Follow-up reason(s): reading review        INTERVENTION(S)  recommended diet modifications, encouragement/support and goal setting    PLAN  patient verbalizes understanding and continue monitoring    Patient set a goal to weight 180lbs. Patient plans to continue eating smaller portions and work on slowing down the pace that she is eating at. Patient also plans to continue her walking routine. Patient plans to be mindful of her habits over the holidays and agreed to f/u to set new goals after the New Year.      There are no preventive care reminders to display for this patient.    Last 5 Patient Entered Readings                                      Current 30 Day Average: 141/77     Recent Readings 12/18/2019 12/18/2019 12/18/2019 12/18/2019 12/18/2019    SBP (mmHg) 148 146 147 148 148    DBP (mmHg) 73 74 74 79 78    Pulse 80 80 84 81 82                      Diet Screening       Patient attributes her weight loss to changing up her eating habits. Patient is now focusing on eating small portions and feels it is working for her. Suggested patient also work on slowing down how fast she is eating and try putting her fork down in between every bite and she was interested in trying. Encouraged habits, especially over the holidays and patient plans to keep it up.    Intervention(s): portion control    Assigning the following patient goals: reduce portions    Physical Activity Screening   When asked if exercising, patient responded: yesHer level of intensity when exercising is moderate.    Patient participates in the following activities: walking and yard/housework    Patient states she has been increasing her walking routine. Encouraged  routine.

## 2020-01-09 NOTE — PROGRESS NOTES
Primary Care Behavioral Health: Follow Up  Patient Name:  Shawanda Desir  Date:  1/13/2020  Site:  [facility]  Referral source:  Vi Dubose MD     Chief complaint/reason for encounter:  Grief counseling     History of present illness:  Ms. Shawanda Desir is a 74 y.o.   female referred by Vi Dubose MD for symptoms of Grief counseling . Patient was seen, examined and chart was reviewed.  Patient presents today discussing symptoms of depression and anxiety related to coping with her son's grave being tampered with.  Patient notes she feels frustrated that her son's grave was tampered with.  [facility] to experiencing notable frustration, anger and feeling overwhelmed that his picture was removed and has not been returned.  Patient notes she has not returned to the [facility] due ot wanting to remain calm for her physical health.     Past Medical History:   Diagnosis Date    Diabetes mellitus     Hypertension      Current Outpatient Medications on File Prior to Visit   Medication Sig Dispense Refill    allopurinol (ZYLOPRIM) 100 MG tablet Take 1 tablet (100 mg total) by mouth once daily. 90 tablet 3    amLODIPine (NORVASC) 10 MG tablet Take 1 tablet (10 mg total) by mouth once daily. 90 tablet 3    aspirin (ECOTRIN) 81 MG EC tablet Take 81 mg by mouth once daily.      atorvastatin (LIPITOR) 10 MG tablet TAKE 1 TABLET BY MOUTH EVERY DAY IN THE EVENING 90 tablet 2    metFORMIN (GLUCOPHAGE-XR) 500 MG 24 hr tablet Take 1 tablet (500 mg total) by mouth daily with breakfast. 90 tablet 3    olmesartan (BENICAR) 40 MG tablet Take 1 tablet (40 mg total) by mouth once daily. 90 tablet 3     No current facility-administered medications on file prior to visit.          Psychiatric symptoms:  Depression:  Patient endorses symptoms of dysphoric mood, tearfulness, irritability and poor frustration tolerance however notes some  improvement in symptoms.  She denied suicidal ideation.  Kori/Hypomania:  Denied.  She denied periods of elevated mood or abnormally increased energy or goal-directed activity.  Anxiety:  Patient endorses symptoms of nervousness, restlessness, increased worry and feeling overwhelmed however notes some improvement in symptoms.  Thoughts:  Denied delusions, hallucinations.  Suicidal thoughts/behaviors: Patient denied suicidal and homicidal ideation, plan and intent.  Patient noted agreement to call 911/and or present to the ED if she experienced suicidal or homicidal ideation, plan or intent.    Self-injury:  Denied.  Sleep: Patient denies sleep difficulty.  Trauma:  Patient names the passing of her son as a major tragedy she experienced.  Patient also names recent event where his grave was tampered with as a major tragedy she experienced.  Patient denies nightmares, flashbacks or hyperarousal to surroundings.  Patient notes she does become overwhelmed and anxious when she thinks about her sons grave being tampered with and when she thinks about how overwhelmed she felt when she thought his grave had been moved.  Patient notes at this time she is feeling very overwhelmed that his picture was removed from his grave and has not been returned.     Mental Status Exam:  General appearance:  appears stated age, neatly dressed, well groomed  Speech:  Clear and intelligible, normal rate, normal tone, normal pitch, normal volume  Level of cooperation:  cooperative  Thought processes:  Linear, logical, goal-directed  Mood:  Generally euthymic  Thought content:  Relevant and appropriate, no illusions, no visual hallucinations, no auditory hallucinations, no delusions, no active or passive homicidal thoughts, no active or passive suicidal ideation, no obsessions, no compulsions, no violence  Affect:  Congruent and appropriate  Orientation:  oriented to person, place, situation and date  Memory/Attention/Concentration:  No gross  cognitive deficits made evident during conversation.  Judgment and insight: fair  Language:  Intact     PHQ9 1/13/2020   Total Score 12     GAD7 1/13/2020   GERARDO-7 Score 9        Impression: Patient presents today endorsing symptoms of anxiety and depression throughout the last 6 months.  Patient notes some improvement in symptoms of anxiety and depression since meeting with this provider last.     Plan:    1.  Patient provided psychoeducation on anxiety, depression and bereavement.  2.  Stress management discussed.  3.  Deep breathing and relaxation strategies discussed.  4. Increasing opportunities for socialization discussed.  5. Pacing and planning daily activities discussed.   6. Patient to follow-up PRN.     Length of Session:  44 minutes

## 2020-01-13 ENCOUNTER — OFFICE VISIT (OUTPATIENT)
Dept: PSYCHIATRY | Facility: CLINIC | Age: 75
End: 2020-01-13
Payer: MEDICARE

## 2020-01-13 DIAGNOSIS — F43.23 ADJUSTMENT DISORDER WITH MIXED ANXIETY AND DEPRESSED MOOD: Primary | ICD-10-CM

## 2020-01-13 PROCEDURE — 90832 PSYTX W PT 30 MINUTES: CPT | Mod: S$GLB,,, | Performed by: PSYCHOLOGIST

## 2020-01-13 PROCEDURE — 90832 PR PSYCHOTHERAPY W/PATIENT, 30 MIN: ICD-10-PCS | Mod: S$GLB,,, | Performed by: PSYCHOLOGIST

## 2020-01-23 ENCOUNTER — OFFICE VISIT (OUTPATIENT)
Dept: FAMILY MEDICINE | Facility: CLINIC | Age: 75
End: 2020-01-23
Payer: MEDICARE

## 2020-01-23 VITALS
HEIGHT: 62 IN | TEMPERATURE: 98 F | BODY MASS INDEX: 35.14 KG/M2 | OXYGEN SATURATION: 99 % | WEIGHT: 190.94 LBS | SYSTOLIC BLOOD PRESSURE: 134 MMHG | HEART RATE: 79 BPM | DIASTOLIC BLOOD PRESSURE: 74 MMHG

## 2020-01-23 DIAGNOSIS — R80.9 TYPE 2 DIABETES MELLITUS WITH MICROALBUMINURIA, WITHOUT LONG-TERM CURRENT USE OF INSULIN: Primary | ICD-10-CM

## 2020-01-23 DIAGNOSIS — I10 ESSENTIAL HYPERTENSION: ICD-10-CM

## 2020-01-23 DIAGNOSIS — Z12.31 OTHER SCREENING MAMMOGRAM: ICD-10-CM

## 2020-01-23 DIAGNOSIS — E78.49 OTHER HYPERLIPIDEMIA: ICD-10-CM

## 2020-01-23 DIAGNOSIS — E11.29 TYPE 2 DIABETES MELLITUS WITH MICROALBUMINURIA, WITHOUT LONG-TERM CURRENT USE OF INSULIN: Primary | ICD-10-CM

## 2020-01-23 PROCEDURE — 1159F PR MEDICATION LIST DOCUMENTED IN MEDICAL RECORD: ICD-10-PCS | Mod: S$GLB,,, | Performed by: INTERNAL MEDICINE

## 2020-01-23 PROCEDURE — 1101F PR PT FALLS ASSESS DOC 0-1 FALLS W/OUT INJ PAST YR: ICD-10-PCS | Mod: CPTII,S$GLB,, | Performed by: INTERNAL MEDICINE

## 2020-01-23 PROCEDURE — 3078F PR MOST RECENT DIASTOLIC BLOOD PRESSURE < 80 MM HG: ICD-10-PCS | Mod: CPTII,S$GLB,, | Performed by: INTERNAL MEDICINE

## 2020-01-23 PROCEDURE — 1101F PT FALLS ASSESS-DOCD LE1/YR: CPT | Mod: CPTII,S$GLB,, | Performed by: INTERNAL MEDICINE

## 2020-01-23 PROCEDURE — 99214 PR OFFICE/OUTPT VISIT, EST, LEVL IV, 30-39 MIN: ICD-10-PCS | Mod: S$GLB,,, | Performed by: INTERNAL MEDICINE

## 2020-01-23 PROCEDURE — 1126F PR PAIN SEVERITY QUANTIFIED, NO PAIN PRESENT: ICD-10-PCS | Mod: S$GLB,,, | Performed by: INTERNAL MEDICINE

## 2020-01-23 PROCEDURE — 99999 PR PBB SHADOW E&M-EST. PATIENT-LVL III: ICD-10-PCS | Mod: PBBFAC,,, | Performed by: INTERNAL MEDICINE

## 2020-01-23 PROCEDURE — 99214 OFFICE O/P EST MOD 30 MIN: CPT | Mod: S$GLB,,, | Performed by: INTERNAL MEDICINE

## 2020-01-23 PROCEDURE — 1159F MED LIST DOCD IN RCRD: CPT | Mod: S$GLB,,, | Performed by: INTERNAL MEDICINE

## 2020-01-23 PROCEDURE — 3075F PR MOST RECENT SYSTOLIC BLOOD PRESS GE 130-139MM HG: ICD-10-PCS | Mod: CPTII,S$GLB,, | Performed by: INTERNAL MEDICINE

## 2020-01-23 PROCEDURE — 1126F AMNT PAIN NOTED NONE PRSNT: CPT | Mod: S$GLB,,, | Performed by: INTERNAL MEDICINE

## 2020-01-23 PROCEDURE — 3078F DIAST BP <80 MM HG: CPT | Mod: CPTII,S$GLB,, | Performed by: INTERNAL MEDICINE

## 2020-01-23 PROCEDURE — 99999 PR PBB SHADOW E&M-EST. PATIENT-LVL III: CPT | Mod: PBBFAC,,, | Performed by: INTERNAL MEDICINE

## 2020-01-23 PROCEDURE — 3075F SYST BP GE 130 - 139MM HG: CPT | Mod: CPTII,S$GLB,, | Performed by: INTERNAL MEDICINE

## 2020-01-23 NOTE — PROGRESS NOTES
Subjective:       Patient ID: Shawanda Desir is a 74 y.o. female.    Chief Complaint: Follow-up (3 month)    Pt here for follow up. She has the following conditions    HTN- bp has been great at home. She is on norvasc and benicar  DM- pt did not start the metformin. She is on nothing right now. She is not checking blood sugar.   Hyperlipidemia- - on lipitor    Review of Systems   Constitutional: Negative for fatigue, fever and unexpected weight change.   HENT: Negative for congestion, postnasal drip, sinus pain and sore throat.    Eyes: Negative for visual disturbance.   Respiratory: Negative for cough, chest tightness, shortness of breath and wheezing.    Cardiovascular: Negative for chest pain, palpitations and leg swelling.   Gastrointestinal: Negative for abdominal pain, blood in stool, constipation, diarrhea, nausea and vomiting.   Endocrine: Negative for cold intolerance and heat intolerance.   Genitourinary: Negative for difficulty urinating, dysuria and frequency.   Musculoskeletal: Negative for back pain, joint swelling and myalgias.   Skin: Negative for rash.   Allergic/Immunologic: Negative for environmental allergies.   Neurological: Negative for dizziness, seizures, numbness and headaches.   Hematological: Does not bruise/bleed easily.   Psychiatric/Behavioral: Negative for agitation and sleep disturbance.       Objective:      Physical Exam   Constitutional: She is oriented to person, place, and time. She appears well-developed and well-nourished.   HENT:   Head: Normocephalic and atraumatic.   Mouth/Throat: Oropharynx is clear and moist.   Eyes: Pupils are equal, round, and reactive to light. Conjunctivae and EOM are normal.   Neck: Normal range of motion. Neck supple. No thyromegaly present.   Cardiovascular: Normal rate, regular rhythm, normal heart sounds and intact distal pulses. Exam reveals no gallop and no friction rub.   No murmur heard.  Pulmonary/Chest: Effort normal and breath sounds  normal. No respiratory distress. She has no wheezes. She has no rales.   Abdominal: Soft. Bowel sounds are normal. She exhibits no distension. There is no tenderness.   Musculoskeletal: Normal range of motion. She exhibits no edema.   Lymphadenopathy:     She has no cervical adenopathy.   Neurological: She is alert and oriented to person, place, and time. No cranial nerve deficit.   Skin: Skin is warm and dry.   Psychiatric: She has a normal mood and affect. Her behavior is normal.       Assessment:       1. Type 2 diabetes mellitus with microalbuminuria, without long-term current use of insulin    2. Essential hypertension    3. Other hyperlipidemia    4. Other screening mammogram        Plan:       DM- try metfomrin xr 500 once a day.  Check labs 3 months. utd eye exam  htn- stable on medication  Hyperlipidemia- on statin. Stable

## 2020-01-27 ENCOUNTER — PATIENT OUTREACH (OUTPATIENT)
Dept: OTHER | Facility: OTHER | Age: 75
End: 2020-01-27

## 2020-01-27 NOTE — PROGRESS NOTES
"Digital Medicine: Health  Follow-Up    Patient is doing good at this time and does not have any questions concerning BP readings, medication or lifestyle routine.  Patient has been making lifestyle adjustments, like "eating different and exercising," and notices a difference. Patient is pleased with her BP readings - agreed to f/u in another 4-6 weeks, depending on readings.         INTERVENTION(S)  encouragement/support    PLAN  patient verbalizes understanding and continue monitoring      There are no preventive care reminders to display for this patient.    Last 5 Patient Entered Readings                                      Current 30 Day Average: 138/73     Recent Readings 1/27/2020 1/27/2020 1/27/2020 1/27/2020 1/27/2020    SBP (mmHg) 140 151 151 144 149    DBP (mmHg) 68 71 76 68 72    Pulse 74 76 77 72 78            Diet Screening       Patient has been "eating differently."    Physical Activity Screening   When asked if exercising, patient responded: yesHer level of intensity when exercising is low.    Patient participates in the following activities: yard/housework and walking    "

## 2020-02-27 ENCOUNTER — NURSE TRIAGE (OUTPATIENT)
Dept: ADMINISTRATIVE | Facility: CLINIC | Age: 75
End: 2020-02-27

## 2020-02-27 NOTE — TELEPHONE ENCOUNTER
Pt c/o rash to bilateral lower arms x 3 weeks. Pt advised per protocol, pt verbalizes understanding, and appointment made tomorrow.     Reason for Disposition   Localized rash present > 7 days    Additional Information   Negative: Sounds like a life-threatening emergency to the triager   Negative: Fever and localized purple or blood-colored spots or dots that are not from injury or friction   Negative: Fever and localized rash is very painful   Negative: Patient sounds very sick or weak to the triager   Negative: Looks like a boil, infected sore, deep ulcer, or other infected rash (spreading redness, pus)   Negative: Painful rash with multiple small blisters grouped together (i.e., dermatomal distribution or 'band' or 'stripe')   Negative: Localized rash is very painful (no fever)   Negative: Localized purple or blood-colored spots or dots that are not from injury or friction (no fever)   Negative: Lyme disease suspected (e.g., bull's-eye rash or tick bite / exposure)   Negative: Patient wants to be seen   Negative: Tender bumps in armpits   Negative: Pimples (localized) and no improvement after using CARE ADVICE   Negative: SEVERE local itching persists after 2 days of steroid cream   Negative: Applying cream or ointment and it causes severe itch, burning, or pain    Protocols used: RASH OR REDNESS - [id number]

## 2020-02-29 ENCOUNTER — LAB VISIT (OUTPATIENT)
Dept: LAB | Facility: HOSPITAL | Age: 75
End: 2020-02-29
Attending: INTERNAL MEDICINE
Payer: MEDICARE

## 2020-02-29 DIAGNOSIS — R80.9 TYPE 2 DIABETES MELLITUS WITH MICROALBUMINURIA, WITHOUT LONG-TERM CURRENT USE OF INSULIN: ICD-10-CM

## 2020-02-29 DIAGNOSIS — E11.29 TYPE 2 DIABETES MELLITUS WITH MICROALBUMINURIA, WITHOUT LONG-TERM CURRENT USE OF INSULIN: ICD-10-CM

## 2020-02-29 LAB
ALBUMIN SERPL BCP-MCNC: 3.4 G/DL (ref 3.5–5.2)
ALP SERPL-CCNC: 215 U/L (ref 55–135)
ALT SERPL W/O P-5'-P-CCNC: 19 U/L (ref 10–44)
ANION GAP SERPL CALC-SCNC: 11 MMOL/L (ref 8–16)
AST SERPL-CCNC: 19 U/L (ref 10–40)
BILIRUB SERPL-MCNC: 0.4 MG/DL (ref 0.1–1)
BUN SERPL-MCNC: 11 MG/DL (ref 8–23)
CALCIUM SERPL-MCNC: 9.7 MG/DL (ref 8.7–10.5)
CHLORIDE SERPL-SCNC: 105 MMOL/L (ref 95–110)
CO2 SERPL-SCNC: 25 MMOL/L (ref 23–29)
CREAT SERPL-MCNC: 0.9 MG/DL (ref 0.5–1.4)
EST. GFR  (AFRICAN AMERICAN): >60 ML/MIN/1.73 M^2
EST. GFR  (NON AFRICAN AMERICAN): >60 ML/MIN/1.73 M^2
ESTIMATED AVG GLUCOSE: 194 MG/DL (ref 68–131)
GLUCOSE SERPL-MCNC: 164 MG/DL (ref 70–110)
HBA1C MFR BLD HPLC: 8.4 % (ref 4–5.6)
POTASSIUM SERPL-SCNC: 4.4 MMOL/L (ref 3.5–5.1)
PROT SERPL-MCNC: 7.5 G/DL (ref 6–8.4)
SODIUM SERPL-SCNC: 141 MMOL/L (ref 136–145)

## 2020-02-29 PROCEDURE — 36415 COLL VENOUS BLD VENIPUNCTURE: CPT | Mod: PO

## 2020-02-29 PROCEDURE — 80053 COMPREHEN METABOLIC PANEL: CPT

## 2020-02-29 PROCEDURE — 83036 HEMOGLOBIN GLYCOSYLATED A1C: CPT

## 2020-03-02 ENCOUNTER — HOSPITAL ENCOUNTER (OUTPATIENT)
Dept: RADIOLOGY | Facility: HOSPITAL | Age: 75
Discharge: HOME | End: 2020-03-02
Attending: INTERNAL MEDICINE
Payer: MEDICARE

## 2020-03-02 DIAGNOSIS — Z12.31 OTHER SCREENING MAMMOGRAM: ICD-10-CM

## 2020-03-02 PROCEDURE — 77063 BREAST TOMOSYNTHESIS BI: CPT | Mod: 26,,, | Performed by: RADIOLOGY

## 2020-03-02 PROCEDURE — 77067 SCR MAMMO BI INCL CAD: CPT | Mod: 26,,, | Performed by: RADIOLOGY

## 2020-03-02 PROCEDURE — 77067 MAMMO DIGITAL SCREENING BILAT WITH TOMOSYNTHESIS_CAD: ICD-10-PCS | Mod: 26,,, | Performed by: RADIOLOGY

## 2020-03-02 PROCEDURE — 77063 MAMMO DIGITAL SCREENING BILAT WITH TOMOSYNTHESIS_CAD: ICD-10-PCS | Mod: 26,,, | Performed by: RADIOLOGY

## 2020-03-02 PROCEDURE — 77067 SCR MAMMO BI INCL CAD: CPT | Mod: TC,PO

## 2020-03-03 ENCOUNTER — OFFICE VISIT (OUTPATIENT)
Dept: FAMILY MEDICINE | Facility: CLINIC | Age: 75
End: 2020-03-03
Payer: MEDICARE

## 2020-03-03 ENCOUNTER — PATIENT OUTREACH (OUTPATIENT)
Dept: OTHER | Facility: OTHER | Age: 75
End: 2020-03-03

## 2020-03-03 VITALS
HEIGHT: 62 IN | DIASTOLIC BLOOD PRESSURE: 82 MMHG | SYSTOLIC BLOOD PRESSURE: 130 MMHG | TEMPERATURE: 98 F | OXYGEN SATURATION: 99 % | HEART RATE: 79 BPM | WEIGHT: 192.44 LBS | BODY MASS INDEX: 35.41 KG/M2

## 2020-03-03 DIAGNOSIS — R23.9 SKIN CHANGE: Primary | ICD-10-CM

## 2020-03-03 PROCEDURE — 1101F PR PT FALLS ASSESS DOC 0-1 FALLS W/OUT INJ PAST YR: ICD-10-PCS | Mod: CPTII,S$GLB,, | Performed by: NURSE PRACTITIONER

## 2020-03-03 PROCEDURE — 1101F PT FALLS ASSESS-DOCD LE1/YR: CPT | Mod: CPTII,S$GLB,, | Performed by: NURSE PRACTITIONER

## 2020-03-03 PROCEDURE — 3079F PR MOST RECENT DIASTOLIC BLOOD PRESSURE 80-89 MM HG: ICD-10-PCS | Mod: CPTII,S$GLB,, | Performed by: NURSE PRACTITIONER

## 2020-03-03 PROCEDURE — 3075F PR MOST RECENT SYSTOLIC BLOOD PRESS GE 130-139MM HG: ICD-10-PCS | Mod: CPTII,S$GLB,, | Performed by: NURSE PRACTITIONER

## 2020-03-03 PROCEDURE — 3079F DIAST BP 80-89 MM HG: CPT | Mod: CPTII,S$GLB,, | Performed by: NURSE PRACTITIONER

## 2020-03-03 PROCEDURE — 1126F PR PAIN SEVERITY QUANTIFIED, NO PAIN PRESENT: ICD-10-PCS | Mod: S$GLB,,, | Performed by: NURSE PRACTITIONER

## 2020-03-03 PROCEDURE — 99999 PR PBB SHADOW E&M-EST. PATIENT-LVL IV: ICD-10-PCS | Mod: PBBFAC,,, | Performed by: NURSE PRACTITIONER

## 2020-03-03 PROCEDURE — 99213 OFFICE O/P EST LOW 20 MIN: CPT | Mod: S$GLB,,, | Performed by: NURSE PRACTITIONER

## 2020-03-03 PROCEDURE — 1159F PR MEDICATION LIST DOCUMENTED IN MEDICAL RECORD: ICD-10-PCS | Mod: S$GLB,,, | Performed by: NURSE PRACTITIONER

## 2020-03-03 PROCEDURE — 1159F MED LIST DOCD IN RCRD: CPT | Mod: S$GLB,,, | Performed by: NURSE PRACTITIONER

## 2020-03-03 PROCEDURE — 3075F SYST BP GE 130 - 139MM HG: CPT | Mod: CPTII,S$GLB,, | Performed by: NURSE PRACTITIONER

## 2020-03-03 PROCEDURE — 1126F AMNT PAIN NOTED NONE PRSNT: CPT | Mod: S$GLB,,, | Performed by: NURSE PRACTITIONER

## 2020-03-03 PROCEDURE — 99213 PR OFFICE/OUTPT VISIT, EST, LEVL III, 20-29 MIN: ICD-10-PCS | Mod: S$GLB,,, | Performed by: NURSE PRACTITIONER

## 2020-03-03 PROCEDURE — 99999 PR PBB SHADOW E&M-EST. PATIENT-LVL IV: CPT | Mod: PBBFAC,,, | Performed by: NURSE PRACTITIONER

## 2020-03-03 RX ORDER — TRIAMCINOLONE ACETONIDE 5 MG/G
CREAM TOPICAL 2 TIMES DAILY
Qty: 15 G | Refills: 0 | Status: SHIPPED | OUTPATIENT
Start: 2020-03-03 | End: 2020-03-10 | Stop reason: SDUPTHER

## 2020-03-03 NOTE — PROGRESS NOTES
"Patient was busy getting dressed and requested a call back at a better time. Patient prefers phone calls in the later afternoon around 2-3PM.   Patient confirms she has been feeling good and is "getting back on track". Her phone was recently broken but she confirms she was able to get a new one and has been able to start submitting BP readings again.   Patient confirms she is still walking but requested call back tomorrow to further discuss habits and goals.  "

## 2020-03-03 NOTE — PROGRESS NOTES
Subjective:       Patient ID: Shawanda Desir is a 74 y.o. female.    Chief Complaint: Rash (her sister had same thing recommends triamcinolone cream 0.1% )    HPI   Mrs. Desir presents today for rash to both arms--started a few weeks ago. Without pruritus. Skin has darkened in color. She denies sun exposure. She reports sister had the same symptoms--cleared with triamcinolone cream. She does not moisturize. Denies change in topical products.   Vitals:    03/03/20 1059   BP: 130/82   Pulse: 79   Temp: 98.4 °F (36.9 °C)     Review of Systems   Constitutional: Negative for diaphoresis.   HENT: Negative for facial swelling and trouble swallowing.    Eyes: Negative for discharge and redness.   Respiratory: Negative for cough.    Cardiovascular: Negative for chest pain and palpitations.   Gastrointestinal: Negative for abdominal pain.   Genitourinary: Negative for difficulty urinating.   Musculoskeletal: Negative for gait problem.   Skin: Positive for color change. Negative for pallor.   Neurological: Negative for facial asymmetry and speech difficulty.   Psychiatric/Behavioral: Negative for confusion. The patient is not nervous/anxious.        Past Medical History:   Diagnosis Date    Diabetes mellitus     Hypertension      Objective:      Physical Exam   Constitutional: She is oriented to person, place, and time. No distress.   HENT:   Head: Normocephalic.   Right Ear: Hearing normal.   Left Ear: Hearing normal.   Nose: Nose normal.   Eyes: Conjunctivae and lids are normal.   Neck: No JVD present. No tracheal deviation present.   Cardiovascular: Normal rate and normal heart sounds.   Pulmonary/Chest: Effort normal and breath sounds normal.   Abdominal: She exhibits no distension.   Musculoskeletal: She exhibits no deformity.   Neurological: She is alert and oriented to person, place, and time.   Skin: She is not diaphoretic. No pallor.   Hyperpigmentation and dry skin to arms  Without erythema or rash    Psychiatric:  She has a normal mood and affect. Her speech is normal and behavior is normal. Judgment and thought content normal. Cognition and memory are normal.   Nursing note and vitals reviewed.      Assessment:       1. Skin change        Plan:       Skin change  -     Ambulatory referral/consult to Dermatology; Future; Expected date: 03/10/2020    Other orders  -     triamcinolone acetonide 0.5% (KENALOG) 0.5 % Crea; Apply topically 2 (two) times daily. Use for no more than 2 weeks  Dispense: 15 g; Refill: 0    encouraged patient to moisturize  If symptoms persist schedule with derm             Medication List with Changes/Refills   New Medications    TRIAMCINOLONE ACETONIDE 0.5% (KENALOG) 0.5 % CREA    Apply topically 2 (two) times daily. Use for no more than 2 weeks   Current Medications    ALLOPURINOL (ZYLOPRIM) 100 MG TABLET    Take 1 tablet (100 mg total) by mouth once daily.    AMLODIPINE (NORVASC) 10 MG TABLET    Take 1 tablet (10 mg total) by mouth once daily.    ASPIRIN (ECOTRIN) 81 MG EC TABLET    Take 81 mg by mouth once daily.    ATORVASTATIN (LIPITOR) 10 MG TABLET    TAKE 1 TABLET BY MOUTH EVERY DAY IN THE EVENING    METFORMIN (GLUCOPHAGE-XR) 500 MG 24 HR TABLET    Take 1 tablet (500 mg total) by mouth daily with breakfast.    OLMESARTAN (BENICAR) 40 MG TABLET    Take 1 tablet (40 mg total) by mouth once daily.

## 2020-03-05 NOTE — PROGRESS NOTES
Digital Medicine: Health  Follow-Up    Patient is doing good at this time and does not have any questions concerning BP readings, medication or lifestyle routine.  She ttributes higher BP reading to not waiting long enough before taking reading - reminded patient how important it is to take resting reading and she confirms understanding.    The history is provided by the patient.     Follow Up  Follow-up reason(s): reading review      Routine Education Topics: physical activity      INTERVENTION(S)  recommend physical activity, reviewed monitoring technique, encouragement/support, denied further coaching, denied support and denied questions    PLAN  patient verbalizes understanding and continue monitoring    There are no preventive care reminders to display for this patient.    Last 5 Patient Entered Readings                                      Current 30 Day Average: 146/72     Recent Readings 3/5/2020 3/5/2020 3/5/2020 3/5/2020 3/5/2020    SBP (mmHg) 154 148 151 157 149    DBP (mmHg) 72 73 71 75 77    Pulse 78 79 78 77 78            Diet Screening   No change to diet.  She has the following dietary restrictions: low sodium diet    Physical Activity Screening   When asked if exercising, patient responded: yesHer level of intensity when exercising is moderate.    Patient participates in the following activities: walking    Patient confirms she has been walking.

## 2020-03-05 NOTE — PROGRESS NOTES
"Digital Medicine: Clinician Follow-Up    Called patient to follow up. Patient endorses adherence to medication regimen. Patient denies hypotensive s/sx (lightheadedness, dizziness, nausea, fatigue); patient denies hypertensive s/sx (SOB, CP, severe headaches, changes in vision). Instructed patient to seek medical care if BP > 180/110 and is accompanied by hypertensive s/sx associated, patient confirms understanding.     She reports that she has not charged her BP cuff "in a while". She states that she is unsure why her readings are elevated at home since her BP was 130/82 mmHg at her appointment on Tuesday.    She reports that she has been getting around very well. She reports that she went off her diet on Tuesday after her appointment however she typically adheres to her diet. She states that she plans to take fruit and healthy snacks with her whenever she has to go run errands instead of subjecting to eating fast food.     She reports that she has been placing her blood pressure cuff on the wrong part of her arm.     The history is provided by the patient. No  was used.     Follow Up  Follow-up reason(s): reading review and routine education      Readings are trending up due to inaccurate technique and patient rushes to check her BP.    Assessment:  Reviewed recent readings. Per 2017 ACC/ AHA HTN guidelines (goal of BP < 130/80), current 30-day average need to be addressed more throroughly today.     Last 5 Patient Entered Readings                                      Current 30 Day Average: 146/72     Recent Readings 3/5/2020 3/5/2020 3/5/2020 3/5/2020 3/5/2020    SBP (mmHg) 154 148 151 157 149    DBP (mmHg) 72 73 71 75 77    Pulse 78 79 78 77 78        INTERVENTION(S)  reviewed appropriate dose schedule, reviewed monitoring technique, encouragement/support and goal setting    PLAN  patient verbalizes understanding and continue monitoring    >Continue current medication regimen.   >Reviewed " proper BP measurement technique and encouraged patient to place the cuff about 2 inches above the bend of her elbow.  >Encouraged patient to charge the base of her BP cuff this evening before she resumes checking her BP.  >I will continue to monitor regularly and will follow-up in 2 to 3 weeks, sooner if blood pressure begins to trend upward or downward.   >Patient denies having questions or concerns. Patient has my contact information and knows to call with any concerns or clinical changes.      There are no preventive care reminders to display for this patient.    Hypertension Medications             amLODIPine (NORVASC) 10 MG tablet Take 1 tablet (10 mg total) by mouth once daily.    olmesartan (BENICAR) 40 MG tablet Take 1 tablet (40 mg total) by mouth once daily.

## 2020-03-10 RX ORDER — TRIAMCINOLONE ACETONIDE 5 MG/G
CREAM TOPICAL 2 TIMES DAILY
Qty: 15 G | Refills: 0 | Status: SHIPPED | OUTPATIENT
Start: 2020-03-10 | End: 2021-11-16

## 2020-03-10 RX ORDER — TRIAMCINOLONE ACETONIDE 5 MG/G
CREAM TOPICAL 2 TIMES DAILY
Qty: 15 G | Refills: 0 | OUTPATIENT
Start: 2020-03-10

## 2020-03-10 NOTE — TELEPHONE ENCOUNTER
----- Message from Shruthi Navarro sent at 3/10/2020  9:17 AM CDT -----  Contact: Patient Refill  Type:  RX Refill Request    Who Called:  Patient  Refill or New Rx:  refill  RX Name and Strength:  Ania  How is the patient currently taking it? (ex. 1XDay):  Twice daily   Is this a 30 day or 90 day RX:  30  Preferred Pharmacy with phone number:    [id number] [place] - [address]  [address]  [place] [address]  Phone: [phone] Fax: [phone]  Local or Mail Order:  Local  Ordering Provider:  Ania  Best Call Back Number:  [phone]  Additional Information:  Patient stated the prescribed cream was not enough due to rash is all over both arms. She completely.

## 2020-03-23 ENCOUNTER — PATIENT OUTREACH (OUTPATIENT)
Dept: OTHER | Facility: OTHER | Age: 75
End: 2020-03-23

## 2020-03-23 NOTE — PROGRESS NOTES
Digital Medicine: Clinician Follow-Up    Called patient to follow up. Patient endorses adherence to medication regimen. Patient denies hypotensive s/sx (lightheadedness, dizziness, nausea, fatigue); patient denies hypertensive s/sx (SOB, CP, severe headaches, changes in vision). Instructed patient to seek medical care if BP > 180/110 and is accompanied by hypertensive s/sx associated, patient confirms understanding.     She reports that she has a cold which is causing her BP to be elevated. She reports that when she checked her BP this morning it was right after having coffee.     She reports that for her cold, she took Nyquil last night. She also uses a home remedy - lemon water and honey.       The history is provided by the patient.     Follow Up  Follow-up reason(s): reading review and routine education      Readings are trending up     Assessment:  Reviewed recent readings. Per 2017 ACC/ AHA HTN guidelines (goal of BP < 130/80), current 30-day average need to be addressed more throroughly today.     Last 5 Patient Entered Readings                                      Current 30 Day Average: 148/73     Recent Readings 3/23/2020 3/23/2020 3/13/2020 3/13/2020 3/13/2020    SBP (mmHg) 157 158 141 143 145    DBP (mmHg) 78 79 66 75 70    Pulse 84 87 77 79 80        INTERVENTION(S)  reviewed monitoring technique, encouragement/support and goal setting    PLAN  patient verbalizes understanding and continue monitoring    >Continue current medication regimen.   >Informed patient to get OTC Coricidin HBP for her cold symptoms -- which will not elevated her BP.  >Encouraged patient to charge the base of her BP cuff today prior to resuming to check her readings.  >I will continue to monitor regularly and will follow-up in ~4 weeks, sooner if blood pressure begins to trend upward or downward.   >Patient denies having questions or concerns. Patient has my contact information and knows to call with any concerns or clinical  changes.    There are no preventive care reminders to display for this patient.    Hypertension Medications             amLODIPine (NORVASC) 10 MG tablet Take 1 tablet (10 mg total) by mouth once daily.    olmesartan (BENICAR) 40 MG tablet Take 1 tablet (40 mg total) by mouth once daily.

## 2020-04-01 ENCOUNTER — TELEPHONE (OUTPATIENT)
Dept: FAMILY MEDICINE | Facility: CLINIC | Age: 75
End: 2020-04-01

## 2020-04-01 NOTE — TELEPHONE ENCOUNTER
----- Message from Shruthi Ramon sent at 4/1/2020  4:49 PM CDT -----  Contact: Sakina with [employer]  Type:  Sooner Apoointment Request    Caller is requesting a sooner appointment.  Caller declined first available appointment listed below.  Caller will not accept being placed on the waitlist and is requesting a message be sent to doctor.    Name of Caller:  Pt  When is the first available appointment?  #  Symptoms:  Patient tested positive for COVID 19 need chest X ray  Best Call Back Number:  [phone]  Additional Information:  Sakina called to schedule patient 1 week follow up appt. Patient d/c from [facility] d/c 3/31.

## 2020-04-03 ENCOUNTER — TELEPHONE (OUTPATIENT)
Dept: FAMILY MEDICINE | Facility: CLINIC | Age: 75
End: 2020-04-03

## 2020-04-03 NOTE — TELEPHONE ENCOUNTER
Attempted to contact pt to schedule hospital follow up visit. Left message on VM for pt to return call to clinic.

## 2020-04-03 NOTE — TELEPHONE ENCOUNTER
----- Message from Tiffany Babin sent at 4/3/2020 11:48 AM CDT -----  Contact: Patient  Type:  Patient Returning Call  Who Called:  patient  Who Left Message for Patient:  rolly  Does the patient know what this is regarding?:  no  Best Call Back Number:  [phone]  Additional Information:  Sent message to the pod.  Thanks!

## 2020-04-07 ENCOUNTER — PATIENT OUTREACH (OUTPATIENT)
Dept: ADMINISTRATIVE | Facility: HOSPITAL | Age: 75
End: 2020-04-07

## 2020-04-07 DIAGNOSIS — Z12.11 SCREENING FOR COLON CANCER: Primary | ICD-10-CM

## 2020-04-07 NOTE — PROGRESS NOTES
Chart review completed 04/07/2020.  Care Everywhere updates requested and reviewed.  Immunizations reconciled. Media reviewed.     WOG placed for FIT KIT.    Health Maintenance Due   Topic Date Due    TETANUS VACCINE  09/[birth year]/1962    Shingles Vaccine (1 of 2) 09/23/1994    Influenza Vaccine (1) 09/01/2019    Pneumococcal Vaccine (65+ Low/Medium Risk) (2 of 2 - PPSV23) 11/08/2019    Foot Exam  02/08/2020    Fecal Occult Blood Test (FOBT)/[employer]Kit  02/15/2020

## 2020-04-13 ENCOUNTER — PATIENT OUTREACH (OUTPATIENT)
Dept: OTHER | Facility: OTHER | Age: 75
End: 2020-04-13

## 2020-04-13 ENCOUNTER — TELEPHONE (OUTPATIENT)
Dept: FAMILY MEDICINE | Facility: CLINIC | Age: 75
End: 2020-04-13

## 2020-04-13 ENCOUNTER — OFFICE VISIT (OUTPATIENT)
Dept: FAMILY MEDICINE | Facility: CLINIC | Age: 75
End: 2020-04-13
Payer: MEDICARE

## 2020-04-13 DIAGNOSIS — E66.01 MORBID (SEVERE) OBESITY DUE TO EXCESS CALORIES: ICD-10-CM

## 2020-04-13 DIAGNOSIS — R05.9 COUGH: Primary | ICD-10-CM

## 2020-04-13 DIAGNOSIS — R80.9 TYPE 2 DIABETES MELLITUS WITH MICROALBUMINURIA, WITHOUT LONG-TERM CURRENT USE OF INSULIN: ICD-10-CM

## 2020-04-13 DIAGNOSIS — E11.29 TYPE 2 DIABETES MELLITUS WITH MICROALBUMINURIA, WITHOUT LONG-TERM CURRENT USE OF INSULIN: ICD-10-CM

## 2020-04-13 PROCEDURE — 99499 UNLISTED E&M SERVICE: CPT | Mod: 95,,, | Performed by: NURSE PRACTITIONER

## 2020-04-13 PROCEDURE — 99442 PR PHYSICIAN TELEPHONE EVALUATION 11-20 MIN: ICD-10-PCS | Mod: 95,,, | Performed by: NURSE PRACTITIONER

## 2020-04-13 PROCEDURE — 99442 PR PHYSICIAN TELEPHONE EVALUATION 11-20 MIN: CPT | Mod: 95,,, | Performed by: NURSE PRACTITIONER

## 2020-04-13 PROCEDURE — 99499 RISK ADDL DX/OHS AUDIT: ICD-10-PCS | Mod: 95,,, | Performed by: NURSE PRACTITIONER

## 2020-04-13 RX ORDER — DOXYCYCLINE 100 MG/1
100 CAPSULE ORAL 2 TIMES DAILY
Qty: 20 CAPSULE | Refills: 0 | Status: SHIPPED | OUTPATIENT
Start: 2020-04-13 | End: 2020-04-23

## 2020-04-13 NOTE — PROGRESS NOTES
Digital Medicine: Clinician Follow-Up    Called patient to follow up. Patient endorses adherence to medication regimen. Patient denies hypotensive s/sx (lightheadedness, dizziness, nausea, fatigue); patient denies hypertensive s/sx (SOB, CP, severe headaches, changes in vision). She reports that she was in the hospital with pneumonia for the past 2 weeks and now has a cold. She reports that last night, she could not sleep due to her symptoms. She called her nurse practitioner for a prescription and was prescribed a cold medication.     The history is provided by the patient. No  was used.     Follow Up  Follow-up reason(s): routine education      Assessment:  Reviewed recent readings. Per 2017 ACC/ AHA HTN guidelines (goal of BP < 130/80), current 30-day average need to be addressed more throroughly today.    Last 5 Patient Entered Readings                                      Current 30 Day Average: 152/77     Recent Readings 4/8/2020 4/8/2020 3/23/2020 3/23/2020 3/13/2020    SBP (mmHg) 147 151 157 158 141    DBP (mmHg) 77 75 78 79 66    Pulse 84 85 84 87 77        INTERVENTION(S)  reviewed appropriate dose schedule, reviewed monitoring technique, encouragement/support and goal setting    PLAN  patient verbalizes understanding and continue monitoring    >Continue current medication regimen.   >In future, may discontinue amlodipine and initiate nifedipine 60 mg.   >I will continue to monitor regularly and will follow-up in 2 to 3 weeks, sooner if blood pressure begins to trend upward or downward.  >Patient denies having questions or concerns. Patient has my contact information and knows to call with any concerns or clinical changes.        There are no preventive care reminders to display for this patient.    Hypertension Medications             amLODIPine (NORVASC) 10 MG tablet Take 1 tablet (10 mg total) by mouth once daily.    olmesartan (BENICAR) 40 MG tablet Take 1 tablet (40 mg total) by  mouth once daily.

## 2020-04-13 NOTE — PROGRESS NOTES
The patient location is: [place], [place]  The chief complaint leading to consultation is: cough  Visit type: Virtual visit with synchronous audio and video  Total time spent with patient: approx 12 min  Each patient to whom he or she provides medical services by telemedicine is:  (1) informed of the relationship between the physician and patient and the respective role of any other health care provider with respect to management of the patient; and (2) notified that he or she may decline to receive medical services by telemedicine and may withdraw from such care at any time.    ER for 2 weeks ago for cold symptoms. Admitted into [facility] for 2 days. Tested for coronaviruses but has not received results. She states she has had a cough ever since then. No fever. No shortness of breath. No sinus congestion, or sore throat. No abdominal pain or N/V/D    This is an audio visit. The patient is new to me but was very pleasant.  Does not appear to be in any distress.     Diagnoses and all orders for this visit:    Cough  -     doxycycline (VIBRAMYCIN) 100 MG Cap; Take 1 capsule (100 mg total) by mouth 2 (two) times daily. for 10 days    Morbid (severe) obesity due to excess calories  -     doxycycline (VIBRAMYCIN) 100 MG Cap; Take 1 capsule (100 mg total) by mouth 2 (two) times daily. for 10 days    Type 2 diabetes mellitus with microalbuminuria, without long-term current use of insulin  -     doxycycline (VIBRAMYCIN) 100 MG Cap; Take 1 capsule (100 mg total) by mouth 2 (two) times daily. for 10 days    Mucinex- use as directed. Rest and increase fluids  Records requested for [facility] admission  ER for increasing or worsening symptoms. Pt verbalized an understanding    ADDENDUM:  Reviewed lab test sent from [facility]. Covid-19 detected  Pt notfied

## 2020-04-13 NOTE — TELEPHONE ENCOUNTER
----- Message from Km ROUSE Niecy sent at 4/13/2020 10:37 AM CDT -----  Contact: same  Patient called in and stated she needs something called in for her cold.  Patient stated she has had this cold for over 2 weeks.        [id number] [place] - [address]  [address]  [place] [address]  Phone: [phone] Fax: [phone]    Patient call back number is [phone]

## 2020-04-16 ENCOUNTER — TELEPHONE (OUTPATIENT)
Dept: FAMILY MEDICINE | Facility: CLINIC | Age: 75
End: 2020-04-16

## 2020-04-16 ENCOUNTER — OFFICE VISIT (OUTPATIENT)
Dept: FAMILY MEDICINE | Facility: CLINIC | Age: 75
End: 2020-04-16
Payer: MEDICARE

## 2020-04-16 DIAGNOSIS — R80.9 TYPE 2 DIABETES MELLITUS WITH MICROALBUMINURIA, WITHOUT LONG-TERM CURRENT USE OF INSULIN: ICD-10-CM

## 2020-04-16 DIAGNOSIS — U07.1 LOWER RESPIRATORY TRACT INFECTION DUE TO COVID-19 VIRUS: Primary | ICD-10-CM

## 2020-04-16 DIAGNOSIS — E11.29 TYPE 2 DIABETES MELLITUS WITH MICROALBUMINURIA, WITHOUT LONG-TERM CURRENT USE OF INSULIN: ICD-10-CM

## 2020-04-16 DIAGNOSIS — J22 LOWER RESPIRATORY TRACT INFECTION DUE TO COVID-19 VIRUS: Primary | ICD-10-CM

## 2020-04-16 PROCEDURE — 99442 PR PHYSICIAN TELEPHONE EVALUATION 11-20 MIN: CPT | Mod: 95,,, | Performed by: NURSE PRACTITIONER

## 2020-04-16 PROCEDURE — 99442 PR PHYSICIAN TELEPHONE EVALUATION 11-20 MIN: ICD-10-PCS | Mod: 95,,, | Performed by: NURSE PRACTITIONER

## 2020-04-16 RX ORDER — AMOXICILLIN AND CLAVULANATE POTASSIUM 875; 125 MG/1; MG/1
1 TABLET, FILM COATED ORAL 2 TIMES DAILY
Qty: 20 TABLET | Refills: 0 | Status: SHIPPED | OUTPATIENT
Start: 2020-04-16 | End: 2020-04-26

## 2020-04-16 RX ORDER — BENZONATATE 100 MG/1
100 CAPSULE ORAL 3 TIMES DAILY PRN
Qty: 20 CAPSULE | Refills: 0 | Status: SHIPPED | OUTPATIENT
Start: 2020-04-16 | End: 2020-04-26

## 2020-04-16 NOTE — PROGRESS NOTES
Subjective:       Patient ID: Shawanda Desir is a 74 y.o. female.    Chief Complaint: No chief complaint on file.    The patient location is: [place], [place]  The chief complaint leading to consultation is: cough  Visit type: Virtual visit with synchronous audio and video  Total time spent with patient: approx 11 min  Each patient to whom he or she provides medical services by telemedicine is:  (1) informed of the relationship between the physician and patient and the respective role of any other health care provider with respect to management of the patient; and (2) notified that he or she may decline to receive medical services by telemedicine and may withdraw from such care at any time.  The patient, who is new to me presents for cough and sinus symptoms. She was seen on Monday for a similar issue and given doxycyline. She states she feels that symptoms have not improved with doxycycline and would like it switched to augmentin. She denies any fever, shortness of breath, N/V/D, abdominal pain or sinusitis. She did test positive for COVID and was admitted to [facility] for 2 days. She states she was discharge and diagnosed with pneumonia and never given any antibiotics to go home with. She states he blood glucose has been running between 75-90 in the AM.     Review of Systems   Constitutional: Negative for chills, fatigue and fever.   HENT: Positive for congestion. Negative for sinus pressure, sinus pain, sneezing and sore throat.    Respiratory: Positive for cough. Negative for chest tightness, shortness of breath and wheezing.    Cardiovascular: Negative for chest pain, palpitations and leg swelling.   Gastrointestinal: Negative for abdominal distention, abdominal pain, constipation, diarrhea, nausea and vomiting.   Genitourinary: Negative for decreased urine volume, difficulty urinating, dysuria, frequency and urgency.   Musculoskeletal: Positive for arthralgias (shoulder pain). Negative for gait problem,  joint swelling and myalgias.   Skin: Negative for rash and wound.   Neurological: Negative for dizziness, light-headedness, numbness and headaches.       Objective:      Physical Exam   Constitutional: She is oriented to person, place, and time.   Pulmonary/Chest:   Patient able to speak in full sentences.    Neurological: She is alert and oriented to person, place, and time.   Psychiatric: She has a normal mood and affect. Thought content normal.       Assessment:       1. Lower respiratory tract infection due to COVID-19 virus    2. Type 2 diabetes mellitus with microalbuminuria, without long-term current use of insulin        Plan:       Diagnoses and all orders for this visit:    Lower respiratory tract infection due to COVID-19 virus  -     amoxicillin-clavulanate 875-125mg (AUGMENTIN) 875-125 mg per tablet; Take 1 tablet by mouth 2 (two) times daily. for 10 days  -     X-Ray Chest PA And Lateral; Future  -     benzonatate (TESSALON) 100 MG capsule; Take 1 capsule (100 mg total) by mouth 3 (three) times daily as needed.    Type 2 diabetes mellitus with microalbuminuria, without long-term current use of insulin  Monitor blood glucose and discussed heart healthy diet.     Will follow up on the chest xray. Will switch to augmentin, if pneumonia still present will add a zpack. Follow up with PCP.   Discussed worsening signs/symptoms and when to return to clinic or go to ED.   Patient expresses understanding and agrees with treatment plan.

## 2020-04-16 NOTE — TELEPHONE ENCOUNTER
Called pt, she had a virtual visit 4/13/20 with Brittany Zabala NP and she stated the medication is not working. She is still having the chest congestion. She is wanting to see about getting rx switched to Amoxacillin. Please advise.

## 2020-04-16 NOTE — TELEPHONE ENCOUNTER
----- Message from Dora Ngo sent at 4/16/2020 11:12 AM CDT -----   Type:  RX Refill Request    Who Called: pt   New Rx:   RX Name and Strength: amoxacillin   Preferred Pharmacy with phone number:    [id number] [place] - [address]  [address]  [place] [address]  Phone: [phone] Fax: [phone]  Best Call Back Number: [phone]  Additional Information:      Pt   Has    Chest  Congestion // no fever

## 2020-04-17 ENCOUNTER — HOSPITAL ENCOUNTER (OUTPATIENT)
Dept: RADIOLOGY | Facility: HOSPITAL | Age: 75
Discharge: HOME | End: 2020-04-17
Attending: NURSE PRACTITIONER
Payer: MEDICARE

## 2020-04-17 DIAGNOSIS — U07.1 LOWER RESPIRATORY TRACT INFECTION DUE TO COVID-19 VIRUS: ICD-10-CM

## 2020-04-17 DIAGNOSIS — J22 LOWER RESPIRATORY TRACT INFECTION DUE TO COVID-19 VIRUS: ICD-10-CM

## 2020-04-17 PROCEDURE — 71046 X-RAY EXAM CHEST 2 VIEWS: CPT | Mod: 26,,, | Performed by: RADIOLOGY

## 2020-04-17 PROCEDURE — 71046 XR CHEST PA AND LATERAL: ICD-10-PCS | Mod: 26,,, | Performed by: RADIOLOGY

## 2020-04-17 PROCEDURE — 71046 X-RAY EXAM CHEST 2 VIEWS: CPT | Mod: TC,FY,PO

## 2020-04-21 ENCOUNTER — TELEPHONE (OUTPATIENT)
Dept: FAMILY MEDICINE | Facility: CLINIC | Age: 75
End: 2020-04-21

## 2020-04-21 NOTE — TELEPHONE ENCOUNTER
----- Message from Ann Juárez sent at 4/21/2020  8:42 AM CDT -----  Contact: Shawanda madden  Type:  Test Results    Who Called:  Shawanda  Name of Test (Lab/Mammo/Etc):  xray  Date of Test:  Last friday  Ordering Provider:  Sedrick  Where the test was performed:  n/a  Best Call Back Number:  [phone]  Additional Information:  Pls call pt regarding her results

## 2020-04-30 NOTE — TELEPHONE ENCOUNTER
Patient states last time she went to get her blood drawn they couldn't get it. Want to get blood work before she sees you on 8/8. I have signed new lab orders. Please advise   [facility] did dc patient today. She has met goals and has maintained an acuity 1.  Kieran

## 2020-05-05 ENCOUNTER — PATIENT MESSAGE (OUTPATIENT)
Dept: ADMINISTRATIVE | Facility: HOSPITAL | Age: 75
End: 2020-05-05

## 2020-05-05 ENCOUNTER — PATIENT OUTREACH (OUTPATIENT)
Dept: OTHER | Facility: OTHER | Age: 75
End: 2020-05-05

## 2020-05-11 RX ORDER — TRIAMCINOLONE ACETONIDE 5 MG/G
CREAM TOPICAL 2 TIMES DAILY
Qty: 15 G | Refills: 0 | OUTPATIENT
Start: 2020-05-11

## 2020-05-20 ENCOUNTER — LAB VISIT (OUTPATIENT)
Dept: LAB | Facility: HOSPITAL | Age: 75
End: 2020-05-20
Attending: INTERNAL MEDICINE
Payer: MEDICARE

## 2020-05-20 ENCOUNTER — OFFICE VISIT (OUTPATIENT)
Dept: FAMILY MEDICINE | Facility: CLINIC | Age: 75
End: 2020-05-20
Payer: MEDICARE

## 2020-05-20 VITALS
WEIGHT: 188.25 LBS | DIASTOLIC BLOOD PRESSURE: 82 MMHG | HEIGHT: 62 IN | BODY MASS INDEX: 34.64 KG/M2 | OXYGEN SATURATION: 98 % | SYSTOLIC BLOOD PRESSURE: 120 MMHG | HEART RATE: 100 BPM

## 2020-05-20 DIAGNOSIS — E11.69 TYPE 2 DIABETES MELLITUS WITH OTHER SPECIFIED COMPLICATION, WITHOUT LONG-TERM CURRENT USE OF INSULIN: ICD-10-CM

## 2020-05-20 DIAGNOSIS — E11.69 TYPE 2 DIABETES MELLITUS WITH OTHER SPECIFIED COMPLICATION, WITHOUT LONG-TERM CURRENT USE OF INSULIN: Primary | ICD-10-CM

## 2020-05-20 DIAGNOSIS — E78.49 OTHER HYPERLIPIDEMIA: ICD-10-CM

## 2020-05-20 DIAGNOSIS — I10 ESSENTIAL HYPERTENSION: ICD-10-CM

## 2020-05-20 DIAGNOSIS — E11.29 TYPE 2 DIABETES MELLITUS WITH MICROALBUMINURIA, WITHOUT LONG-TERM CURRENT USE OF INSULIN: ICD-10-CM

## 2020-05-20 DIAGNOSIS — R80.9 TYPE 2 DIABETES MELLITUS WITH MICROALBUMINURIA, WITHOUT LONG-TERM CURRENT USE OF INSULIN: ICD-10-CM

## 2020-05-20 PROCEDURE — 3074F SYST BP LT 130 MM HG: CPT | Mod: CPTII,S$GLB,, | Performed by: INTERNAL MEDICINE

## 2020-05-20 PROCEDURE — 99999 PR PBB SHADOW E&M-EST. PATIENT-LVL III: CPT | Mod: PBBFAC,,, | Performed by: INTERNAL MEDICINE

## 2020-05-20 PROCEDURE — 36415 COLL VENOUS BLD VENIPUNCTURE: CPT | Mod: PO

## 2020-05-20 PROCEDURE — 1159F PR MEDICATION LIST DOCUMENTED IN MEDICAL RECORD: ICD-10-PCS | Mod: S$GLB,,, | Performed by: INTERNAL MEDICINE

## 2020-05-20 PROCEDURE — 1159F MED LIST DOCD IN RCRD: CPT | Mod: S$GLB,,, | Performed by: INTERNAL MEDICINE

## 2020-05-20 PROCEDURE — 1101F PT FALLS ASSESS-DOCD LE1/YR: CPT | Mod: CPTII,S$GLB,, | Performed by: INTERNAL MEDICINE

## 2020-05-20 PROCEDURE — 3079F PR MOST RECENT DIASTOLIC BLOOD PRESSURE 80-89 MM HG: ICD-10-PCS | Mod: CPTII,S$GLB,, | Performed by: INTERNAL MEDICINE

## 2020-05-20 PROCEDURE — 80053 COMPREHEN METABOLIC PANEL: CPT

## 2020-05-20 PROCEDURE — 99213 OFFICE O/P EST LOW 20 MIN: CPT | Mod: S$GLB,,, | Performed by: INTERNAL MEDICINE

## 2020-05-20 PROCEDURE — 99999 PR PBB SHADOW E&M-EST. PATIENT-LVL III: ICD-10-PCS | Mod: PBBFAC,,, | Performed by: INTERNAL MEDICINE

## 2020-05-20 PROCEDURE — 3079F DIAST BP 80-89 MM HG: CPT | Mod: CPTII,S$GLB,, | Performed by: INTERNAL MEDICINE

## 2020-05-20 PROCEDURE — 3052F HG A1C>EQUAL 8.0%<EQUAL 9.0%: CPT | Mod: CPTII,S$GLB,, | Performed by: INTERNAL MEDICINE

## 2020-05-20 PROCEDURE — 1101F PR PT FALLS ASSESS DOC 0-1 FALLS W/OUT INJ PAST YR: ICD-10-PCS | Mod: CPTII,S$GLB,, | Performed by: INTERNAL MEDICINE

## 2020-05-20 PROCEDURE — 3052F PR MOST RECENT HEMOGLOBIN A1C LEVEL 8.0 - < 9.0%: ICD-10-PCS | Mod: CPTII,S$GLB,, | Performed by: INTERNAL MEDICINE

## 2020-05-20 PROCEDURE — 1126F PR PAIN SEVERITY QUANTIFIED, NO PAIN PRESENT: ICD-10-PCS | Mod: S$GLB,,, | Performed by: INTERNAL MEDICINE

## 2020-05-20 PROCEDURE — 1126F AMNT PAIN NOTED NONE PRSNT: CPT | Mod: S$GLB,,, | Performed by: INTERNAL MEDICINE

## 2020-05-20 PROCEDURE — 83036 HEMOGLOBIN GLYCOSYLATED A1C: CPT

## 2020-05-20 PROCEDURE — 3074F PR MOST RECENT SYSTOLIC BLOOD PRESSURE < 130 MM HG: ICD-10-PCS | Mod: CPTII,S$GLB,, | Performed by: INTERNAL MEDICINE

## 2020-05-20 PROCEDURE — 99213 PR OFFICE/OUTPT VISIT, EST, LEVL III, 20-29 MIN: ICD-10-PCS | Mod: S$GLB,,, | Performed by: INTERNAL MEDICINE

## 2020-05-20 NOTE — PROGRESS NOTES
Subjective:       Patient ID: Shawanda Desir is a 74 y.o. female.    Chief Complaint: Hypertension    Pt here for check up chronic conditions.    DM: only taking 1/2 tablet metformin. She says she is checking blood sugars and they are in the 70s.  Pt instructed to stop her lipitor when she was hospitalized a few months ago secondary to elevated liver enzymes  htn- she is taking her norvasc and bp has been stable    Review of Systems   Constitutional: Negative for fatigue, fever and unexpected weight change.   HENT: Negative for congestion, postnasal drip, sinus pain and sore throat.    Eyes: Negative for visual disturbance.   Respiratory: Negative for cough, chest tightness, shortness of breath and wheezing.    Cardiovascular: Negative for chest pain, palpitations and leg swelling.   Gastrointestinal: Negative for abdominal pain, blood in stool, constipation, diarrhea, nausea and vomiting.   Endocrine: Negative for cold intolerance and heat intolerance.   Genitourinary: Negative for difficulty urinating, dysuria and frequency.   Musculoskeletal: Negative for back pain, joint swelling and myalgias.   Skin: Negative for rash.   Allergic/Immunologic: Negative for environmental allergies.   Neurological: Negative for dizziness, seizures, numbness and headaches.   Hematological: Does not bruise/bleed easily.   Psychiatric/Behavioral: Negative for agitation and sleep disturbance.       Objective:      Physical Exam   Constitutional: She is oriented to person, place, and time. She appears well-developed and well-nourished.   HENT:   Head: Normocephalic and atraumatic.   Mouth/Throat: Oropharynx is clear and moist.   Eyes: Pupils are equal, round, and reactive to light. Conjunctivae and EOM are normal.   Neck: Normal range of motion. Neck supple. No thyromegaly present.   Cardiovascular: Normal rate, regular rhythm, normal heart sounds and intact distal pulses. Exam reveals no gallop and no friction rub.   No murmur  heard.  Pulmonary/Chest: Effort normal and breath sounds normal. No respiratory distress. She has no wheezes. She has no rales.   Abdominal: Soft. Bowel sounds are normal. She exhibits no distension. There is no tenderness.   Musculoskeletal: Normal range of motion. She exhibits no edema.   Lymphadenopathy:     She has no cervical adenopathy.   Neurological: She is alert and oriented to person, place, and time. No cranial nerve deficit.   Skin: Skin is warm and dry.   Psychiatric: She has a normal mood and affect. Her behavior is normal.       Assessment:       1. Type 2 diabetes mellitus with other specified complication, without long-term current use of insulin    2. Essential hypertension    3. Other hyperlipidemia    4. Type 2 diabetes mellitus with microalbuminuria, without long-term current use of insulin        Plan:       DM=- check a1c, on 250 metfomrin daily. Cannot tolerate higher dose  htn- stable on med  Hyperlipidemia- recheck liver enzymes to see if can resume statin

## 2020-05-21 LAB
ALBUMIN SERPL BCP-MCNC: 3.6 G/DL (ref 3.5–5.2)
ALP SERPL-CCNC: 201 U/L (ref 55–135)
ALT SERPL W/O P-5'-P-CCNC: 22 U/L (ref 10–44)
ANION GAP SERPL CALC-SCNC: 11 MMOL/L (ref 8–16)
AST SERPL-CCNC: 26 U/L (ref 10–40)
BILIRUB SERPL-MCNC: 0.3 MG/DL (ref 0.1–1)
BUN SERPL-MCNC: 9 MG/DL (ref 8–23)
CALCIUM SERPL-MCNC: 10.2 MG/DL (ref 8.7–10.5)
CHLORIDE SERPL-SCNC: 102 MMOL/L (ref 95–110)
CO2 SERPL-SCNC: 24 MMOL/L (ref 23–29)
CREAT SERPL-MCNC: 0.8 MG/DL (ref 0.5–1.4)
EST. GFR  (AFRICAN AMERICAN): >60 ML/MIN/1.73 M^2
EST. GFR  (NON AFRICAN AMERICAN): >60 ML/MIN/1.73 M^2
ESTIMATED AVG GLUCOSE: 157 MG/DL (ref 68–131)
GLUCOSE SERPL-MCNC: 152 MG/DL (ref 70–110)
HBA1C MFR BLD HPLC: 7.1 % (ref 4–5.6)
POTASSIUM SERPL-SCNC: 4.3 MMOL/L (ref 3.5–5.1)
PROT SERPL-MCNC: 8 G/DL (ref 6–8.4)
SODIUM SERPL-SCNC: 137 MMOL/L (ref 136–145)

## 2020-05-26 ENCOUNTER — TELEPHONE (OUTPATIENT)
Dept: FAMILY MEDICINE | Facility: CLINIC | Age: 75
End: 2020-05-26

## 2020-05-26 DIAGNOSIS — M17.0 PRIMARY OSTEOARTHRITIS OF BOTH KNEES: ICD-10-CM

## 2020-05-26 RX ORDER — IBUPROFEN 800 MG/1
800 TABLET ORAL EVERY 6 HOURS PRN
Qty: 60 TABLET | Refills: 1 | OUTPATIENT
Start: 2020-05-26

## 2020-05-26 NOTE — PROGRESS NOTES
Refill Routing Note    Medication(s) are not appropriate for processing by [facility]:       Non-participating provider           Medication reconciliation completed: No      Automatic Epic Protocol Generated Data:    Requested Prescriptions   Pending Prescriptions Disp Refills    ibuprofen (ADVIL,MOTRIN) 800 MG tablet 60 tablet 1     Sig: Take 1 tablet (800 mg total) by mouth every 6 (six) hours as needed for Pain.       NSAIDs Protocol Failed - 5/26/2020 10:56 AM        Failed - Active on medication list        Failed - HGB greater than 10 or HCT greater than 30 in past 12 months        Passed - Patient not currently pregnant        Passed - No positive pregnancy test in past 12 months         Passed - Serum Creatinine less than 1.4 on file in the past 12 months     Lab Results   Component Value Date    CREATININE 0.8 05/20/2020    CREATININE 0.9 02/29/2020    CREATININE 0.8 10/16/2019     Lab Results   Component Value Date    ESTGFRAFRICA >60.0 05/20/2020    ESTGFRAFRICA >60.0 02/29/2020    ESTGFRAFRICA >60.0 10/16/2019               Passed - Visit with authorizing provider in past 12 months or upcoming 90 days        Passed - AST in past 12 months      Lab Results   Component Value Date    AST 26 05/20/2020    AST 19 02/29/2020    AST 14 08/01/2019              Passed - Serum Potassium less than 5.2 on file in the past 12 months     Lab Results   Component Value Date    K 4.3 05/20/2020    K 4.4 02/29/2020    K 4.2 10/16/2019                  Passed - Blood Pressure below 139/89 on file in past 12 months      BP Readings from Last 3 Encounters:   05/20/20 120/82   03/03/20 130/82   01/23/20 134/74             Passed - ALT less than 95 in past 12 months     Lab Results   Component Value Date    ALT 22 05/20/2020    ALT 19 02/29/2020    ALT 14 08/01/2019                 Appointments  past 12m or future 3m with PCP    Date Provider   Last Visit   5/20/2020 Vi Dubose MD   Next Visit   8/25/2020  Vi Dubose MD   ED visits in past 90 days: 0     Note composed:11:42 AM 05/26/2020

## 2020-05-26 NOTE — TELEPHONE ENCOUNTER
----- Message from Km Pemberton sent at 5/26/2020 10:08 AM CDT -----  Contact: same  Type:  RX Refill Request    Who Called:  patient  Refill or New Rx:  new  RX Name and Strength:  ibuprofen (ADVIL,MOTRIN) 800 MG tablet (Discontinued)  How is the patient currently taking it? (ex. 1XDay):  Take 800 mg by mouth every 6 (six) hours as needed for Pain. - Oral  Is this a 30 day or 90 day RX:  Discontinued medication  Preferred Pharmacy with phone number:    [id number] [place] [facility] [address]  [address]  [place] [address]  Phone: [phone] Fax: [phone]    Ordering Provider:  Josefina  Best Call Back Number:  [phone]  Additional Information:  Patient called in and stated she needs a refill on this Rx that was previously filled by Dr. Rocha.  Patient stated she gets this filled annually due to her back issues.

## 2020-05-26 NOTE — TELEPHONE ENCOUNTER
----- Message from Betito Ospina sent at 5/26/2020  9:02 AM CDT -----  Contact: pt  Type:  RX Refill Request    Who Called:  pt  Refill or New Rx:  refill  RX Name and Strength:  Ibuprofin 800mg  How is the patient currently taking it? (ex. 1XDay):  As needed for pain  Is this a 30 day or 90 day RX:  90  Preferred Pharmacy with phone number:    [id number] [place] - [address]  [address]  [place] [address]  Phone: [phone] Fax: [phone]    Local or Mail Order:  local  Ordering Provider:  mauri Hernandez Call Back Number:  [phone]   Additional Information:  Pt is out of meds and was last ordered august 2019

## 2020-06-04 ENCOUNTER — TELEPHONE (OUTPATIENT)
Dept: ORTHOPEDICS | Facility: CLINIC | Age: 75
End: 2020-06-04

## 2020-06-04 DIAGNOSIS — M17.0 BILATERAL PRIMARY OSTEOARTHRITIS OF KNEE: Primary | ICD-10-CM

## 2020-06-04 NOTE — TELEPHONE ENCOUNTER
Spoke with patient. Wishing to get an appointment for B Knee  Injections. Wishing to be prec. For Gel injections. She has tried cortisone injections with [facility] in the past and only last about 4-6 weeks. Appointment made and will prect for gel.

## 2020-06-04 NOTE — TELEPHONE ENCOUNTER
----- Message from Joy Richardson sent at 6/4/2020 10:14 AM CDT -----  Contact: patient  Type: Needs Medical Advice  Who Called:  patient  Symptoms (please be specific):    How long has patient had these symptoms:    Pharmacy name and phone #:    Best Call Back Number: [phone]  Additional Information: requesting a call back to schedule knee injection was previous seen by Tali carr

## 2020-06-05 ENCOUNTER — TELEPHONE (OUTPATIENT)
Dept: FAMILY MEDICINE | Facility: CLINIC | Age: 75
End: 2020-06-05

## 2020-06-05 NOTE — TELEPHONE ENCOUNTER
----- Message from Jes Mosher sent at 6/4/2020 10:02 AM CDT -----  Type: Needs Medical Advice  Who Called:  Self   Symptoms (please be specific):   How long has patient had these symptoms:   Pharmacy name and phone #:  [facility]   Best Call Back Number: [phone]  Additional Information: Patient requesting to speak with the nurse regarding new rx ibuprofen

## 2020-06-08 ENCOUNTER — TELEPHONE (OUTPATIENT)
Dept: ORTHOPEDICS | Facility: CLINIC | Age: 75
End: 2020-06-08

## 2020-06-08 DIAGNOSIS — M17.0 PRIMARY OSTEOARTHRITIS OF BOTH KNEES: ICD-10-CM

## 2020-06-08 RX ORDER — IBUPROFEN 800 MG/1
800 TABLET ORAL EVERY 6 HOURS PRN
Qty: 60 TABLET | Refills: 2 | Status: SHIPPED | OUTPATIENT
Start: 2020-06-08 | End: 2022-02-24

## 2020-06-08 NOTE — TELEPHONE ENCOUNTER
Spoke with patient.patient said she called about this 2 weeks ago with no response. She Is requesting a refill on ibuprofen 800 mg, order pended  LOV 05/26/2020

## 2020-06-08 NOTE — TELEPHONE ENCOUNTER
----- Message from Justin Amaro sent at 6/8/2020 12:13 PM CDT -----  Contact: Mahamed [phone]  Type: Needs Medical Advice    Who Called:  Ptnt [phone]    Additional Information:     Advised needs to speak with the nurse about her medications. Please call.

## 2020-06-08 NOTE — TELEPHONE ENCOUNTER
Called pt. Advised that we have already requested auth from her insurance. If it is not in by the time of her appt, we will call her and let her know. ThanksAurea

## 2020-06-08 NOTE — TELEPHONE ENCOUNTER
----- Message from Chiara Zarco MA sent at 6/8/2020 12:18 PM CDT -----  Contact: pt   Pt states she needs injection on Friday 06/17  She has called [facility] and states she need PA   Call back

## 2020-06-08 NOTE — PROGRESS NOTES
Refill Routing Note     Medication(s) are not appropriate for processing by [facility]:    Non Participating Provider in Refill Center     Appointments  past 12m or future 3m with PCP    Date Provider   Last Visit   5/20/2020 Vi Dubose MD   Next Visit   8/25/2020 Vi Dubose MD           Automatic Epic Protocol Generated Data:    Requested Prescriptions   Pending Prescriptions Disp Refills    ibuprofen (ADVIL,MOTRIN) 800 MG tablet 60 tablet 2     Sig: Take 1 tablet (800 mg total) by mouth every 6 (six) hours as needed for Pain.       NSAIDs Protocol Failed - 6/8/2020  2:49 PM        Failed - Active on medication list        Failed - HGB greater than 10 or HCT greater than 30 in past 12 months        Passed - Patient not currently pregnant        Passed - No positive pregnancy test in past 12 months         Passed - Serum Creatinine less than 1.4 on file in the past 12 months     Lab Results   Component Value Date    CREATININE 0.8 05/20/2020    CREATININE 0.9 02/29/2020    CREATININE 0.8 10/16/2019     Lab Results   Component Value Date    ESTGFRAFRICA >60.0 05/20/2020    ESTGFRAFRICA >60.0 02/29/2020    ESTGFRAFRICA >60.0 10/16/2019               Passed - Visit with authorizing provider in past 12 months or upcoming 90 days        Passed - AST in past 12 months      Lab Results   Component Value Date    AST 26 05/20/2020    AST 19 02/29/2020    AST 14 08/01/2019              Passed - Serum Potassium less than 5.2 on file in the past 12 months     Lab Results   Component Value Date    K 4.3 05/20/2020    K 4.4 02/29/2020    K 4.2 10/16/2019                  Passed - Blood Pressure below 139/89 on file in past 12 months      BP Readings from Last 3 Encounters:   05/20/20 120/82   03/03/20 130/82   01/23/20 134/74             Passed - ALT less than 95 in past 12 months     Lab Results   Component Value Date    ALT 22 05/20/2020    ALT 19 02/29/2020    ALT 14 08/01/2019                  Note composed:2:51 PM 06/08/2020

## 2020-06-16 ENCOUNTER — PATIENT OUTREACH (OUTPATIENT)
Dept: ADMINISTRATIVE | Facility: OTHER | Age: 75
End: 2020-06-16

## 2020-06-17 ENCOUNTER — OFFICE VISIT (OUTPATIENT)
Dept: ORTHOPEDICS | Facility: CLINIC | Age: 75
End: 2020-06-17
Payer: MEDICARE

## 2020-06-17 VITALS
TEMPERATURE: 99 F | HEART RATE: 87 BPM | HEIGHT: 62 IN | DIASTOLIC BLOOD PRESSURE: 87 MMHG | BODY MASS INDEX: 34.64 KG/M2 | WEIGHT: 188.25 LBS | SYSTOLIC BLOOD PRESSURE: 157 MMHG

## 2020-06-17 DIAGNOSIS — M17.0 BILATERAL PRIMARY OSTEOARTHRITIS OF KNEE: Primary | ICD-10-CM

## 2020-06-17 PROCEDURE — 20610 DRAIN/INJ JOINT/BURSA W/O US: CPT | Mod: 50,S$GLB,, | Performed by: NURSE PRACTITIONER

## 2020-06-17 PROCEDURE — 99999 PR PBB SHADOW E&M-EST. PATIENT-LVL III: CPT | Mod: PBBFAC,,, | Performed by: NURSE PRACTITIONER

## 2020-06-17 PROCEDURE — 99999 PR PBB SHADOW E&M-EST. PATIENT-LVL III: ICD-10-PCS | Mod: PBBFAC,,, | Performed by: NURSE PRACTITIONER

## 2020-06-17 PROCEDURE — 1125F PR PAIN SEVERITY QUANTIFIED, PAIN PRESENT: ICD-10-PCS | Mod: S$GLB,,, | Performed by: NURSE PRACTITIONER

## 2020-06-17 PROCEDURE — 3079F PR MOST RECENT DIASTOLIC BLOOD PRESSURE 80-89 MM HG: ICD-10-PCS | Mod: CPTII,S$GLB,, | Performed by: NURSE PRACTITIONER

## 2020-06-17 PROCEDURE — 1159F MED LIST DOCD IN RCRD: CPT | Mod: S$GLB,,, | Performed by: NURSE PRACTITIONER

## 2020-06-17 PROCEDURE — 99499 NO LOS: ICD-10-PCS | Mod: S$GLB,,, | Performed by: NURSE PRACTITIONER

## 2020-06-17 PROCEDURE — 3079F DIAST BP 80-89 MM HG: CPT | Mod: CPTII,S$GLB,, | Performed by: NURSE PRACTITIONER

## 2020-06-17 PROCEDURE — 1101F PR PT FALLS ASSESS DOC 0-1 FALLS W/OUT INJ PAST YR: ICD-10-PCS | Mod: CPTII,S$GLB,, | Performed by: NURSE PRACTITIONER

## 2020-06-17 PROCEDURE — 1101F PT FALLS ASSESS-DOCD LE1/YR: CPT | Mod: CPTII,S$GLB,, | Performed by: NURSE PRACTITIONER

## 2020-06-17 PROCEDURE — 3077F PR MOST RECENT SYSTOLIC BLOOD PRESSURE >= 140 MM HG: ICD-10-PCS | Mod: CPTII,S$GLB,, | Performed by: NURSE PRACTITIONER

## 2020-06-17 PROCEDURE — 20610 LARGE JOINT ASPIRATION/INJECTION: BILATERAL KNEE: ICD-10-PCS | Mod: 50,S$GLB,, | Performed by: NURSE PRACTITIONER

## 2020-06-17 PROCEDURE — 1125F AMNT PAIN NOTED PAIN PRSNT: CPT | Mod: S$GLB,,, | Performed by: NURSE PRACTITIONER

## 2020-06-17 PROCEDURE — 3077F SYST BP >= 140 MM HG: CPT | Mod: CPTII,S$GLB,, | Performed by: NURSE PRACTITIONER

## 2020-06-17 PROCEDURE — 1159F PR MEDICATION LIST DOCUMENTED IN MEDICAL RECORD: ICD-10-PCS | Mod: S$GLB,,, | Performed by: NURSE PRACTITIONER

## 2020-06-17 PROCEDURE — 99499 UNLISTED E&M SERVICE: CPT | Mod: S$GLB,,, | Performed by: NURSE PRACTITIONER

## 2020-06-23 ENCOUNTER — TELEPHONE (OUTPATIENT)
Dept: ORTHOPEDICS | Facility: CLINIC | Age: 75
End: 2020-06-23

## 2020-06-23 NOTE — TELEPHONE ENCOUNTER
----- Message from Chiara Zarco MA sent at 6/23/2020  9:39 AM CDT -----  Contact: pt  Needs to discuss more injections   Call back

## 2020-06-24 ENCOUNTER — OFFICE VISIT (OUTPATIENT)
Dept: ORTHOPEDICS | Facility: CLINIC | Age: 75
End: 2020-06-24
Payer: MEDICARE

## 2020-06-24 ENCOUNTER — TELEPHONE (OUTPATIENT)
Dept: ORTHOPEDICS | Facility: CLINIC | Age: 75
End: 2020-06-24

## 2020-06-24 VITALS
HEART RATE: 92 BPM | SYSTOLIC BLOOD PRESSURE: 182 MMHG | DIASTOLIC BLOOD PRESSURE: 84 MMHG | TEMPERATURE: 98 F | WEIGHT: 188.25 LBS | HEIGHT: 62 IN | BODY MASS INDEX: 34.64 KG/M2

## 2020-06-24 DIAGNOSIS — M17.0 BILATERAL PRIMARY OSTEOARTHRITIS OF KNEE: Primary | ICD-10-CM

## 2020-06-24 PROCEDURE — 1159F MED LIST DOCD IN RCRD: CPT | Mod: S$GLB,,, | Performed by: NURSE PRACTITIONER

## 2020-06-24 PROCEDURE — 99999 PR PBB SHADOW E&M-EST. PATIENT-LVL III: ICD-10-PCS | Mod: PBBFAC,,, | Performed by: NURSE PRACTITIONER

## 2020-06-24 PROCEDURE — 1125F PR PAIN SEVERITY QUANTIFIED, PAIN PRESENT: ICD-10-PCS | Mod: S$GLB,,, | Performed by: NURSE PRACTITIONER

## 2020-06-24 PROCEDURE — 1101F PT FALLS ASSESS-DOCD LE1/YR: CPT | Mod: CPTII,S$GLB,, | Performed by: NURSE PRACTITIONER

## 2020-06-24 PROCEDURE — 20610 DRAIN/INJ JOINT/BURSA W/O US: CPT | Mod: 50,S$GLB,, | Performed by: NURSE PRACTITIONER

## 2020-06-24 PROCEDURE — 3077F SYST BP >= 140 MM HG: CPT | Mod: CPTII,S$GLB,, | Performed by: NURSE PRACTITIONER

## 2020-06-24 PROCEDURE — 20610 LARGE JOINT ASPIRATION/INJECTION: BILATERAL KNEE: ICD-10-PCS | Mod: 50,S$GLB,, | Performed by: NURSE PRACTITIONER

## 2020-06-24 PROCEDURE — 99214 PR OFFICE/OUTPT VISIT, EST, LEVL IV, 30-39 MIN: ICD-10-PCS | Mod: 25,S$GLB,, | Performed by: NURSE PRACTITIONER

## 2020-06-24 PROCEDURE — 99214 OFFICE O/P EST MOD 30 MIN: CPT | Mod: 25,S$GLB,, | Performed by: NURSE PRACTITIONER

## 2020-06-24 PROCEDURE — 3079F PR MOST RECENT DIASTOLIC BLOOD PRESSURE 80-89 MM HG: ICD-10-PCS | Mod: CPTII,S$GLB,, | Performed by: NURSE PRACTITIONER

## 2020-06-24 PROCEDURE — 99999 PR PBB SHADOW E&M-EST. PATIENT-LVL III: CPT | Mod: PBBFAC,,, | Performed by: NURSE PRACTITIONER

## 2020-06-24 PROCEDURE — 1101F PR PT FALLS ASSESS DOC 0-1 FALLS W/OUT INJ PAST YR: ICD-10-PCS | Mod: CPTII,S$GLB,, | Performed by: NURSE PRACTITIONER

## 2020-06-24 PROCEDURE — 3077F PR MOST RECENT SYSTOLIC BLOOD PRESSURE >= 140 MM HG: ICD-10-PCS | Mod: CPTII,S$GLB,, | Performed by: NURSE PRACTITIONER

## 2020-06-24 PROCEDURE — 1159F PR MEDICATION LIST DOCUMENTED IN MEDICAL RECORD: ICD-10-PCS | Mod: S$GLB,,, | Performed by: NURSE PRACTITIONER

## 2020-06-24 PROCEDURE — 3079F DIAST BP 80-89 MM HG: CPT | Mod: CPTII,S$GLB,, | Performed by: NURSE PRACTITIONER

## 2020-06-24 PROCEDURE — 1125F AMNT PAIN NOTED PAIN PRSNT: CPT | Mod: S$GLB,,, | Performed by: NURSE PRACTITIONER

## 2020-06-24 RX ORDER — TRIAMCINOLONE ACETONIDE 40 MG/ML
40 INJECTION, SUSPENSION INTRA-ARTICULAR; INTRAMUSCULAR
Status: DISCONTINUED | OUTPATIENT
Start: 2020-06-24 | End: 2020-06-24 | Stop reason: HOSPADM

## 2020-06-24 RX ADMIN — TRIAMCINOLONE ACETONIDE 40 MG: 40 INJECTION, SUSPENSION INTRA-ARTICULAR; INTRAMUSCULAR at 10:06

## 2020-06-24 NOTE — TELEPHONE ENCOUNTER
----- Message from Chiara Zarco MA sent at 6/24/2020  9:26 AM CDT -----  Contact: pt  Injections  Call back

## 2020-06-24 NOTE — PROGRESS NOTES
Digital Medicine: Health  Follow-Up    Called to check in w/ Ms. Mayberry - patient attributes missing readings to being busy but will start submitting BP readings today.    Patient is having issues w/ her knees - been in contact w/ doctor about getting injections for pain.    Patient had heart attack last year and attributes the attack to a medication that was prescribed to her. Patient states she recently received a bill where the report stated she had drugs in her system - she has questions regarding further details and plans to call her doctor back.     The history is provided by the patient.     INTERVENTION(S)  encouragement/support    PLAN  patient verbalizes understanding        Topic    Urine Protein Check     Eye Exam      Last 5 Patient Entered Readings                                      Current 30 Day Average:      Recent Readings 4/8/2020 4/8/2020 3/23/2020 3/23/2020 3/13/2020    SBP (mmHg) 147 151 157 158 141    DBP (mmHg) 77 75 78 79 66    Pulse 84 85 84 87 77

## 2020-06-24 NOTE — TELEPHONE ENCOUNTER
----- Message from Chiara Zarco MA sent at 6/24/2020  8:02 AM CDT -----  Contact: pt  Missed call   Call back [phone]

## 2020-06-24 NOTE — TELEPHONE ENCOUNTER
Called pt back. She states that the Monovisc injections she received last Wednesday have not helped. Pt wants to come in today for Kenalog injections. Scheduled appt with KELECHI Romano,NPC. Thanks, Aurea

## 2020-06-24 NOTE — PROCEDURES
Large Joint Aspiration/Injection: bilateral knee    Date/Time: 6/24/2020 10:30 AM  Performed by: Christiane Cabrera NP  Authorized by: Christiane Cabrera NP     Consent Done?:  Yes (Verbal)  Indications:  Pain  Site marked: the procedure site was marked    Timeout: prior to procedure the correct patient, procedure, and site was verified    Prep: patient was prepped and draped in usual sterile fashion      Local anesthesia used?: Yes    Local anesthetic:  Lidocaine 1% without epinephrine  Anesthetic total (ml):  5      Details:  Needle Size:  21 G  Approach:  Anterolateral  Location:  Knee  Laterality:  Bilateral  Site:  Bilateral knee  Medications (Right):  40 mg triamcinolone acetonide 40 mg/mL  Medications (Left):  40 mg triamcinolone acetonide 40 mg/mL  Patient tolerance:  Patient tolerated the procedure well with no immediate complications

## 2020-06-24 NOTE — PROGRESS NOTES
Chief Complaint   Patient presents with    Right Knee - Pain    Left Knee - Pain         HPI:   This is a 74 y.o. who presents to clinic today complaining of bilateral knee pain for years after no known trauma. Pain is progressively worsening with no relief after Monovisc injection last week. No numbness or tingling. No associated signs or symptoms.    Past Medical History:   Diagnosis Date    Diabetes mellitus     Hypertension      Past Surgical History:   Procedure Laterality Date    BACK SURGERY      BREAST BIOPSY Right     neg--core    CERVICAL SPINE SURGERY       Current Outpatient Medications on File Prior to Visit   Medication Sig Dispense Refill    allopurinol (ZYLOPRIM) 100 MG tablet Take 1 tablet (100 mg total) by mouth once daily. 90 tablet 3    amLODIPine (NORVASC) 10 MG tablet Take 1 tablet (10 mg total) by mouth once daily. 90 tablet 3    aspirin (ECOTRIN) 81 MG EC tablet Take 81 mg by mouth once daily.      atorvastatin (LIPITOR) 10 MG tablet TAKE 1 TABLET BY MOUTH EVERY DAY IN THE EVENING 90 tablet 2    ibuprofen (ADVIL,MOTRIN) 800 MG tablet Take 1 tablet (800 mg total) by mouth every 6 (six) hours as needed for Pain. 60 tablet 2    metFORMIN (GLUCOPHAGE-XR) 500 MG 24 hr tablet Take 1 tablet (500 mg total) by mouth daily with breakfast. 90 tablet 3    triamcinolone acetonide 0.5% (KENALOG) 0.5 % Crea Apply topically 2 (two) times daily. Use for no more than 2 weeks 15 g 0     No current facility-administered medications on file prior to visit.      Review of patient's allergies indicates:   Allergen Reactions    Clonidine      Causes chest tightness and leg swelling    Tradjenta [linagliptin]      Chest tightness and leg swelling     Family History   Problem Relation Age of Onset    Cancer Father      Social History     Socioeconomic History    Marital status:      Spouse name: Not on file    Number of children: Not on file    Years of education: Not on file    Highest  education level: Not on file   Occupational History    Not on file   Social Needs    Financial resource strain: Not on file    Food insecurity     Worry: Not on file     Inability: Not on file    Transportation needs     Medical: Not on file     Non-medical: Not on file   Tobacco Use    Smoking status: Never Smoker    Smokeless tobacco: Never Used   Substance and Sexual Activity    Alcohol use: No     Frequency: Never    Drug use: No    Sexual activity: Not on file   Lifestyle    Physical activity     Days per week: Not on file     Minutes per session: Not on file    Stress: Not on file   Relationships    Social connections     Talks on phone: Not on file     Gets together: Not on file     Attends Christian service: Not on file     Active member of club or organization: Not on file     Attends meetings of clubs or organizations: Not on file     Relationship status: Not on file   Other Topics Concern    Are you pregnant or think you may be? Not Asked    Breast-feeding Not Asked   Social History Narrative    Not on file       Review of Systems:  Constitutional:  Denies fever or chills   Eyes:  Denies change in visual acuity   HENT:  Denies nasal congestion or sore throat   Respiratory:  Denies cough or shortness of breath   Cardiovascular:  Denies chest pain or edema   GI:  Denies abdominal pain, nausea, vomiting, bloody stools or diarrhea   :  Denies dysuria   Integument:  Denies rash   Neurologic:  Denies headache, focal weakness or sensory changes   Endocrine:  Denies polyuria or polydipsia   Lymphatic:  Denies swollen glands   Psychiatric:  Denies depression or anxiety     Physical Exam:   Constitutional:  Well developed, well nourished, no acute distress, non-toxic appearance   Integument:  Well hydrated, no rash   Lymphatic:  No lymphadenopathy noted   Neurologic:  Alert & oriented x 3  Psychiatric:  Speech and behavior appropriate   Eyes: EOMI  Gi: abdomen soft    Bilateral Knee  Exam        Tenderness   The patient is experiencing tenderness in the medial joint line.    Range of Motion   Extension: abnormal   Flexion: abnormal     Muscle Strength     The patient has normal knee strength.    Tests   Aide:  Medial - positive   Lachman:  Anterior - negative      Varus: negative  Valgus: negative  Patellar Apprehension: negative    Other   Erythema: absent  Sensation: normal  Pulse: present  Swelling: mild        Bilateral primary osteoarthritis of knee  -     Large Joint Aspiration/Injection: bilateral knee            Using an aseptic technique, I injected 5 cc of lidocaine 1% without and 1 cc of kenalog 40mg into the bilateral knee. The patient tolerated this well. I will have them return to clinic as needed.

## 2020-07-05 NOTE — PROGRESS NOTES
Chief Complaint   Patient presents with    Right Knee - Pain    Left Knee - Pain         HPI:   This is a 74 y.o. who presents to clinic today complaining of left knee pain for years after no known trauma. Pain is progressively worsening. No numbness or tingling. No associated signs or symptoms. Requesting HA injection today.      Past Medical History:   Diagnosis Date    Diabetes mellitus     Hypertension      Past Surgical History:   Procedure Laterality Date    BACK SURGERY      BREAST BIOPSY Right     neg--core    CERVICAL SPINE SURGERY       Current Outpatient Medications on File Prior to Visit   Medication Sig Dispense Refill    allopurinol (ZYLOPRIM) 100 MG tablet Take 1 tablet (100 mg total) by mouth once daily. 90 tablet 3    amLODIPine (NORVASC) 10 MG tablet Take 1 tablet (10 mg total) by mouth once daily. 90 tablet 3    aspirin (ECOTRIN) 81 MG EC tablet Take 81 mg by mouth once daily.      atorvastatin (LIPITOR) 10 MG tablet TAKE 1 TABLET BY MOUTH EVERY DAY IN THE EVENING 90 tablet 2    ibuprofen (ADVIL,MOTRIN) 800 MG tablet Take 1 tablet (800 mg total) by mouth every 6 (six) hours as needed for Pain. 60 tablet 2    metFORMIN (GLUCOPHAGE-XR) 500 MG 24 hr tablet Take 1 tablet (500 mg total) by mouth daily with breakfast. 90 tablet 3    triamcinolone acetonide 0.5% (KENALOG) 0.5 % Crea Apply topically 2 (two) times daily. Use for no more than 2 weeks 15 g 0     No current facility-administered medications on file prior to visit.      Review of patient's allergies indicates:   Allergen Reactions    Clonidine      Causes chest tightness and leg swelling    Tradjenta [linagliptin]      Chest tightness and leg swelling     Family History   Problem Relation Age of Onset    Cancer Father      Social History     Socioeconomic History    Marital status:      Spouse name: Not on file    Number of children: Not on file    Years of education: Not on file    Highest education level: Not on  file   Occupational History    Not on file   Social Needs    Financial resource strain: Not on file    Food insecurity     Worry: Not on file     Inability: Not on file    Transportation needs     Medical: Not on file     Non-medical: Not on file   Tobacco Use    Smoking status: Never Smoker    Smokeless tobacco: Never Used   Substance and Sexual Activity    Alcohol use: No     Frequency: Never    Drug use: No    Sexual activity: Not on file   Lifestyle    Physical activity     Days per week: Not on file     Minutes per session: Not on file    Stress: Not on file   Relationships    Social connections     Talks on phone: Not on file     Gets together: Not on file     Attends Worship service: Not on file     Active member of club or organization: Not on file     Attends meetings of clubs or organizations: Not on file     Relationship status: Not on file   Other Topics Concern    Are you pregnant or think you may be? Not Asked    Breast-feeding Not Asked   Social History Narrative    Not on file       Review of Systems:  Constitutional:  Denies fever or chills   Eyes:  Denies change in visual acuity   HENT:  Denies nasal congestion or sore throat   Respiratory:  Denies cough or shortness of breath   Cardiovascular:  Denies chest pain or edema   GI:  Denies abdominal pain, nausea, vomiting, bloody stools or diarrhea   :  Denies dysuria   Integument:  Denies rash   Neurologic:  Denies headache, focal weakness or sensory changes   Endocrine:  Denies polyuria or polydipsia   Lymphatic:  Denies swollen glands   Psychiatric:  Denies depression or anxiety     Physical Exam:   Constitutional:  Well developed, well nourished, no acute distress, non-toxic appearance   Integument:  Well hydrated, no rash   Lymphatic:  No lymphadenopathy noted   Neurologic:  Alert & oriented x 3  Psychiatric:  Speech and behavior appropriate   Eyes: EOMI  Gi: abdomen soft    Bilateral Knee Exam        Tenderness   The patient is  experiencing tenderness in the medial joint line.    Range of Motion   Extension: abnormal   Flexion: abnormal     Muscle Strength     The patient has normal knee strength.    Tests   Aide:  Medial - positive   Lachman:  Anterior - negative      Varus: negative  Valgus: negative  Patellar Apprehension: negative    Other   Erythema: absent  Sensation: normal  Pulse: present  Swelling: mild        Bilateral primary osteoarthritis of knee  -     Large Joint Aspiration/Injection: bilateral knee          Using an aseptic technique, I injected Monovisc into the bilateral knee. The patient tolerated this well. I will see them back as needed.

## 2020-07-05 NOTE — PROCEDURES
Large Joint Aspiration/Injection: bilateral knee    Date/Time: 6/17/2020 1:45 PM  Performed by: Christiane Cabrera NP  Authorized by: Christiane Cabrera NP     Consent Done?:  Yes (Verbal)  Indications:  Pain  Site marked: the procedure site was marked    Timeout: prior to procedure the correct patient, procedure, and site was verified    Prep: patient was prepped and draped in usual sterile fashion      Local anesthesia used?: Yes    Local anesthetic:  Lidocaine 1% without epinephrine  Anesthetic total (ml):  5      Details:  Needle Size:  21 G  Approach:  Anterolateral  Location:  Knee  Laterality:  Bilateral  Site:  Bilateral knee  Medications (Right):  4 mL hyaluronate sodium, stabilized 88 mg/4 mL  Medications (Left):  4 mL hyaluronate sodium, stabilized 88 mg/4 mL  Patient tolerance:  Patient tolerated the procedure well with no immediate complications

## 2020-07-16 DIAGNOSIS — E78.49 OTHER HYPERLIPIDEMIA: ICD-10-CM

## 2020-07-16 NOTE — TELEPHONE ENCOUNTER
----- Message from Km Pemberton sent at 7/16/2020 12:00 PM CDT -----  Contact: Rhona/Bit Cauldron  Rhona called in and wanted to verify whether or not patient was still currently on any cholesterol medication?  Rhona's call back number is [phone]

## 2020-07-16 NOTE — PROGRESS NOTES
Refill Routing Note   Medication(s) are not appropriate for processing by [facility]:       - Non-participating provider           Medication reconciliation completed: No      Automatic Epic Generated Protocol Data:    Requested Prescriptions   Pending Prescriptions Disp Refills    atorvastatin (LIPITOR) 10 MG tablet 90 tablet 2     Sig: Take 1 tablet (10 mg total) by mouth every evening.       Hmg CoA Reductase Inhibitors Protocol Passed - 7/16/2020  1:39 PM        Passed - Patient not currently pregnant        Passed - No positive pregnancy test in past 12 months         Passed - Recent or future visit with authorizing provider        Passed - Lipid Panel within past 12 months     Lab Results   Component Value Date    CHOL 192 08/01/2019    HDL 50 08/01/2019    LDLCALC 125.2 08/01/2019    TRIG 84 08/01/2019             Passed - ALT less than 95 in past 12 months      Lab Results   Component Value Date    ALT 22 05/20/2020    ALT 19 02/29/2020    ALT 14 08/01/2019                    Appointments  past 12m or future 3m with PCP    Date Provider   Last Visit   5/20/2020 Vi Dubose MD   Next Visit   8/25/2020 Vi Dubose MD   ED visits in past 90 days: 0     Note composed:3:09 PM 07/16/2020

## 2020-07-22 RX ORDER — ATORVASTATIN CALCIUM 10 MG/1
10 TABLET, FILM COATED ORAL NIGHTLY
Qty: 90 TABLET | Refills: 2 | Status: SHIPPED | OUTPATIENT
Start: 2020-07-22 | End: 2021-04-22

## 2020-08-17 NOTE — PROGRESS NOTES
Chart review completed. BP average above goal 150/75 mmHg. Patient is no longer taking olmesartan. Will further assess need of medication (ARB). CMP is up to date and within normal limits.  Last 5 Patient Entered Readings                                      Current 30 Day Average: 150/75     Recent Readings 8/11/2020 8/11/2020 8/11/2020 8/11/2020 8/11/2020    SBP (mmHg) 149 164 154 156 162    DBP (mmHg) 80 80 78 77 77    Pulse 75 76 77 75 79

## 2020-08-21 ENCOUNTER — PATIENT OUTREACH (OUTPATIENT)
Dept: OTHER | Facility: OTHER | Age: 75
End: 2020-08-21

## 2020-08-21 NOTE — PROGRESS NOTES
"Digital Medicine: Clinician Follow-Up    Called patient to follow up on HDMP. She reports that her issue is that when she puts the cuff on her arm, she realizes that she is not putting it on correctly. She states that her BP reading when a home health nurse checked on Tuesday using a personal cuff was "131/70 something". She reports that she does not sit down long enough before she actually pushes the button to check her BP. She states that she is unable to close the cuff tight enough to get an accurate the readings.     The history is provided by the patient.   Follow-up reason(s): routine follow up.     Hypertension    Readings are trending up   Patient is not experiencing signs/symptoms of hypotension.  Patient is not experiencing signs/symptoms of hypertension.          Last 5 Patient Entered Readings                                      Current 30 Day Average: 150/76     Recent Readings 8/18/2020 8/11/2020 8/11/2020 8/11/2020 8/11/2020    SBP (mmHg) 149 149 164 154 156    DBP (mmHg) 79 80 80 78 77    Pulse 82 75 76 77 75               Depression Screening  Did not address depression screening.    Sleep Apnea Screening    Did not address sleep apnea screening.     Medication Affordability Screening  Did not address medication affordability screening.     Medication Adherence-Medication adherence was assessed.          ASSESSMENT(S)  Patients BP average is 150/76 mmHg, which is above goal. Patient's BP goal is less than or equal to 130/80 per 2017 ACC/AHA Hypertension Guidelines.     Ms. Desir is taking 1 antihypertensive agent - DHP CCB. Give patients demographic background, this medication is appropriate. May require re-initiation of olmesartan 40 mg for adequate BP control.       Hypertension Plan  Additional monitoring needed. Will consider re-starting olmesartan at a low dose to help lower BP.  Continue current therapy.  Provided patient education. Reviewed methods that she could use to ensure proper " placement of her BP cuff. Encouraged patient to also ask for assistance from family members to place the BP cuff on her arm.       Addressed any questions or concerns and patient has my contact information if needed prior to next outreach. Patient verbalizes understanding.                Topic    Urine Protein Check     Eye Exam     Lipid (Cholesterol) Test     Hemoglobin A1C      Hypertension Medications             amLODIPine (NORVASC) 10 MG tablet Take 1 tablet (10 mg total) by mouth once daily.

## 2020-08-25 ENCOUNTER — LAB VISIT (OUTPATIENT)
Dept: LAB | Facility: HOSPITAL | Age: 75
End: 2020-08-25
Attending: INTERNAL MEDICINE
Payer: MEDICARE

## 2020-08-25 ENCOUNTER — OFFICE VISIT (OUTPATIENT)
Dept: FAMILY MEDICINE | Facility: CLINIC | Age: 75
End: 2020-08-25
Payer: MEDICARE

## 2020-08-25 VITALS
DIASTOLIC BLOOD PRESSURE: 70 MMHG | WEIGHT: 192 LBS | BODY MASS INDEX: 35.33 KG/M2 | HEIGHT: 62 IN | HEART RATE: 76 BPM | SYSTOLIC BLOOD PRESSURE: 128 MMHG | TEMPERATURE: 98 F

## 2020-08-25 DIAGNOSIS — E11.69 TYPE 2 DIABETES MELLITUS WITH OTHER SPECIFIED COMPLICATION, WITHOUT LONG-TERM CURRENT USE OF INSULIN: ICD-10-CM

## 2020-08-25 DIAGNOSIS — Z11.59 NEED FOR HEPATITIS C SCREENING TEST: ICD-10-CM

## 2020-08-25 DIAGNOSIS — Z11.59 NEED FOR HEPATITIS C SCREENING TEST: Primary | ICD-10-CM

## 2020-08-25 DIAGNOSIS — I10 ESSENTIAL HYPERTENSION: ICD-10-CM

## 2020-08-25 LAB
ALBUMIN SERPL BCP-MCNC: 3.7 G/DL (ref 3.5–5.2)
ALBUMIN/CREAT UR: 7.3 UG/MG (ref 0–30)
ALP SERPL-CCNC: 218 U/L (ref 55–135)
ALT SERPL W/O P-5'-P-CCNC: 21 U/L (ref 10–44)
ANION GAP SERPL CALC-SCNC: 12 MMOL/L (ref 8–16)
AST SERPL-CCNC: 18 U/L (ref 10–40)
BASOPHILS # BLD AUTO: 0.04 K/UL (ref 0–0.2)
BASOPHILS NFR BLD: 0.4 % (ref 0–1.9)
BILIRUB SERPL-MCNC: 0.4 MG/DL (ref 0.1–1)
BILIRUB UR QL STRIP: NEGATIVE
BUN SERPL-MCNC: 11 MG/DL (ref 8–23)
CALCIUM SERPL-MCNC: 10.4 MG/DL (ref 8.7–10.5)
CHLORIDE SERPL-SCNC: 103 MMOL/L (ref 95–110)
CHOLEST SERPL-MCNC: 225 MG/DL (ref 120–199)
CHOLEST/HDLC SERPL: 4 {RATIO} (ref 2–5)
CLARITY UR: CLEAR
CO2 SERPL-SCNC: 26 MMOL/L (ref 23–29)
COLOR UR: YELLOW
CREAT SERPL-MCNC: 0.8 MG/DL (ref 0.5–1.4)
CREAT UR-MCNC: 124 MG/DL (ref 15–325)
DIFFERENTIAL METHOD: ABNORMAL
EOSINOPHIL # BLD AUTO: 0.3 K/UL (ref 0–0.5)
EOSINOPHIL NFR BLD: 3.4 % (ref 0–8)
ERYTHROCYTE [DISTWIDTH] IN BLOOD BY AUTOMATED COUNT: 14.6 % (ref 11.5–14.5)
EST. GFR  (AFRICAN AMERICAN): >60 ML/MIN/1.73 M^2
EST. GFR  (NON AFRICAN AMERICAN): >60 ML/MIN/1.73 M^2
ESTIMATED AVG GLUCOSE: 171 MG/DL (ref 68–131)
GLUCOSE SERPL-MCNC: 132 MG/DL (ref 70–110)
GLUCOSE UR QL STRIP: NEGATIVE
HBA1C MFR BLD HPLC: 7.6 % (ref 4–5.6)
HCT VFR BLD AUTO: 45.7 % (ref 37–48.5)
HDLC SERPL-MCNC: 56 MG/DL (ref 40–75)
HDLC SERPL: 24.9 % (ref 20–50)
HGB BLD-MCNC: 13.9 G/DL (ref 12–16)
HGB UR QL STRIP: NEGATIVE
IMM GRANULOCYTES # BLD AUTO: 0.02 K/UL (ref 0–0.04)
IMM GRANULOCYTES NFR BLD AUTO: 0.2 % (ref 0–0.5)
KETONES UR QL STRIP: NEGATIVE
LDLC SERPL CALC-MCNC: 154.8 MG/DL (ref 63–159)
LEUKOCYTE ESTERASE UR QL STRIP: NEGATIVE
LYMPHOCYTES # BLD AUTO: 3.3 K/UL (ref 1–4.8)
LYMPHOCYTES NFR BLD: 35.8 % (ref 18–48)
MCH RBC QN AUTO: 29.6 PG (ref 27–31)
MCHC RBC AUTO-ENTMCNC: 30.4 G/DL (ref 32–36)
MCV RBC AUTO: 97 FL (ref 82–98)
MICROALBUMIN UR DL<=1MG/L-MCNC: 9 UG/ML
MONOCYTES # BLD AUTO: 0.7 K/UL (ref 0.3–1)
MONOCYTES NFR BLD: 7.5 % (ref 4–15)
NEUTROPHILS # BLD AUTO: 4.8 K/UL (ref 1.8–7.7)
NEUTROPHILS NFR BLD: 52.7 % (ref 38–73)
NITRITE UR QL STRIP: NEGATIVE
NONHDLC SERPL-MCNC: 169 MG/DL
NRBC BLD-RTO: 0 /100 WBC
PH UR STRIP: 7 [PH] (ref 5–8)
PLATELET # BLD AUTO: 259 K/UL (ref 150–350)
PMV BLD AUTO: 11.4 FL (ref 9.2–12.9)
POTASSIUM SERPL-SCNC: 4.5 MMOL/L (ref 3.5–5.1)
PROT SERPL-MCNC: 7.8 G/DL (ref 6–8.4)
PROT UR QL STRIP: NEGATIVE
RBC # BLD AUTO: 4.69 M/UL (ref 4–5.4)
SODIUM SERPL-SCNC: 141 MMOL/L (ref 136–145)
SP GR UR STRIP: 1.02 (ref 1–1.03)
TRIGL SERPL-MCNC: 71 MG/DL (ref 30–150)
TSH SERPL DL<=0.005 MIU/L-ACNC: 0.89 UIU/ML (ref 0.4–4)
URN SPEC COLLECT METH UR: NORMAL
WBC # BLD AUTO: 9.16 K/UL (ref 3.9–12.7)

## 2020-08-25 PROCEDURE — 3078F PR MOST RECENT DIASTOLIC BLOOD PRESSURE < 80 MM HG: ICD-10-PCS | Mod: CPTII,S$GLB,, | Performed by: INTERNAL MEDICINE

## 2020-08-25 PROCEDURE — 83036 HEMOGLOBIN GLYCOSYLATED A1C: CPT

## 2020-08-25 PROCEDURE — 99499 UNLISTED E&M SERVICE: CPT | Mod: S$GLB,,, | Performed by: INTERNAL MEDICINE

## 2020-08-25 PROCEDURE — 3074F PR MOST RECENT SYSTOLIC BLOOD PRESSURE < 130 MM HG: ICD-10-PCS | Mod: CPTII,S$GLB,, | Performed by: INTERNAL MEDICINE

## 2020-08-25 PROCEDURE — 1101F PR PT FALLS ASSESS DOC 0-1 FALLS W/OUT INJ PAST YR: ICD-10-PCS | Mod: CPTII,S$GLB,, | Performed by: INTERNAL MEDICINE

## 2020-08-25 PROCEDURE — 3078F DIAST BP <80 MM HG: CPT | Mod: CPTII,S$GLB,, | Performed by: INTERNAL MEDICINE

## 2020-08-25 PROCEDURE — 80053 COMPREHEN METABOLIC PANEL: CPT

## 2020-08-25 PROCEDURE — 85025 COMPLETE CBC W/AUTO DIFF WBC: CPT

## 2020-08-25 PROCEDURE — 99999 PR PBB SHADOW E&M-EST. PATIENT-LVL IV: ICD-10-PCS | Mod: PBBFAC,,, | Performed by: INTERNAL MEDICINE

## 2020-08-25 PROCEDURE — 82043 UR ALBUMIN QUANTITATIVE: CPT

## 2020-08-25 PROCEDURE — 3074F SYST BP LT 130 MM HG: CPT | Mod: CPTII,S$GLB,, | Performed by: INTERNAL MEDICINE

## 2020-08-25 PROCEDURE — 1159F MED LIST DOCD IN RCRD: CPT | Mod: S$GLB,,, | Performed by: INTERNAL MEDICINE

## 2020-08-25 PROCEDURE — 1101F PT FALLS ASSESS-DOCD LE1/YR: CPT | Mod: CPTII,S$GLB,, | Performed by: INTERNAL MEDICINE

## 2020-08-25 PROCEDURE — 99499 RISK ADDL DX/OHS AUDIT: ICD-10-PCS | Mod: S$GLB,,, | Performed by: INTERNAL MEDICINE

## 2020-08-25 PROCEDURE — 99213 PR OFFICE/OUTPT VISIT, EST, LEVL III, 20-29 MIN: ICD-10-PCS | Mod: S$GLB,,, | Performed by: INTERNAL MEDICINE

## 2020-08-25 PROCEDURE — 1126F PR PAIN SEVERITY QUANTIFIED, NO PAIN PRESENT: ICD-10-PCS | Mod: S$GLB,,, | Performed by: INTERNAL MEDICINE

## 2020-08-25 PROCEDURE — 84443 ASSAY THYROID STIM HORMONE: CPT

## 2020-08-25 PROCEDURE — 99213 OFFICE O/P EST LOW 20 MIN: CPT | Mod: S$GLB,,, | Performed by: INTERNAL MEDICINE

## 2020-08-25 PROCEDURE — 36415 COLL VENOUS BLD VENIPUNCTURE: CPT | Mod: PO

## 2020-08-25 PROCEDURE — 99999 PR PBB SHADOW E&M-EST. PATIENT-LVL IV: CPT | Mod: PBBFAC,,, | Performed by: INTERNAL MEDICINE

## 2020-08-25 PROCEDURE — 3051F PR MOST RECENT HEMOGLOBIN A1C LEVEL 7.0 - < 8.0%: ICD-10-PCS | Mod: CPTII,S$GLB,, | Performed by: INTERNAL MEDICINE

## 2020-08-25 PROCEDURE — 81003 URINALYSIS AUTO W/O SCOPE: CPT | Mod: PO

## 2020-08-25 PROCEDURE — 1126F AMNT PAIN NOTED NONE PRSNT: CPT | Mod: S$GLB,,, | Performed by: INTERNAL MEDICINE

## 2020-08-25 PROCEDURE — 1159F PR MEDICATION LIST DOCUMENTED IN MEDICAL RECORD: ICD-10-PCS | Mod: S$GLB,,, | Performed by: INTERNAL MEDICINE

## 2020-08-25 PROCEDURE — 3051F HG A1C>EQUAL 7.0%<8.0%: CPT | Mod: CPTII,S$GLB,, | Performed by: INTERNAL MEDICINE

## 2020-08-25 PROCEDURE — 80061 LIPID PANEL: CPT

## 2020-08-25 PROCEDURE — 86803 HEPATITIS C AB TEST: CPT

## 2020-08-25 NOTE — PROGRESS NOTES
Subjective:       Patient ID: Shawanda Desir is a 74 y.o. female.    Chief Complaint: Follow-up (3 month ) and Polyuria (6 times at night )    Pt here for 3 mo check up.    DM: stable on metfomrin, blood sugars fasting under 140. Last a1c came down to 7.1 . On statin. Due eye exam. Due for foot exam  htn- stable on norvasc  hyperlipidemai- due labs , on lipitor.          Review of Systems   Constitutional: Negative for fatigue, fever and unexpected weight change.   HENT: Negative for nasal congestion, postnasal drip and sore throat.    Eyes: Negative for visual disturbance.   Respiratory: Negative for cough, chest tightness, shortness of breath and wheezing.    Cardiovascular: Negative for chest pain, palpitations and leg swelling.   Gastrointestinal: Negative for abdominal pain, blood in stool, constipation, diarrhea, nausea and vomiting.   Endocrine: Negative for cold intolerance and heat intolerance.   Genitourinary: Negative for difficulty urinating, dysuria and frequency.   Musculoskeletal: Negative for back pain, joint swelling and myalgias.   Integumentary:  Negative for rash.   Allergic/Immunologic: Negative for environmental allergies.   Neurological: Negative for dizziness, seizures, numbness and headaches.   Hematological: Does not bruise/bleed easily.   Psychiatric/Behavioral: Negative for agitation and sleep disturbance.         Objective:      Physical Exam  Constitutional:       Appearance: She is well-developed.   HENT:      Head: Normocephalic and atraumatic.   Eyes:      Conjunctiva/sclera: Conjunctivae normal.      Pupils: Pupils are equal, round, and reactive to light.   Neck:      Musculoskeletal: Normal range of motion and neck supple.      Thyroid: No thyromegaly.   Cardiovascular:      Rate and Rhythm: Normal rate and regular rhythm.      Heart sounds: Normal heart sounds. No murmur. No friction rub. No gallop.    Pulmonary:      Effort: Pulmonary effort is normal. No respiratory distress.       Breath sounds: Normal breath sounds. No wheezing or rales.   Abdominal:      General: Bowel sounds are normal. There is no distension.      Palpations: Abdomen is soft.      Tenderness: There is no abdominal tenderness.   Musculoskeletal: Normal range of motion.   Lymphadenopathy:      Cervical: No cervical adenopathy.   Skin:     General: Skin is warm and dry.   Neurological:      Mental Status: She is alert and oriented to person, place, and time.      Cranial Nerves: No cranial nerve deficit.   Psychiatric:         Behavior: Behavior normal.         Assessment:       1. Need for hepatitis C screening test    2. Type 2 diabetes mellitus with other specified complication, without long-term current use of insulin    3. Essential hypertension        Plan:       Dm- check a1c, microalb  Urinary frequency - check ua/cx     htn- stable on medication

## 2020-08-26 LAB — HCV AB SERPL QL IA: NEGATIVE

## 2020-08-26 RX ORDER — METFORMIN HYDROCHLORIDE 500 MG/1
500 TABLET, EXTENDED RELEASE ORAL 2 TIMES DAILY WITH MEALS
Qty: 180 TABLET | Refills: 3 | Status: SHIPPED | OUTPATIENT
Start: 2020-08-26 | End: 2020-11-05

## 2020-08-27 ENCOUNTER — PATIENT OUTREACH (OUTPATIENT)
Dept: ADMINISTRATIVE | Facility: OTHER | Age: 75
End: 2020-08-27

## 2020-08-27 NOTE — PROGRESS NOTES
Health Maintenance Due   Topic Date Due    TETANUS VACCINE  09/23/1963    Shingles Vaccine (1 of 2) 09/23/1994    Pneumococcal Vaccine (65+ Low/Medium Risk) (2 of 2 - PPSV23) 11/08/2019    Eye Exam  07/18/2020     Updates were requested from care everywhere.  Chart was reviewed for overdue Proactive [facility] Encounters ([facility]) topics (CRS, Breast Cancer Screening, Eye exam)  Health Maintenance has been updated.  LINKS immunization registry triggered.  Immunizations were reconciled.

## 2020-09-01 RX ORDER — OLMESARTAN MEDOXOMIL 40 MG/1
40 TABLET ORAL DAILY
Qty: 90 TABLET | Refills: 3 | Status: SHIPPED | OUTPATIENT
Start: 2020-09-01 | End: 2021-12-24

## 2020-09-03 ENCOUNTER — PATIENT OUTREACH (OUTPATIENT)
Dept: OTHER | Facility: OTHER | Age: 75
End: 2020-09-03

## 2020-09-04 ENCOUNTER — PATIENT OUTREACH (OUTPATIENT)
Dept: OTHER | Facility: OTHER | Age: 75
End: 2020-09-04

## 2020-09-04 NOTE — PROGRESS NOTES
Digital Medicine: Health  Follow-Up    Patient returned missed call from yesterday. Doing well with no questions/comments/concerns. States nothing has changed in terms of lifestyle.    Patient had questions regarding several [facility] bills - Transferred patient to Billing Customer Service for further assistance.    Patient has my number and knows to call back if needed. Will continue to monitor and f/u as scheduled.                       Addressed any questions or concerns and patient has my contact information if needed prior to next outreach. Patient verbalizes understanding.      Explained the importance of self-monitoring and medication adherence. Encouraged the patient to communicate with their health  for lifestyle modifications to help improve or maintain a healthy lifestyle.                Topic    Eye Exam        Last 5 Patient Entered Readings                                      Current 30 Day Average: 149/76     Recent Readings 8/31/2020 8/31/2020 8/31/2020 8/31/2020 8/18/2020    SBP (mmHg) 156 149 169 158 149    DBP (mmHg) 81 72 77 85 79    Pulse 79 80 79 87 82

## 2020-09-04 NOTE — PROGRESS NOTES
"Digital Medicine: Clinician Follow-Up    Called patient to follow up on HDMP. She states that she rushes to check her BP which may be the cause of her elevated home BP readings. She reports that she also waits about a half hour after taking her medications to check her BP. She states that her old BP cuff had a "bubble in the blood pressure cuff because it was too small" so she switched the cuff and got a larger cuff. She states that she has noticed a significant change in her strength and energy after her heart attack last year and she is slowly regaining her strength. She reports that she walks 2 blocks in her neighborhood. She states that she prefers to walk outside because she can listen to her music and get fresh air. Of notem BP reading in office last week was 128/70 mmHg and when her nurse came to her house, her BP reading was "129/80 something".     The history is provided by the patient.     Completed Medication Reconciliation    Follow-up reason(s): routine follow up.     Hypertension    Readings are trending up   Patient is not experiencing signs/symptoms of hypotension.  Patient is not experiencing signs/symptoms of hypertension.          Last 5 Patient Entered Readings                                      Current 30 Day Average: 149/76     Recent Readings 8/31/2020 8/31/2020 8/31/2020 8/31/2020 8/18/2020    SBP (mmHg) 156 149 169 158 149    DBP (mmHg) 81 72 77 85 79    Pulse 79 80 79 87 82            Depression Screening  Did not address depression screening.    Sleep Apnea Screening    Did not address sleep apnea screening.     Medication Affordability Screening  Did not address medication affordability screening.     Medication Adherence-Medication adherence was assessed.          ASSESSMENT(S)  Patients BP average is 149/76 mmHg, which is above goal. Patient's BP goal is less than or equal to 130/80 per 2017 ACC/AHA Hypertension Guidelines.     HTN is appropriately managed with 2 first line " antihypertensive agents - ARB and thiazide diuretic. HTN management is likely limited due to a faulty cuff or inaccurate technique.      Hypertension Plan  Additional monitoring needed.  Continue current therapy.  Continue current diet/physical activity routine. Provided patient with information on steps to walk a mile video that she can do inside her home on days when she is unable to go outside. Encouraged patient to continue to add distance to her walks outdoors. For example, if she walks 2 blocks today, then she should try walking 3 blocks in the next few days and build upon that.  Instructed to charge device. Informed patient to charge her BP cuff again this weekend and check her BP on Monday. If BP remains elevated, will consider checking the integrity of the cuff at the [facility].  Provided patient education.  Informed patient to proper BP measurement techniques and encouraged her to adhere to techniques to ensure adequate BP readings. Informed patient to continue to check her BP at least 1 hour after taking her antihypertensive medications. She is to sit for at least 5 minutes prior to checking her BP.      Addressed any questions or concerns and patient has my contact information if needed prior to next outreach. Patient verbalizes understanding.      Explained the importance of self-monitoring and medication adherence. Encouraged the patient to communicate with their health  for lifestyle modifications to help improve or maintain a healthy lifestyle.                  Topic    Eye Exam          Hypertension Medications             amLODIPine (NORVASC) 10 MG tablet Take 1 tablet (10 mg total) by mouth once daily.    olmesartan (BENICAR) 40 MG tablet Take 1 tablet (40 mg total) by mouth once daily.

## 2020-09-24 ENCOUNTER — PATIENT OUTREACH (OUTPATIENT)
Dept: OTHER | Facility: OTHER | Age: 75
End: 2020-09-24

## 2020-09-25 NOTE — PROGRESS NOTES
Patient called back to mention that she was having complications with her phone that required a repair. She reports that she won't be able to get her phone repaired until her next pay day. She requests for a follow up after then. Call was cut short due to connectivity issues. Called patient back and left a voicemail.

## 2020-10-05 ENCOUNTER — PATIENT OUTREACH (OUTPATIENT)
Dept: OTHER | Facility: OTHER | Age: 75
End: 2020-10-05

## 2020-10-05 NOTE — PROGRESS NOTES
Digital Medicine: Health  Follow-Up    The history is provided by the patient.                 Patient needs assistance troubleshooting: Having problems w/ her phone - needs to get it fixed. Unable to take BP readings until she gets it fixed. .      Topics Covered on Call: device use    Additional Follow-up details: Patient feels confident with BP readings and current lifestyle routine - Plans to continue as he has been.   Patient has my number and knows to call if needed regarding lifestyle or setting up device to her new phone.     Will continue to monitor and f/u.          Diet-Not assessed          Physical Activity-Not assessed    Medication Adherence-Medication adherence was assessed.      Substance, Sleep, Stress-Not assessed      Additional monitoring needed. Patient is unable to submit BP readings until she gets her phone fixed.   Continue current diet/physical activity routine.       Addressed patient questions and patient has my contact information if needed prior to next outreach. Patient verbalizes understanding.      Explained the importance of self-monitoring and medication adherence. Encouraged the patient to communicate with their health  for lifestyle modifications to help improve or maintain a healthy lifestyle.                Topic    Eye Exam        Last 5 Patient Entered Readings                                      Current 30 Day Average: 154/79     Recent Readings 9/25/2020 9/15/2020 9/15/2020 9/15/2020 9/15/2020    SBP (mmHg) 176 160 155 159 159    DBP (mmHg) 93 83 78 84 83    Pulse 82 79 76 76 77

## 2020-10-16 ENCOUNTER — PATIENT OUTREACH (OUTPATIENT)
Dept: OTHER | Facility: OTHER | Age: 75
End: 2020-10-16

## 2020-10-16 NOTE — PROGRESS NOTES
"Digital Medicine: Clinician Follow-Up    Called patient to follow up on HDMP. She reports that she has discovered that she does not sit long enough. She reports that she is now taking her medications early in the morning and waits a couple of hours prior to checking her blood pressure.    During medication reconciliation she states that she now takes metformin every other day because her "side hurts when lying down" when she takes metformin on a consistent basis.     The history is provided by the patient.   Follow-up reason(s): routine follow up.     Hypertension    Patient's blood pressure is stable.   Patient is not experiencing signs/symptoms of hypotension.  Patient is not experiencing signs/symptoms of hypertension.        Last 5 Patient Entered Readings                                      Current 30 Day Average: 163/78     Recent Readings 10/13/2020 10/13/2020 10/6/2020 10/6/2020 10/6/2020    SBP (mmHg) 146 158 168 154 160    DBP (mmHg) 78 71 81 78 76    Pulse 84 80 75 71 75          Depression Screening  Did not address depression screening.    Sleep Apnea Screening    Did not address sleep apnea screening.     Medication Affordability Screening  Did not address medication affordability screening.     Medication Adherence-Medication adherence was assessed.          ASSESSMENT(S)  Patients BP average is 163/78 mmHg, which is above goal. Patient's BP goal is less than or equal to 130/80.     HTN is appropriately managed with first line antihypertensive agents - DHP CCB and ARB. HTN management is limited by improper technique.       Hypertension Plan  Additional monitoring needed. In future, if BP remains elevated will change amlodipine 10 mg to nifedipine 60 mg for additional BP lowering.  Continue current therapy.  Continue current diet/physical activity routine.  Instructed to charge device.  Await MD intervention. Will consult patient's PCP regarding metformin usage.  Reviewed lab results from August 2020 " with patient.     Addressed patient questions and patient has my contact information if needed prior to next outreach. Patient verbalizes understanding.      Explained the importance of self-monitoring and medication adherence. Encouraged the patient to communicate with their health  for lifestyle modifications to help improve or maintain a healthy lifestyle.                   Topic    Eye Exam      There are no preventive care reminders to display for this patient.      Hypertension Medications             amLODIPine (NORVASC) 10 MG tablet TAKE 1 TABLET BY MOUTH EVERY DAY    olmesartan (BENICAR) 40 MG tablet Take 1 tablet (40 mg total) by mouth once daily.

## 2020-10-30 ENCOUNTER — PATIENT OUTREACH (OUTPATIENT)
Dept: ADMINISTRATIVE | Facility: OTHER | Age: 75
End: 2020-10-30

## 2020-11-05 ENCOUNTER — PATIENT OUTREACH (OUTPATIENT)
Dept: OTHER | Facility: OTHER | Age: 75
End: 2020-11-05

## 2021-03-03 ENCOUNTER — OFFICE VISIT (OUTPATIENT)
Dept: FAMILY MEDICINE | Facility: CLINIC | Age: 76
End: 2021-03-03
Payer: MEDICARE

## 2021-03-03 ENCOUNTER — LAB VISIT (OUTPATIENT)
Dept: LAB | Facility: HOSPITAL | Age: 76
End: 2021-03-03
Attending: INTERNAL MEDICINE
Payer: MEDICARE

## 2021-03-03 VITALS
HEART RATE: 64 BPM | TEMPERATURE: 98 F | OXYGEN SATURATION: 100 % | DIASTOLIC BLOOD PRESSURE: 70 MMHG | WEIGHT: 196.63 LBS | SYSTOLIC BLOOD PRESSURE: 138 MMHG | BODY MASS INDEX: 35.97 KG/M2

## 2021-03-03 DIAGNOSIS — E11.69 TYPE 2 DIABETES MELLITUS WITH OTHER SPECIFIED COMPLICATION, WITHOUT LONG-TERM CURRENT USE OF INSULIN: ICD-10-CM

## 2021-03-03 DIAGNOSIS — I10 ESSENTIAL HYPERTENSION: ICD-10-CM

## 2021-03-03 DIAGNOSIS — E66.01 MORBID (SEVERE) OBESITY DUE TO EXCESS CALORIES: ICD-10-CM

## 2021-03-03 DIAGNOSIS — I50.30 DIASTOLIC CONGESTIVE HEART FAILURE, UNSPECIFIED HF CHRONICITY: ICD-10-CM

## 2021-03-03 DIAGNOSIS — I77.1 STRICTURE OF ARTERY: ICD-10-CM

## 2021-03-03 DIAGNOSIS — I20.89 OTHER FORMS OF ANGINA PECTORIS: ICD-10-CM

## 2021-03-03 DIAGNOSIS — N23 KIDNEY PAIN: Primary | ICD-10-CM

## 2021-03-03 LAB
ALBUMIN SERPL BCP-MCNC: 3.5 G/DL (ref 3.5–5.2)
ALP SERPL-CCNC: 241 U/L (ref 55–135)
ALT SERPL W/O P-5'-P-CCNC: 21 U/L (ref 10–44)
ANION GAP SERPL CALC-SCNC: 11 MMOL/L (ref 8–16)
AST SERPL-CCNC: 17 U/L (ref 10–40)
BILIRUB SERPL-MCNC: 0.3 MG/DL (ref 0.1–1)
BUN SERPL-MCNC: 10 MG/DL (ref 8–23)
CALCIUM SERPL-MCNC: 9.6 MG/DL (ref 8.7–10.5)
CHLORIDE SERPL-SCNC: 104 MMOL/L (ref 95–110)
CO2 SERPL-SCNC: 25 MMOL/L (ref 23–29)
CREAT SERPL-MCNC: 0.8 MG/DL (ref 0.5–1.4)
EST. GFR  (AFRICAN AMERICAN): >60 ML/MIN/1.73 M^2
EST. GFR  (NON AFRICAN AMERICAN): >60 ML/MIN/1.73 M^2
ESTIMATED AVG GLUCOSE: 214 MG/DL (ref 68–131)
GLUCOSE SERPL-MCNC: 205 MG/DL (ref 70–110)
HBA1C MFR BLD: 9.1 % (ref 4–5.6)
POTASSIUM SERPL-SCNC: 4.2 MMOL/L (ref 3.5–5.1)
PROT SERPL-MCNC: 7.7 G/DL (ref 6–8.4)
SODIUM SERPL-SCNC: 140 MMOL/L (ref 136–145)

## 2021-03-03 PROCEDURE — 99499 RISK ADDL DX/OHS AUDIT: ICD-10-PCS | Mod: S$GLB,,, | Performed by: INTERNAL MEDICINE

## 2021-03-03 PROCEDURE — 1159F MED LIST DOCD IN RCRD: CPT | Mod: S$GLB,,, | Performed by: INTERNAL MEDICINE

## 2021-03-03 PROCEDURE — 3078F DIAST BP <80 MM HG: CPT | Mod: CPTII,S$GLB,, | Performed by: INTERNAL MEDICINE

## 2021-03-03 PROCEDURE — 3288F FALL RISK ASSESSMENT DOCD: CPT | Mod: CPTII,S$GLB,, | Performed by: INTERNAL MEDICINE

## 2021-03-03 PROCEDURE — 99214 OFFICE O/P EST MOD 30 MIN: CPT | Mod: S$GLB,,, | Performed by: INTERNAL MEDICINE

## 2021-03-03 PROCEDURE — 1159F PR MEDICATION LIST DOCUMENTED IN MEDICAL RECORD: ICD-10-PCS | Mod: S$GLB,,, | Performed by: INTERNAL MEDICINE

## 2021-03-03 PROCEDURE — 99999 PR PBB SHADOW E&M-EST. PATIENT-LVL III: ICD-10-PCS | Mod: PBBFAC,,, | Performed by: INTERNAL MEDICINE

## 2021-03-03 PROCEDURE — 36415 COLL VENOUS BLD VENIPUNCTURE: CPT | Mod: PO | Performed by: INTERNAL MEDICINE

## 2021-03-03 PROCEDURE — 3288F PR FALLS RISK ASSESSMENT DOCUMENTED: ICD-10-PCS | Mod: CPTII,S$GLB,, | Performed by: INTERNAL MEDICINE

## 2021-03-03 PROCEDURE — 3078F PR MOST RECENT DIASTOLIC BLOOD PRESSURE < 80 MM HG: ICD-10-PCS | Mod: CPTII,S$GLB,, | Performed by: INTERNAL MEDICINE

## 2021-03-03 PROCEDURE — 3075F PR MOST RECENT SYSTOLIC BLOOD PRESS GE 130-139MM HG: ICD-10-PCS | Mod: CPTII,S$GLB,, | Performed by: INTERNAL MEDICINE

## 2021-03-03 PROCEDURE — 99214 PR OFFICE/OUTPT VISIT, EST, LEVL IV, 30-39 MIN: ICD-10-PCS | Mod: S$GLB,,, | Performed by: INTERNAL MEDICINE

## 2021-03-03 PROCEDURE — 3051F PR MOST RECENT HEMOGLOBIN A1C LEVEL 7.0 - < 8.0%: ICD-10-PCS | Mod: CPTII,S$GLB,, | Performed by: INTERNAL MEDICINE

## 2021-03-03 PROCEDURE — 1101F PT FALLS ASSESS-DOCD LE1/YR: CPT | Mod: CPTII,S$GLB,, | Performed by: INTERNAL MEDICINE

## 2021-03-03 PROCEDURE — 83036 HEMOGLOBIN GLYCOSYLATED A1C: CPT | Performed by: INTERNAL MEDICINE

## 2021-03-03 PROCEDURE — 99499 UNLISTED E&M SERVICE: CPT | Mod: S$GLB,,, | Performed by: INTERNAL MEDICINE

## 2021-03-03 PROCEDURE — 99999 PR PBB SHADOW E&M-EST. PATIENT-LVL III: CPT | Mod: PBBFAC,,, | Performed by: INTERNAL MEDICINE

## 2021-03-03 PROCEDURE — 3075F SYST BP GE 130 - 139MM HG: CPT | Mod: CPTII,S$GLB,, | Performed by: INTERNAL MEDICINE

## 2021-03-03 PROCEDURE — 1101F PR PT FALLS ASSESS DOC 0-1 FALLS W/OUT INJ PAST YR: ICD-10-PCS | Mod: CPTII,S$GLB,, | Performed by: INTERNAL MEDICINE

## 2021-03-03 PROCEDURE — 80053 COMPREHEN METABOLIC PANEL: CPT | Performed by: INTERNAL MEDICINE

## 2021-03-03 PROCEDURE — 3051F HG A1C>EQUAL 7.0%<8.0%: CPT | Mod: CPTII,S$GLB,, | Performed by: INTERNAL MEDICINE

## 2021-03-03 RX ORDER — OXYBUTYNIN CHLORIDE 5 MG/1
5 TABLET, EXTENDED RELEASE ORAL DAILY
Qty: 30 TABLET | Refills: 11 | Status: SHIPPED | OUTPATIENT
Start: 2021-03-03 | End: 2022-05-30

## 2021-03-03 RX ORDER — PRAVASTATIN SODIUM 40 MG/1
TABLET ORAL
COMMUNITY
End: 2021-12-24 | Stop reason: ALTCHOICE

## 2021-03-04 RX ORDER — GLIMEPIRIDE 2 MG/1
2 TABLET ORAL
Qty: 90 TABLET | Refills: 3 | Status: SHIPPED | OUTPATIENT
Start: 2021-03-04 | End: 2021-12-09

## 2021-03-11 ENCOUNTER — HOSPITAL ENCOUNTER (OUTPATIENT)
Dept: RADIOLOGY | Facility: HOSPITAL | Age: 76
Discharge: HOME | End: 2021-03-11
Attending: INTERNAL MEDICINE
Payer: MEDICARE

## 2021-03-11 DIAGNOSIS — N23 KIDNEY PAIN: ICD-10-CM

## 2021-03-11 PROCEDURE — 76770 US EXAM ABDO BACK WALL COMP: CPT | Mod: 26,,, | Performed by: RADIOLOGY

## 2021-03-11 PROCEDURE — 76770 US EXAM ABDO BACK WALL COMP: CPT | Mod: TC,PO

## 2021-03-11 PROCEDURE — 76770 US RETROPERITONEAL COMPLETE: ICD-10-PCS | Mod: 26,,, | Performed by: RADIOLOGY

## 2021-03-17 ENCOUNTER — TELEPHONE (OUTPATIENT)
Dept: ORTHOPEDICS | Facility: CLINIC | Age: 76
End: 2021-03-17

## 2021-03-19 ENCOUNTER — TELEPHONE (OUTPATIENT)
Dept: FAMILY MEDICINE | Facility: CLINIC | Age: 76
End: 2021-03-19

## 2021-03-23 ENCOUNTER — TELEPHONE (OUTPATIENT)
Dept: FAMILY MEDICINE | Facility: CLINIC | Age: 76
End: 2021-03-23

## 2021-03-24 ENCOUNTER — TELEPHONE (OUTPATIENT)
Dept: FAMILY MEDICINE | Facility: CLINIC | Age: 76
End: 2021-03-24

## 2021-03-24 DIAGNOSIS — I77.1 STRICTURE OF ARTERY: Primary | ICD-10-CM

## 2021-04-06 DIAGNOSIS — M17.0 BILATERAL PRIMARY OSTEOARTHRITIS OF KNEE: Primary | ICD-10-CM

## 2021-04-08 ENCOUNTER — HOSPITAL ENCOUNTER (OUTPATIENT)
Dept: RADIOLOGY | Facility: HOSPITAL | Age: 76
Discharge: HOME | End: 2021-04-08
Attending: ORTHOPAEDIC SURGERY
Payer: MEDICARE

## 2021-04-08 ENCOUNTER — OFFICE VISIT (OUTPATIENT)
Dept: ORTHOPEDICS | Facility: CLINIC | Age: 76
End: 2021-04-08
Payer: MEDICARE

## 2021-04-08 VITALS
DIASTOLIC BLOOD PRESSURE: 81 MMHG | WEIGHT: 196 LBS | SYSTOLIC BLOOD PRESSURE: 185 MMHG | HEART RATE: 97 BPM | HEIGHT: 62 IN | BODY MASS INDEX: 36.07 KG/M2

## 2021-04-08 DIAGNOSIS — M17.0 BILATERAL PRIMARY OSTEOARTHRITIS OF KNEE: Primary | ICD-10-CM

## 2021-04-08 DIAGNOSIS — M17.0 BILATERAL PRIMARY OSTEOARTHRITIS OF KNEE: ICD-10-CM

## 2021-04-08 PROCEDURE — 3288F PR FALLS RISK ASSESSMENT DOCUMENTED: ICD-10-PCS | Mod: CPTII,S$GLB,, | Performed by: ORTHOPAEDIC SURGERY

## 2021-04-08 PROCEDURE — 99999 PR PBB SHADOW E&M-EST. PATIENT-LVL III: CPT | Mod: PBBFAC,,, | Performed by: ORTHOPAEDIC SURGERY

## 2021-04-08 PROCEDURE — 1101F PR PT FALLS ASSESS DOC 0-1 FALLS W/OUT INJ PAST YR: ICD-10-PCS | Mod: CPTII,S$GLB,, | Performed by: ORTHOPAEDIC SURGERY

## 2021-04-08 PROCEDURE — 3288F FALL RISK ASSESSMENT DOCD: CPT | Mod: CPTII,S$GLB,, | Performed by: ORTHOPAEDIC SURGERY

## 2021-04-08 PROCEDURE — 1125F PR PAIN SEVERITY QUANTIFIED, PAIN PRESENT: ICD-10-PCS | Mod: S$GLB,,, | Performed by: ORTHOPAEDIC SURGERY

## 2021-04-08 PROCEDURE — 1159F MED LIST DOCD IN RCRD: CPT | Mod: S$GLB,,, | Performed by: ORTHOPAEDIC SURGERY

## 2021-04-08 PROCEDURE — 1159F PR MEDICATION LIST DOCUMENTED IN MEDICAL RECORD: ICD-10-PCS | Mod: S$GLB,,, | Performed by: ORTHOPAEDIC SURGERY

## 2021-04-08 PROCEDURE — 99214 OFFICE O/P EST MOD 30 MIN: CPT | Mod: S$GLB,,, | Performed by: ORTHOPAEDIC SURGERY

## 2021-04-08 PROCEDURE — 1125F AMNT PAIN NOTED PAIN PRSNT: CPT | Mod: S$GLB,,, | Performed by: ORTHOPAEDIC SURGERY

## 2021-04-08 PROCEDURE — 99999 PR PBB SHADOW E&M-EST. PATIENT-LVL III: ICD-10-PCS | Mod: PBBFAC,,, | Performed by: ORTHOPAEDIC SURGERY

## 2021-04-08 PROCEDURE — 99214 PR OFFICE/OUTPT VISIT, EST, LEVL IV, 30-39 MIN: ICD-10-PCS | Mod: S$GLB,,, | Performed by: ORTHOPAEDIC SURGERY

## 2021-04-08 PROCEDURE — 1101F PT FALLS ASSESS-DOCD LE1/YR: CPT | Mod: CPTII,S$GLB,, | Performed by: ORTHOPAEDIC SURGERY

## 2021-04-08 RX ORDER — MELOXICAM 15 MG/1
15 TABLET ORAL DAILY
Qty: 30 TABLET | Refills: 11 | Status: SHIPPED | OUTPATIENT
Start: 2021-04-08 | End: 2022-05-09

## 2021-04-08 RX ORDER — METHOCARBAMOL 750 MG/1
750 TABLET, FILM COATED ORAL 4 TIMES DAILY PRN
Qty: 44 TABLET | Refills: 0 | Status: SHIPPED | OUTPATIENT
Start: 2021-04-08 | End: 2021-06-15 | Stop reason: SDUPTHER

## 2021-05-19 ENCOUNTER — PATIENT OUTREACH (OUTPATIENT)
Dept: ADMINISTRATIVE | Facility: HOSPITAL | Age: 76
End: 2021-05-19

## 2021-05-19 ENCOUNTER — PATIENT MESSAGE (OUTPATIENT)
Dept: ADMINISTRATIVE | Facility: HOSPITAL | Age: 76
End: 2021-05-19

## 2021-06-03 ENCOUNTER — OFFICE VISIT (OUTPATIENT)
Dept: FAMILY MEDICINE | Facility: CLINIC | Age: 76
End: 2021-06-03
Payer: COMMERCIAL

## 2021-06-03 ENCOUNTER — LAB VISIT (OUTPATIENT)
Dept: LAB | Facility: HOSPITAL | Age: 76
End: 2021-06-03
Attending: INTERNAL MEDICINE
Payer: COMMERCIAL

## 2021-06-03 VITALS
HEIGHT: 62 IN | DIASTOLIC BLOOD PRESSURE: 74 MMHG | OXYGEN SATURATION: 96 % | HEART RATE: 94 BPM | WEIGHT: 197.75 LBS | BODY MASS INDEX: 36.39 KG/M2 | SYSTOLIC BLOOD PRESSURE: 138 MMHG

## 2021-06-03 DIAGNOSIS — R80.9 TYPE 2 DIABETES MELLITUS WITH MICROALBUMINURIA, WITHOUT LONG-TERM CURRENT USE OF INSULIN: ICD-10-CM

## 2021-06-03 DIAGNOSIS — I10 ESSENTIAL HYPERTENSION: ICD-10-CM

## 2021-06-03 DIAGNOSIS — E11.29 TYPE 2 DIABETES MELLITUS WITH MICROALBUMINURIA, WITHOUT LONG-TERM CURRENT USE OF INSULIN: Primary | ICD-10-CM

## 2021-06-03 DIAGNOSIS — E11.29 TYPE 2 DIABETES MELLITUS WITH MICROALBUMINURIA, WITHOUT LONG-TERM CURRENT USE OF INSULIN: ICD-10-CM

## 2021-06-03 DIAGNOSIS — E78.49 OTHER HYPERLIPIDEMIA: ICD-10-CM

## 2021-06-03 DIAGNOSIS — R80.9 TYPE 2 DIABETES MELLITUS WITH MICROALBUMINURIA, WITHOUT LONG-TERM CURRENT USE OF INSULIN: Primary | ICD-10-CM

## 2021-06-03 LAB
ESTIMATED AVG GLUCOSE: 192 MG/DL (ref 68–131)
HBA1C MFR BLD: 8.3 % (ref 4–5.6)

## 2021-06-03 PROCEDURE — 3288F FALL RISK ASSESSMENT DOCD: CPT | Mod: CPTII,S$GLB,, | Performed by: INTERNAL MEDICINE

## 2021-06-03 PROCEDURE — 3078F PR MOST RECENT DIASTOLIC BLOOD PRESSURE < 80 MM HG: ICD-10-PCS | Mod: CPTII,S$GLB,, | Performed by: INTERNAL MEDICINE

## 2021-06-03 PROCEDURE — 1126F AMNT PAIN NOTED NONE PRSNT: CPT | Mod: S$GLB,,, | Performed by: INTERNAL MEDICINE

## 2021-06-03 PROCEDURE — 1126F PR PAIN SEVERITY QUANTIFIED, NO PAIN PRESENT: ICD-10-PCS | Mod: S$GLB,,, | Performed by: INTERNAL MEDICINE

## 2021-06-03 PROCEDURE — 3078F DIAST BP <80 MM HG: CPT | Mod: CPTII,S$GLB,, | Performed by: INTERNAL MEDICINE

## 2021-06-03 PROCEDURE — 99499 RISK ADDL DX/OHS AUDIT: ICD-10-PCS | Mod: S$GLB,,, | Performed by: INTERNAL MEDICINE

## 2021-06-03 PROCEDURE — 99499 UNLISTED E&M SERVICE: CPT | Mod: S$GLB,,, | Performed by: INTERNAL MEDICINE

## 2021-06-03 PROCEDURE — 3288F PR FALLS RISK ASSESSMENT DOCUMENTED: ICD-10-PCS | Mod: CPTII,S$GLB,, | Performed by: INTERNAL MEDICINE

## 2021-06-03 PROCEDURE — 3075F SYST BP GE 130 - 139MM HG: CPT | Mod: CPTII,S$GLB,, | Performed by: INTERNAL MEDICINE

## 2021-06-03 PROCEDURE — 3075F PR MOST RECENT SYSTOLIC BLOOD PRESS GE 130-139MM HG: ICD-10-PCS | Mod: CPTII,S$GLB,, | Performed by: INTERNAL MEDICINE

## 2021-06-03 PROCEDURE — 1101F PR PT FALLS ASSESS DOC 0-1 FALLS W/OUT INJ PAST YR: ICD-10-PCS | Mod: CPTII,S$GLB,, | Performed by: INTERNAL MEDICINE

## 2021-06-03 PROCEDURE — 1159F PR MEDICATION LIST DOCUMENTED IN MEDICAL RECORD: ICD-10-PCS | Mod: S$GLB,,, | Performed by: INTERNAL MEDICINE

## 2021-06-03 PROCEDURE — 99214 PR OFFICE/OUTPT VISIT, EST, LEVL IV, 30-39 MIN: ICD-10-PCS | Mod: S$GLB,,, | Performed by: INTERNAL MEDICINE

## 2021-06-03 PROCEDURE — 99214 OFFICE O/P EST MOD 30 MIN: CPT | Mod: S$GLB,,, | Performed by: INTERNAL MEDICINE

## 2021-06-03 PROCEDURE — 99999 PR PBB SHADOW E&M-EST. PATIENT-LVL IV: ICD-10-PCS | Mod: PBBFAC,,, | Performed by: INTERNAL MEDICINE

## 2021-06-03 PROCEDURE — 1159F MED LIST DOCD IN RCRD: CPT | Mod: S$GLB,,, | Performed by: INTERNAL MEDICINE

## 2021-06-03 PROCEDURE — 36415 COLL VENOUS BLD VENIPUNCTURE: CPT | Mod: PO | Performed by: INTERNAL MEDICINE

## 2021-06-03 PROCEDURE — 1101F PT FALLS ASSESS-DOCD LE1/YR: CPT | Mod: CPTII,S$GLB,, | Performed by: INTERNAL MEDICINE

## 2021-06-03 PROCEDURE — 83036 HEMOGLOBIN GLYCOSYLATED A1C: CPT | Performed by: INTERNAL MEDICINE

## 2021-06-03 PROCEDURE — 99999 PR PBB SHADOW E&M-EST. PATIENT-LVL IV: CPT | Mod: PBBFAC,,, | Performed by: INTERNAL MEDICINE

## 2021-06-15 ENCOUNTER — OFFICE VISIT (OUTPATIENT)
Dept: ORTHOPEDICS | Facility: CLINIC | Age: 76
End: 2021-06-15
Payer: COMMERCIAL

## 2021-06-15 VITALS — BODY MASS INDEX: 36.25 KG/M2 | WEIGHT: 197 LBS | HEIGHT: 62 IN

## 2021-06-15 DIAGNOSIS — M17.0 BILATERAL PRIMARY OSTEOARTHRITIS OF KNEE: Primary | ICD-10-CM

## 2021-06-15 PROCEDURE — 99999 PR PBB SHADOW E&M-EST. PATIENT-LVL III: ICD-10-PCS | Mod: PBBFAC,,, | Performed by: ORTHOPAEDIC SURGERY

## 2021-06-15 PROCEDURE — 1159F PR MEDICATION LIST DOCUMENTED IN MEDICAL RECORD: ICD-10-PCS | Mod: S$GLB,,, | Performed by: ORTHOPAEDIC SURGERY

## 2021-06-15 PROCEDURE — 3288F FALL RISK ASSESSMENT DOCD: CPT | Mod: CPTII,S$GLB,, | Performed by: ORTHOPAEDIC SURGERY

## 2021-06-15 PROCEDURE — 20610 DRAIN/INJ JOINT/BURSA W/O US: CPT | Mod: 50,S$GLB,, | Performed by: ORTHOPAEDIC SURGERY

## 2021-06-15 PROCEDURE — 1101F PR PT FALLS ASSESS DOC 0-1 FALLS W/OUT INJ PAST YR: ICD-10-PCS | Mod: CPTII,S$GLB,, | Performed by: ORTHOPAEDIC SURGERY

## 2021-06-15 PROCEDURE — 1125F PR PAIN SEVERITY QUANTIFIED, PAIN PRESENT: ICD-10-PCS | Mod: S$GLB,,, | Performed by: ORTHOPAEDIC SURGERY

## 2021-06-15 PROCEDURE — 20610 LARGE JOINT ASPIRATION/INJECTION: BILATERAL KNEE: ICD-10-PCS | Mod: 50,S$GLB,, | Performed by: ORTHOPAEDIC SURGERY

## 2021-06-15 PROCEDURE — 99214 PR OFFICE/OUTPT VISIT, EST, LEVL IV, 30-39 MIN: ICD-10-PCS | Mod: 25,S$GLB,, | Performed by: ORTHOPAEDIC SURGERY

## 2021-06-15 PROCEDURE — 3288F PR FALLS RISK ASSESSMENT DOCUMENTED: ICD-10-PCS | Mod: CPTII,S$GLB,, | Performed by: ORTHOPAEDIC SURGERY

## 2021-06-15 PROCEDURE — 1101F PT FALLS ASSESS-DOCD LE1/YR: CPT | Mod: CPTII,S$GLB,, | Performed by: ORTHOPAEDIC SURGERY

## 2021-06-15 PROCEDURE — 99214 OFFICE O/P EST MOD 30 MIN: CPT | Mod: 25,S$GLB,, | Performed by: ORTHOPAEDIC SURGERY

## 2021-06-15 PROCEDURE — 99999 PR PBB SHADOW E&M-EST. PATIENT-LVL III: CPT | Mod: PBBFAC,,, | Performed by: ORTHOPAEDIC SURGERY

## 2021-06-15 PROCEDURE — 1125F AMNT PAIN NOTED PAIN PRSNT: CPT | Mod: S$GLB,,, | Performed by: ORTHOPAEDIC SURGERY

## 2021-06-15 PROCEDURE — 1159F MED LIST DOCD IN RCRD: CPT | Mod: S$GLB,,, | Performed by: ORTHOPAEDIC SURGERY

## 2021-06-15 RX ORDER — TRIAMCINOLONE ACETONIDE 40 MG/ML
40 INJECTION, SUSPENSION INTRA-ARTICULAR; INTRAMUSCULAR
Status: DISCONTINUED | OUTPATIENT
Start: 2021-06-15 | End: 2021-06-15 | Stop reason: HOSPADM

## 2021-06-15 RX ORDER — METHOCARBAMOL 750 MG/1
750 TABLET, FILM COATED ORAL 4 TIMES DAILY PRN
Qty: 44 TABLET | Refills: 3 | Status: SHIPPED | OUTPATIENT
Start: 2021-06-15 | End: 2021-11-16 | Stop reason: SDUPTHER

## 2021-06-15 RX ADMIN — TRIAMCINOLONE ACETONIDE 40 MG: 40 INJECTION, SUSPENSION INTRA-ARTICULAR; INTRAMUSCULAR at 09:06

## 2021-09-07 ENCOUNTER — OFFICE VISIT (OUTPATIENT)
Dept: FAMILY MEDICINE | Facility: CLINIC | Age: 76
End: 2021-09-07
Payer: MEDICARE

## 2021-09-07 ENCOUNTER — LAB VISIT (OUTPATIENT)
Dept: LAB | Facility: HOSPITAL | Age: 76
End: 2021-09-07
Attending: INTERNAL MEDICINE
Payer: MEDICARE

## 2021-09-07 VITALS
BODY MASS INDEX: 37.06 KG/M2 | WEIGHT: 202.63 LBS | SYSTOLIC BLOOD PRESSURE: 130 MMHG | OXYGEN SATURATION: 100 % | DIASTOLIC BLOOD PRESSURE: 79 MMHG | HEART RATE: 66 BPM

## 2021-09-07 DIAGNOSIS — E78.49 OTHER HYPERLIPIDEMIA: ICD-10-CM

## 2021-09-07 DIAGNOSIS — E11.29 TYPE 2 DIABETES MELLITUS WITH MICROALBUMINURIA, WITHOUT LONG-TERM CURRENT USE OF INSULIN: ICD-10-CM

## 2021-09-07 DIAGNOSIS — R80.9 TYPE 2 DIABETES MELLITUS WITH MICROALBUMINURIA, WITHOUT LONG-TERM CURRENT USE OF INSULIN: Primary | ICD-10-CM

## 2021-09-07 DIAGNOSIS — R80.9 TYPE 2 DIABETES MELLITUS WITH MICROALBUMINURIA, WITHOUT LONG-TERM CURRENT USE OF INSULIN: ICD-10-CM

## 2021-09-07 DIAGNOSIS — E11.29 TYPE 2 DIABETES MELLITUS WITH MICROALBUMINURIA, WITHOUT LONG-TERM CURRENT USE OF INSULIN: Primary | ICD-10-CM

## 2021-09-07 DIAGNOSIS — I10 ESSENTIAL HYPERTENSION: ICD-10-CM

## 2021-09-07 LAB
ALBUMIN SERPL BCP-MCNC: 3.7 G/DL (ref 3.5–5.2)
ALP SERPL-CCNC: 188 U/L (ref 55–135)
ALT SERPL W/O P-5'-P-CCNC: 12 U/L (ref 10–44)
ANION GAP SERPL CALC-SCNC: 9 MMOL/L (ref 8–16)
AST SERPL-CCNC: 15 U/L (ref 10–40)
BILIRUB SERPL-MCNC: 0.4 MG/DL (ref 0.1–1)
BUN SERPL-MCNC: 15 MG/DL (ref 8–23)
CALCIUM SERPL-MCNC: 9.6 MG/DL (ref 8.7–10.5)
CHLORIDE SERPL-SCNC: 103 MMOL/L (ref 95–110)
CHOLEST SERPL-MCNC: 172 MG/DL (ref 120–199)
CHOLEST/HDLC SERPL: 3.9 {RATIO} (ref 2–5)
CO2 SERPL-SCNC: 24 MMOL/L (ref 23–29)
CREAT SERPL-MCNC: 0.8 MG/DL (ref 0.5–1.4)
EST. GFR  (AFRICAN AMERICAN): >60 ML/MIN/1.73 M^2
EST. GFR  (NON AFRICAN AMERICAN): >60 ML/MIN/1.73 M^2
ESTIMATED AVG GLUCOSE: 166 MG/DL (ref 68–131)
GLUCOSE SERPL-MCNC: 130 MG/DL (ref 70–110)
HBA1C MFR BLD: 7.4 % (ref 4–5.6)
HDLC SERPL-MCNC: 44 MG/DL (ref 40–75)
HDLC SERPL: 25.6 % (ref 20–50)
LDLC SERPL CALC-MCNC: 113.6 MG/DL (ref 63–159)
NONHDLC SERPL-MCNC: 128 MG/DL
POTASSIUM SERPL-SCNC: 4 MMOL/L (ref 3.5–5.1)
PROT SERPL-MCNC: 7.3 G/DL (ref 6–8.4)
SODIUM SERPL-SCNC: 136 MMOL/L (ref 136–145)
TRIGL SERPL-MCNC: 72 MG/DL (ref 30–150)

## 2021-09-07 PROCEDURE — 83036 HEMOGLOBIN GLYCOSYLATED A1C: CPT | Performed by: INTERNAL MEDICINE

## 2021-09-07 PROCEDURE — 85025 COMPLETE CBC W/AUTO DIFF WBC: CPT | Performed by: INTERNAL MEDICINE

## 2021-09-07 PROCEDURE — 99999 PR PBB SHADOW E&M-EST. PATIENT-LVL III: CPT | Mod: PBBFAC,,, | Performed by: INTERNAL MEDICINE

## 2021-09-07 PROCEDURE — 99999 PR PBB SHADOW E&M-EST. PATIENT-LVL III: ICD-10-PCS | Mod: PBBFAC,,, | Performed by: INTERNAL MEDICINE

## 2021-09-07 PROCEDURE — 99499 UNLISTED E&M SERVICE: CPT | Mod: S$PBB,,, | Performed by: INTERNAL MEDICINE

## 2021-09-07 PROCEDURE — 80053 COMPREHEN METABOLIC PANEL: CPT | Performed by: INTERNAL MEDICINE

## 2021-09-07 PROCEDURE — 99499 RISK ADDL DX/OHS AUDIT: ICD-10-PCS | Mod: S$PBB,,, | Performed by: INTERNAL MEDICINE

## 2021-09-07 PROCEDURE — 99214 PR OFFICE/OUTPT VISIT, EST, LEVL IV, 30-39 MIN: ICD-10-PCS | Mod: S$GLB,,, | Performed by: INTERNAL MEDICINE

## 2021-09-07 PROCEDURE — 80061 LIPID PANEL: CPT | Performed by: INTERNAL MEDICINE

## 2021-09-07 PROCEDURE — 99213 OFFICE O/P EST LOW 20 MIN: CPT | Mod: PBBFAC,PO | Performed by: INTERNAL MEDICINE

## 2021-09-07 PROCEDURE — 99214 OFFICE O/P EST MOD 30 MIN: CPT | Mod: S$GLB,,, | Performed by: INTERNAL MEDICINE

## 2021-09-07 PROCEDURE — 36415 COLL VENOUS BLD VENIPUNCTURE: CPT | Mod: PO | Performed by: INTERNAL MEDICINE

## 2021-09-08 LAB
BASOPHILS # BLD AUTO: 0.05 K/UL (ref 0–0.2)
BASOPHILS NFR BLD: 0.5 % (ref 0–1.9)
DIFFERENTIAL METHOD: ABNORMAL
EOSINOPHIL # BLD AUTO: 0.7 K/UL (ref 0–0.5)
EOSINOPHIL NFR BLD: 7.6 % (ref 0–8)
ERYTHROCYTE [DISTWIDTH] IN BLOOD BY AUTOMATED COUNT: 14.9 % (ref 11.5–14.5)
HCT VFR BLD AUTO: 40.6 % (ref 37–48.5)
HGB BLD-MCNC: 13.1 G/DL (ref 12–16)
IMM GRANULOCYTES # BLD AUTO: 0.01 K/UL (ref 0–0.04)
IMM GRANULOCYTES NFR BLD AUTO: 0.1 % (ref 0–0.5)
LYMPHOCYTES # BLD AUTO: 3.8 K/UL (ref 1–4.8)
LYMPHOCYTES NFR BLD: 39.1 % (ref 18–48)
MCH RBC QN AUTO: 30.2 PG (ref 27–31)
MCHC RBC AUTO-ENTMCNC: 32.3 G/DL (ref 32–36)
MCV RBC AUTO: 94 FL (ref 82–98)
MONOCYTES # BLD AUTO: 0.8 K/UL (ref 0.3–1)
MONOCYTES NFR BLD: 8.2 % (ref 4–15)
NEUTROPHILS # BLD AUTO: 4.3 K/UL (ref 1.8–7.7)
NEUTROPHILS NFR BLD: 44.5 % (ref 38–73)
NRBC BLD-RTO: 0 /100 WBC
PLATELET # BLD AUTO: 251 K/UL (ref 150–450)
PMV BLD AUTO: 11.9 FL (ref 9.2–12.9)
RBC # BLD AUTO: 4.34 M/UL (ref 4–5.4)
WBC # BLD AUTO: 9.64 K/UL (ref 3.9–12.7)

## 2021-11-11 ENCOUNTER — TELEPHONE (OUTPATIENT)
Dept: ORTHOPEDICS | Facility: CLINIC | Age: 76
End: 2021-11-11
Payer: MEDICARE

## 2021-11-12 ENCOUNTER — TELEPHONE (OUTPATIENT)
Dept: FAMILY MEDICINE | Facility: CLINIC | Age: 76
End: 2021-11-12
Payer: MEDICARE

## 2021-11-16 ENCOUNTER — OFFICE VISIT (OUTPATIENT)
Dept: ORTHOPEDICS | Facility: CLINIC | Age: 76
End: 2021-11-16
Payer: MEDICARE

## 2021-11-16 ENCOUNTER — TELEPHONE (OUTPATIENT)
Dept: FAMILY MEDICINE | Facility: CLINIC | Age: 76
End: 2021-11-16
Payer: MEDICARE

## 2021-11-16 VITALS — WEIGHT: 202 LBS | HEIGHT: 62 IN | BODY MASS INDEX: 37.17 KG/M2

## 2021-11-16 DIAGNOSIS — R80.9 TYPE 2 DIABETES MELLITUS WITH MICROALBUMINURIA, WITHOUT LONG-TERM CURRENT USE OF INSULIN: Primary | ICD-10-CM

## 2021-11-16 DIAGNOSIS — E11.29 TYPE 2 DIABETES MELLITUS WITH MICROALBUMINURIA, WITHOUT LONG-TERM CURRENT USE OF INSULIN: Primary | ICD-10-CM

## 2021-11-16 DIAGNOSIS — M17.0 BILATERAL PRIMARY OSTEOARTHRITIS OF KNEE: Primary | ICD-10-CM

## 2021-11-16 PROCEDURE — 1160F PR REVIEW ALL MEDS BY PRESCRIBER/CLIN PHARMACIST DOCUMENTED: ICD-10-PCS | Mod: CPTII,S$GLB,, | Performed by: ORTHOPAEDIC SURGERY

## 2021-11-16 PROCEDURE — 99213 PR OFFICE/OUTPT VISIT, EST, LEVL III, 20-29 MIN: ICD-10-PCS | Mod: 25,S$GLB,, | Performed by: ORTHOPAEDIC SURGERY

## 2021-11-16 PROCEDURE — 1101F PR PT FALLS ASSESS DOC 0-1 FALLS W/OUT INJ PAST YR: ICD-10-PCS | Mod: CPTII,S$GLB,, | Performed by: ORTHOPAEDIC SURGERY

## 2021-11-16 PROCEDURE — 99999 PR PBB SHADOW E&M-EST. PATIENT-LVL III: CPT | Mod: PBBFAC,,, | Performed by: ORTHOPAEDIC SURGERY

## 2021-11-16 PROCEDURE — 1160F RVW MEDS BY RX/DR IN RCRD: CPT | Mod: CPTII,S$GLB,, | Performed by: ORTHOPAEDIC SURGERY

## 2021-11-16 PROCEDURE — 1101F PT FALLS ASSESS-DOCD LE1/YR: CPT | Mod: CPTII,S$GLB,, | Performed by: ORTHOPAEDIC SURGERY

## 2021-11-16 PROCEDURE — 20610 LARGE JOINT ASPIRATION/INJECTION: BILATERAL KNEE: ICD-10-PCS | Mod: 50,S$GLB,, | Performed by: ORTHOPAEDIC SURGERY

## 2021-11-16 PROCEDURE — 1125F AMNT PAIN NOTED PAIN PRSNT: CPT | Mod: CPTII,S$GLB,, | Performed by: ORTHOPAEDIC SURGERY

## 2021-11-16 PROCEDURE — 1159F MED LIST DOCD IN RCRD: CPT | Mod: CPTII,S$GLB,, | Performed by: ORTHOPAEDIC SURGERY

## 2021-11-16 PROCEDURE — 1159F PR MEDICATION LIST DOCUMENTED IN MEDICAL RECORD: ICD-10-PCS | Mod: CPTII,S$GLB,, | Performed by: ORTHOPAEDIC SURGERY

## 2021-11-16 PROCEDURE — 99999 PR PBB SHADOW E&M-EST. PATIENT-LVL III: ICD-10-PCS | Mod: PBBFAC,,, | Performed by: ORTHOPAEDIC SURGERY

## 2021-11-16 PROCEDURE — 3288F FALL RISK ASSESSMENT DOCD: CPT | Mod: CPTII,S$GLB,, | Performed by: ORTHOPAEDIC SURGERY

## 2021-11-16 PROCEDURE — 1125F PR PAIN SEVERITY QUANTIFIED, PAIN PRESENT: ICD-10-PCS | Mod: CPTII,S$GLB,, | Performed by: ORTHOPAEDIC SURGERY

## 2021-11-16 PROCEDURE — 20610 DRAIN/INJ JOINT/BURSA W/O US: CPT | Mod: 50,S$GLB,, | Performed by: ORTHOPAEDIC SURGERY

## 2021-11-16 PROCEDURE — 3288F PR FALLS RISK ASSESSMENT DOCUMENTED: ICD-10-PCS | Mod: CPTII,S$GLB,, | Performed by: ORTHOPAEDIC SURGERY

## 2021-11-16 PROCEDURE — 99213 OFFICE O/P EST LOW 20 MIN: CPT | Mod: 25,S$GLB,, | Performed by: ORTHOPAEDIC SURGERY

## 2021-11-16 RX ORDER — TRIAMCINOLONE ACETONIDE 40 MG/ML
40 INJECTION, SUSPENSION INTRA-ARTICULAR; INTRAMUSCULAR
Status: DISCONTINUED | OUTPATIENT
Start: 2021-11-16 | End: 2021-11-16 | Stop reason: HOSPADM

## 2021-11-16 RX ORDER — METHOCARBAMOL 750 MG/1
750 TABLET, FILM COATED ORAL 4 TIMES DAILY PRN
Qty: 44 TABLET | Refills: 3 | Status: SHIPPED | OUTPATIENT
Start: 2021-11-16 | End: 2022-02-24

## 2021-11-16 RX ADMIN — TRIAMCINOLONE ACETONIDE 40 MG: 40 INJECTION, SUSPENSION INTRA-ARTICULAR; INTRAMUSCULAR at 11:11

## 2021-11-17 ENCOUNTER — IMMUNIZATION (OUTPATIENT)
Dept: FAMILY MEDICINE | Facility: CLINIC | Age: 76
End: 2021-11-17
Payer: MEDICARE

## 2021-11-17 ENCOUNTER — LAB VISIT (OUTPATIENT)
Dept: LAB | Facility: HOSPITAL | Age: 76
End: 2021-11-17
Attending: INTERNAL MEDICINE
Payer: MEDICARE

## 2021-11-17 DIAGNOSIS — R80.9 TYPE 2 DIABETES MELLITUS WITH MICROALBUMINURIA, WITHOUT LONG-TERM CURRENT USE OF INSULIN: ICD-10-CM

## 2021-11-17 DIAGNOSIS — E11.29 TYPE 2 DIABETES MELLITUS WITH MICROALBUMINURIA, WITHOUT LONG-TERM CURRENT USE OF INSULIN: ICD-10-CM

## 2021-11-17 DIAGNOSIS — Z23 NEED FOR VACCINATION: Primary | ICD-10-CM

## 2021-11-17 LAB
ESTIMATED AVG GLUCOSE: 217 MG/DL (ref 68–131)
HBA1C MFR BLD: 9.2 % (ref 4–5.6)

## 2021-11-17 PROCEDURE — 83036 HEMOGLOBIN GLYCOSYLATED A1C: CPT | Performed by: INTERNAL MEDICINE

## 2021-11-17 PROCEDURE — 36415 COLL VENOUS BLD VENIPUNCTURE: CPT | Mod: PO | Performed by: INTERNAL MEDICINE

## 2021-11-17 PROCEDURE — 0004A COVID-19, MRNA, LNP-S, PF, 30 MCG/0.3 ML DOSE VACCINE: CPT | Mod: PBBFAC | Performed by: FAMILY MEDICINE

## 2021-12-09 ENCOUNTER — OFFICE VISIT (OUTPATIENT)
Dept: FAMILY MEDICINE | Facility: CLINIC | Age: 76
End: 2021-12-09
Payer: MEDICARE

## 2021-12-09 VITALS
OXYGEN SATURATION: 98 % | HEART RATE: 92 BPM | DIASTOLIC BLOOD PRESSURE: 78 MMHG | SYSTOLIC BLOOD PRESSURE: 138 MMHG | BODY MASS INDEX: 36.37 KG/M2 | WEIGHT: 198.88 LBS

## 2021-12-09 DIAGNOSIS — R80.9 TYPE 2 DIABETES MELLITUS WITH MICROALBUMINURIA, WITHOUT LONG-TERM CURRENT USE OF INSULIN: Primary | ICD-10-CM

## 2021-12-09 DIAGNOSIS — E11.29 TYPE 2 DIABETES MELLITUS WITH MICROALBUMINURIA, WITHOUT LONG-TERM CURRENT USE OF INSULIN: Primary | ICD-10-CM

## 2021-12-09 DIAGNOSIS — I10 ESSENTIAL HYPERTENSION: ICD-10-CM

## 2021-12-09 DIAGNOSIS — H35.033 HYPERTENSIVE RETINOPATHY, BILATERAL: ICD-10-CM

## 2021-12-09 PROCEDURE — 99999 PR PBB SHADOW E&M-EST. PATIENT-LVL III: ICD-10-PCS | Mod: PBBFAC,,, | Performed by: INTERNAL MEDICINE

## 2021-12-09 PROCEDURE — 99999 PR PBB SHADOW E&M-EST. PATIENT-LVL III: CPT | Mod: PBBFAC,,, | Performed by: INTERNAL MEDICINE

## 2021-12-09 PROCEDURE — 99214 OFFICE O/P EST MOD 30 MIN: CPT | Mod: S$GLB,,, | Performed by: INTERNAL MEDICINE

## 2021-12-09 PROCEDURE — 99214 PR OFFICE/OUTPT VISIT, EST, LEVL IV, 30-39 MIN: ICD-10-PCS | Mod: S$GLB,,, | Performed by: INTERNAL MEDICINE

## 2021-12-09 RX ORDER — METFORMIN HYDROCHLORIDE 500 MG/1
500 TABLET, EXTENDED RELEASE ORAL 2 TIMES DAILY WITH MEALS
Qty: 180 TABLET | Refills: 3 | Status: SHIPPED | OUTPATIENT
Start: 2021-12-09 | End: 2022-03-10 | Stop reason: SDUPTHER

## 2021-12-09 RX ORDER — GLIPIZIDE 5 MG/1
5 TABLET, FILM COATED, EXTENDED RELEASE ORAL
Qty: 90 TABLET | Refills: 3 | Status: SHIPPED | OUTPATIENT
Start: 2021-12-09 | End: 2023-01-29

## 2021-12-20 ENCOUNTER — TELEPHONE (OUTPATIENT)
Dept: ORTHOPEDICS | Facility: CLINIC | Age: 76
End: 2021-12-20
Payer: MEDICARE

## 2021-12-22 DIAGNOSIS — E78.49 OTHER HYPERLIPIDEMIA: ICD-10-CM

## 2021-12-24 RX ORDER — GLIMEPIRIDE 2 MG/1
2 TABLET ORAL
Qty: 90 TABLET | Refills: 0 | OUTPATIENT
Start: 2021-12-24 | End: 2022-12-24

## 2021-12-24 RX ORDER — ATORVASTATIN CALCIUM 10 MG/1
TABLET, FILM COATED ORAL
Qty: 90 TABLET | Refills: 3 | Status: SHIPPED | OUTPATIENT
Start: 2021-12-24 | End: 2023-03-16

## 2021-12-24 RX ORDER — OLMESARTAN MEDOXOMIL 40 MG/1
TABLET ORAL
Qty: 90 TABLET | Refills: 3 | Status: SHIPPED | OUTPATIENT
Start: 2021-12-24 | End: 2022-12-22

## 2021-12-26 RX ORDER — ALLOPURINOL 100 MG/1
TABLET ORAL
Qty: 90 TABLET | Refills: 0 | Status: SHIPPED | OUTPATIENT
Start: 2021-12-26 | End: 2022-01-24

## 2022-01-13 NOTE — TELEPHONE ENCOUNTER
No new care gaps identified.  Powered by [employer]. Reference number: [id number].   1/13/2022 2:31:14 PM CST

## 2022-01-21 RX ORDER — AMLODIPINE BESYLATE 10 MG/1
TABLET ORAL
Qty: 90 TABLET | Refills: 3 | Status: SHIPPED | OUTPATIENT
Start: 2022-01-21 | End: 2023-01-27

## 2022-01-21 NOTE — TELEPHONE ENCOUNTER
Refill Routing Note   Medication(s) are not appropriate for processing by [facility] for the following reason(s):      - Required laboratory values are outdated  - Drug-Disease Interaction (allopurinoL and Type 2 diabetes mellitus with microalbuminuria, without long-term current use of insulin)    [facility] action(s):  Defer  Approve Medication-related problems identified: Drug-disease interaction        --->Care Gap information included in message below if applicable.   Medication reconciliation completed: No   Automatic Epic Generated Protocol Data:    Orders Placed This Encounter    amLODIPine (NORVASC) 10 MG tablet      Requested Prescriptions   Pending Prescriptions Disp Refills    allopurinoL (ZYLOPRIM) 100 MG tablet [Pharmacy Med Name: ALLOPURINOL 100 MG TABLET] 90 tablet 0     Sig: TAKE 1 TABLET BY MOUTH EVERY DAY       Endocrinology:  Gout Agents - allopurinol Failed - 1/13/2022  2:30 PM        Failed - Uric Acid within 360 days     Uric Acid   Date Value Ref Range Status   05/15/2019 5.4 2.4 - 5.7 mg/dL Final   02/08/2019 6.5 (H) 2.4 - 5.7 mg/dL Final   11/09/2018 6.4 (H) 2.4 - 5.7 mg/dL Final              Passed - Patient is at least 18 years old        Passed - Valid encounter within last 15 months     Recent Visits  Date Type Provider Dept   12/09/21 Office Visit Vi Dubose MD [facility]   09/07/21 Office Visit Vi Dubose MD [facility]   06/03/21 Office Visit Vi Dubose MD [facility]   03/03/21 Office Visit Vi Dubose MD [facility]   08/25/20 Office Visit Vi Dubose MD [facility]   05/20/20 Office Visit Vi Dubose MD [facility]   Showing recent visits within past 720 days and meeting all other requirements  Future Appointments  No visits were found meeting these conditions.  Showing future appointments within next 150 days and meeting all other requirements      Future Appointments              Today  DIGITAL MEDICINE, TECH SUPPORT [facility], Virtual    In 3 weeks Matthew Patel MD [place] [facility] [place], [place]    In 1 month LAB, [facility] - Lab, [place]    In 1 month Vi Dubose MD [place] - [facility] Medicine, [place]                Passed - Cr is 1.39 or below and within 360 days     Lab Results   Component Value Date    CREATININE 0.8 09/07/2021    CREATININE 0.8 03/03/2021    CREATININE 0.8 08/25/2020              Passed - WBC within 360 days     WBC   Date Value Ref Range Status   09/07/2021 9.64 3.90 - 12.70 K/uL Final   08/25/2020 9.16 3.90 - 12.70 K/uL Final   11/20/2018 11.00 3.90 - 12.70 K/uL Final              Passed - RBC within 360 days     RBC   Date Value Ref Range Status   09/07/2021 4.34 4.00 - 5.40 M/uL Final   08/25/2020 4.69 4.00 - 5.40 M/uL Final   11/20/2018 4.33 4.00 - 5.40 M/uL Final              Passed - HGB within 360 days     Hemoglobin   Date Value Ref Range Status   09/07/2021 13.1 12.0 - 16.0 g/dL Final   08/25/2020 13.9 12.0 - 16.0 g/dL Final   11/20/2018 12.3 12.0 - 16.0 g/dL Final              Passed - HCT within 360 days     Hematocrit   Date Value Ref Range Status   09/07/2021 40.6 37.0 - 48.5 % Final   08/25/2020 45.7 37.0 - 48.5 % Final   11/20/2018 40.0 37.0 - 48.5 % Final              Passed - PLT within 360 days     Platelets   Date Value Ref Range Status   09/07/2021 251 150 - 450 K/uL Final   08/25/2020 259 150 - 350 K/uL Final   11/20/2018 263 150 - 350 K/uL Final              Passed - ALT is 131 or below and within 360 days     ALT   Date Value Ref Range Status   09/07/2021 12 10 - 44 U/L Final   03/03/2021 21 10 - 44 U/L Final   08/25/2020 21 10 - 44 U/L Final              Passed - AST is 119 or below and within 360 days     AST   Date Value Ref Range Status   09/07/2021 15 10 - 40 U/L Final   03/03/2021 17 10 - 40 U/L Final   08/25/2020 18 10 - 40 U/L Final              Passed - eGFR within 360 days     Lab Results   Component  Value Date    EGFRNONAA >60.0 09/07/2021    EGFRNONAA >60.0 03/03/2021    EGFRNONAA >60.0 08/25/2020                 Signed Prescriptions Disp Refills    amLODIPine (NORVASC) 10 MG tablet 90 tablet 3     Sig: TAKE 1 TABLET BY MOUTH EVERY DAY       Cardiovascular:  Calcium Channel Blockers Passed - 1/13/2022  2:30 PM        Passed - Patient is at least 18 years old        Passed - Last BP in normal range within 360 days     BP Readings from Last 1 Encounters:   12/09/21 138/78               Passed - Valid encounter within last 15 months     Recent Visits  Date Type Provider Dept   12/09/21 Office Visit Vi Dubose MD [facility]   09/07/21 Office Visit Vi Dubose MD [facility]   06/03/21 Office Visit Vi Dubose MD [facility]   03/03/21 Office Visit Vi Dubose MD [facility]   08/25/20 Office Visit Vi Dubose MD [facility]   05/20/20 Office Visit Vi Dubose MD [facility]   Showing recent visits within past 720 days and meeting all other requirements  Future Appointments  No visits were found meeting these conditions.  Showing future appointments within next 150 days and meeting all other requirements      Future Appointments              Today DIGITAL MEDICINE, TECH SUPPORT [facility], Virtual    In 3 weeks MD [facility] - Orthopedics, [place]    In 1 month LAB, [facility]    [facility]                      Appointments  past 12m or future 3m with PCP    Date Provider   Last Visit   12/9/2021 Vi Dubose MD   Next Visit   3/10/2022 Vi Dubose MD   ED visits in past 90 days: 0        Note composed:3:21 PM 01/21/2022

## 2022-01-24 RX ORDER — ALLOPURINOL 100 MG/1
TABLET ORAL
Qty: 90 TABLET | Refills: 0 | Status: SHIPPED | OUTPATIENT
Start: 2022-01-24 | End: 2022-06-07

## 2022-03-03 ENCOUNTER — LAB VISIT (OUTPATIENT)
Dept: LAB | Facility: HOSPITAL | Age: 77
End: 2022-03-03
Attending: INTERNAL MEDICINE
Payer: MEDICARE

## 2022-03-03 DIAGNOSIS — E11.29 TYPE 2 DIABETES MELLITUS WITH MICROALBUMINURIA, WITHOUT LONG-TERM CURRENT USE OF INSULIN: ICD-10-CM

## 2022-03-03 DIAGNOSIS — R80.9 TYPE 2 DIABETES MELLITUS WITH MICROALBUMINURIA, WITHOUT LONG-TERM CURRENT USE OF INSULIN: ICD-10-CM

## 2022-03-03 LAB
ESTIMATED AVG GLUCOSE: 269 MG/DL (ref 68–131)
HBA1C MFR BLD: 11 % (ref 4–5.6)

## 2022-03-03 PROCEDURE — 83036 HEMOGLOBIN GLYCOSYLATED A1C: CPT | Performed by: INTERNAL MEDICINE

## 2022-03-03 PROCEDURE — 36415 COLL VENOUS BLD VENIPUNCTURE: CPT | Mod: PO | Performed by: INTERNAL MEDICINE

## 2022-03-10 ENCOUNTER — OFFICE VISIT (OUTPATIENT)
Dept: FAMILY MEDICINE | Facility: CLINIC | Age: 77
End: 2022-03-10
Payer: MEDICARE

## 2022-03-10 VITALS
BODY MASS INDEX: 35.83 KG/M2 | HEIGHT: 62 IN | HEART RATE: 86 BPM | DIASTOLIC BLOOD PRESSURE: 72 MMHG | OXYGEN SATURATION: 98 % | SYSTOLIC BLOOD PRESSURE: 138 MMHG | WEIGHT: 194.69 LBS

## 2022-03-10 DIAGNOSIS — I77.1 STRICTURE OF ARTERY: ICD-10-CM

## 2022-03-10 DIAGNOSIS — I20.89 OTHER FORMS OF ANGINA PECTORIS: ICD-10-CM

## 2022-03-10 DIAGNOSIS — R80.9 TYPE 2 DIABETES MELLITUS WITH MICROALBUMINURIA, WITHOUT LONG-TERM CURRENT USE OF INSULIN: Primary | ICD-10-CM

## 2022-03-10 DIAGNOSIS — I50.30 DIASTOLIC CONGESTIVE HEART FAILURE, UNSPECIFIED HF CHRONICITY: ICD-10-CM

## 2022-03-10 DIAGNOSIS — E11.29 TYPE 2 DIABETES MELLITUS WITH MICROALBUMINURIA, WITHOUT LONG-TERM CURRENT USE OF INSULIN: Primary | ICD-10-CM

## 2022-03-10 DIAGNOSIS — E66.01 MORBID (SEVERE) OBESITY DUE TO EXCESS CALORIES: ICD-10-CM

## 2022-03-10 PROCEDURE — 3078F PR MOST RECENT DIASTOLIC BLOOD PRESSURE < 80 MM HG: ICD-10-PCS | Mod: CPTII,S$GLB,, | Performed by: INTERNAL MEDICINE

## 2022-03-10 PROCEDURE — 99499 UNLISTED E&M SERVICE: CPT | Mod: S$GLB,,, | Performed by: INTERNAL MEDICINE

## 2022-03-10 PROCEDURE — 3075F PR MOST RECENT SYSTOLIC BLOOD PRESS GE 130-139MM HG: ICD-10-PCS | Mod: CPTII,S$GLB,, | Performed by: INTERNAL MEDICINE

## 2022-03-10 PROCEDURE — 3288F PR FALLS RISK ASSESSMENT DOCUMENTED: ICD-10-PCS | Mod: CPTII,S$GLB,, | Performed by: INTERNAL MEDICINE

## 2022-03-10 PROCEDURE — 1101F PR PT FALLS ASSESS DOC 0-1 FALLS W/OUT INJ PAST YR: ICD-10-PCS | Mod: CPTII,S$GLB,, | Performed by: INTERNAL MEDICINE

## 2022-03-10 PROCEDURE — 99214 OFFICE O/P EST MOD 30 MIN: CPT | Mod: S$GLB,,, | Performed by: INTERNAL MEDICINE

## 2022-03-10 PROCEDURE — 3288F FALL RISK ASSESSMENT DOCD: CPT | Mod: CPTII,S$GLB,, | Performed by: INTERNAL MEDICINE

## 2022-03-10 PROCEDURE — 3075F SYST BP GE 130 - 139MM HG: CPT | Mod: CPTII,S$GLB,, | Performed by: INTERNAL MEDICINE

## 2022-03-10 PROCEDURE — 99214 PR OFFICE/OUTPT VISIT, EST, LEVL IV, 30-39 MIN: ICD-10-PCS | Mod: S$GLB,,, | Performed by: INTERNAL MEDICINE

## 2022-03-10 PROCEDURE — 99999 PR PBB SHADOW E&M-EST. PATIENT-LVL III: ICD-10-PCS | Mod: PBBFAC,,, | Performed by: INTERNAL MEDICINE

## 2022-03-10 PROCEDURE — 99999 PR PBB SHADOW E&M-EST. PATIENT-LVL III: CPT | Mod: PBBFAC,,, | Performed by: INTERNAL MEDICINE

## 2022-03-10 PROCEDURE — 1125F PR PAIN SEVERITY QUANTIFIED, PAIN PRESENT: ICD-10-PCS | Mod: CPTII,S$GLB,, | Performed by: INTERNAL MEDICINE

## 2022-03-10 PROCEDURE — 99499 RISK ADDL DX/OHS AUDIT: ICD-10-PCS | Mod: S$GLB,,, | Performed by: INTERNAL MEDICINE

## 2022-03-10 PROCEDURE — 1101F PT FALLS ASSESS-DOCD LE1/YR: CPT | Mod: CPTII,S$GLB,, | Performed by: INTERNAL MEDICINE

## 2022-03-10 PROCEDURE — 1125F AMNT PAIN NOTED PAIN PRSNT: CPT | Mod: CPTII,S$GLB,, | Performed by: INTERNAL MEDICINE

## 2022-03-10 PROCEDURE — 3078F DIAST BP <80 MM HG: CPT | Mod: CPTII,S$GLB,, | Performed by: INTERNAL MEDICINE

## 2022-03-10 RX ORDER — TRIAMCINOLONE ACETONIDE 1 MG/ML
LOTION TOPICAL 2 TIMES DAILY
Qty: 60 ML | Refills: 1 | Status: SHIPPED | OUTPATIENT
Start: 2022-03-10 | End: 2022-06-15 | Stop reason: SDUPTHER

## 2022-03-10 RX ORDER — METFORMIN HYDROCHLORIDE 500 MG/1
500 TABLET, EXTENDED RELEASE ORAL 2 TIMES DAILY WITH MEALS
Qty: 180 TABLET | Refills: 3 | Status: SHIPPED | OUTPATIENT
Start: 2022-03-10 | End: 2023-08-10

## 2022-03-10 NOTE — PROGRESS NOTES
Subjective:       Patient ID: Shawanda Desir is a 76 y.o. female.    Chief Complaint: Medication Problem (Skin discolored/Since jan. /Arms tingle on and off )    htn- st able on medication  Diabetes- on metformin xr 500 daily (supposed to be on bid) and glipizide. Admits to not taking her medication for afew weeks. a1c up to 11!       Review of Systems   Constitutional: Negative for fever.   Respiratory: Negative for shortness of breath.    Cardiovascular: Negative for chest pain.   Neurological: Negative for headaches.         Objective:      Physical Exam  Constitutional:       Appearance: Normal appearance.   HENT:      Head: Normocephalic.   Musculoskeletal:         General: Normal range of motion.      Cervical back: Normal range of motion.   Neurological:      General: No focal deficit present.      Mental Status: She is alert.   Psychiatric:         Mood and Affect: Mood normal.         Behavior: Behavior normal.         Assessment:       Problem List Items Addressed This Visit        Cardiac/Vascular    Diastolic congestive heart failure    Other forms of angina pectoris    Stricture of artery       Endocrine    Type 2 diabetes mellitus with microalbuminuria, without long-term current use of insulin - Primary    Relevant Medications    metFORMIN (GLUCOPHAGE-XR) 500 MG ER 24hr tablet    Other Relevant Orders    Hemoglobin A1C    Comprehensive Metabolic Panel    Morbid (severe) obesity due to excess calories          Plan:       htn- stable continue meds  Dm- uncontrolled. increaese metfomin to bid. Be consistent with meds. Pt to decrease carbs. Follow up 3 mo with a1c

## 2022-03-16 ENCOUNTER — TELEPHONE (OUTPATIENT)
Dept: FAMILY MEDICINE | Facility: CLINIC | Age: 77
End: 2022-03-16
Payer: MEDICARE

## 2022-03-16 NOTE — TELEPHONE ENCOUNTER
Spoke with pt. She states Dr. Dubose discontinued some of her medications and she just wanted to make sure her pharmacy knew so they would not refill it. Advised once the medication was discontinued the pharmacy would no longer be allowed to fill it.

## 2022-03-16 NOTE — TELEPHONE ENCOUNTER
----- Message from Joanie Fletcher sent at 3/16/2022 12:29 PM CDT -----  Contact: Pt  Type: Needs Medical Advice    Who Called:Pt  Best Call Back Number:[phone]  Additional Information Requesting a call back regarding Pt was calling in regards to medication wanted Dr over to pharmacy to discontinue medications for oxybutynin (DITROPAN-XL) 5 MG TR24 (), allopurinoL (ZYLOPRIM) 100 MG tablet and the meloxicam (MOBIC) 15 MG tablet so pharmacy does not request any more refills if any questions please call Thank you  Please Advise-Thank you

## 2022-03-28 ENCOUNTER — PES CALL (OUTPATIENT)
Dept: ADMINISTRATIVE | Facility: CLINIC | Age: 77
End: 2022-03-28
Payer: MEDICARE

## 2022-04-21 ENCOUNTER — OFFICE VISIT (OUTPATIENT)
Dept: ORTHOPEDICS | Facility: CLINIC | Age: 77
End: 2022-04-21
Payer: MEDICARE

## 2022-04-21 VITALS — HEIGHT: 62 IN | BODY MASS INDEX: 35.83 KG/M2 | WEIGHT: 194.69 LBS

## 2022-04-21 DIAGNOSIS — M17.0 BILATERAL PRIMARY OSTEOARTHRITIS OF KNEE: Primary | ICD-10-CM

## 2022-04-21 DIAGNOSIS — Z01.818 PRE-OP TESTING: ICD-10-CM

## 2022-04-21 DIAGNOSIS — M17.11 PRIMARY OSTEOARTHRITIS OF RIGHT KNEE: ICD-10-CM

## 2022-04-21 PROCEDURE — 1160F RVW MEDS BY RX/DR IN RCRD: CPT | Mod: CPTII,S$GLB,, | Performed by: ORTHOPAEDIC SURGERY

## 2022-04-21 PROCEDURE — 1101F PT FALLS ASSESS-DOCD LE1/YR: CPT | Mod: CPTII,S$GLB,, | Performed by: ORTHOPAEDIC SURGERY

## 2022-04-21 PROCEDURE — 99214 PR OFFICE/OUTPT VISIT, EST, LEVL IV, 30-39 MIN: ICD-10-PCS | Mod: S$GLB,,, | Performed by: ORTHOPAEDIC SURGERY

## 2022-04-21 PROCEDURE — 99999 PR PBB SHADOW E&M-EST. PATIENT-LVL III: ICD-10-PCS | Mod: PBBFAC,,, | Performed by: ORTHOPAEDIC SURGERY

## 2022-04-21 PROCEDURE — 3288F FALL RISK ASSESSMENT DOCD: CPT | Mod: CPTII,S$GLB,, | Performed by: ORTHOPAEDIC SURGERY

## 2022-04-21 PROCEDURE — 99214 OFFICE O/P EST MOD 30 MIN: CPT | Mod: S$GLB,,, | Performed by: ORTHOPAEDIC SURGERY

## 2022-04-21 PROCEDURE — 1101F PR PT FALLS ASSESS DOC 0-1 FALLS W/OUT INJ PAST YR: ICD-10-PCS | Mod: CPTII,S$GLB,, | Performed by: ORTHOPAEDIC SURGERY

## 2022-04-21 PROCEDURE — 1159F PR MEDICATION LIST DOCUMENTED IN MEDICAL RECORD: ICD-10-PCS | Mod: CPTII,S$GLB,, | Performed by: ORTHOPAEDIC SURGERY

## 2022-04-21 PROCEDURE — 1125F PR PAIN SEVERITY QUANTIFIED, PAIN PRESENT: ICD-10-PCS | Mod: CPTII,S$GLB,, | Performed by: ORTHOPAEDIC SURGERY

## 2022-04-21 PROCEDURE — 1159F MED LIST DOCD IN RCRD: CPT | Mod: CPTII,S$GLB,, | Performed by: ORTHOPAEDIC SURGERY

## 2022-04-21 PROCEDURE — 99999 PR PBB SHADOW E&M-EST. PATIENT-LVL III: CPT | Mod: PBBFAC,,, | Performed by: ORTHOPAEDIC SURGERY

## 2022-04-21 PROCEDURE — 3288F PR FALLS RISK ASSESSMENT DOCUMENTED: ICD-10-PCS | Mod: CPTII,S$GLB,, | Performed by: ORTHOPAEDIC SURGERY

## 2022-04-21 PROCEDURE — 1125F AMNT PAIN NOTED PAIN PRSNT: CPT | Mod: CPTII,S$GLB,, | Performed by: ORTHOPAEDIC SURGERY

## 2022-04-21 PROCEDURE — 1160F PR REVIEW ALL MEDS BY PRESCRIBER/CLIN PHARMACIST DOCUMENTED: ICD-10-PCS | Mod: CPTII,S$GLB,, | Performed by: ORTHOPAEDIC SURGERY

## 2022-04-21 RX ORDER — SODIUM CHLORIDE 0.9 % (FLUSH) 0.9 %
10 SYRINGE (ML) INJECTION EVERY 6 HOURS PRN
Status: DISCONTINUED | OUTPATIENT
Start: 2022-04-21 | End: 2022-08-25 | Stop reason: CLARIF

## 2022-04-21 NOTE — H&P
Chief Complaint   Patient presents with    Left Knee - Pain, Swelling    Right Knee - Pain, Swelling         HPI:   This is a 76 y.o. who presents to clinic today complaining of right knee pain for 5 years after no known trauma. Pain is progressively worsening. No numbness or tingling. No associated signs or symptoms. She has failed injections, PT, and NSAIDs.     Past Medical History:   Diagnosis Date    Diabetes mellitus     Hypertension      Past Surgical History:   Procedure Laterality Date    BACK SURGERY      BREAST BIOPSY Right     neg--core    CERVICAL SPINE SURGERY       Current Outpatient Medications on File Prior to Visit   Medication Sig Dispense Refill    allopurinoL (ZYLOPRIM) 100 MG tablet TAKE 1 TABLET BY MOUTH EVERY DAY 90 tablet 0    amLODIPine (NORVASC) 10 MG tablet TAKE 1 TABLET BY MOUTH EVERY DAY 90 tablet 3    aspirin (ECOTRIN) 81 MG EC tablet Take 81 mg by mouth once daily.      atorvastatin (LIPITOR) 10 MG tablet TAKE 1 TABLET BY MOUTH EVERY DAY IN THE EVENING 90 tablet 3    glipiZIDE (GLUCOTROL) 5 MG TR24 Take 1 tablet (5 mg total) by mouth daily with breakfast. 90 tablet 3    meloxicam (MOBIC) 15 MG tablet Take 1 tablet (15 mg total) by mouth once daily. 30 tablet 11    metFORMIN (GLUCOPHAGE-XR) 500 MG ER 24hr tablet Take 1 tablet (500 mg total) by mouth 2 (two) times daily with meals. 180 tablet 3    olmesartan (BENICAR) 40 MG tablet TAKE 1 TABLET BY MOUTH EVERY DAY 90 tablet 3    triamcinolone acetonide 0.1% (KENALOG) 0.1 % Lotn Apply topically 2 (two) times daily. 60 mL 1    oxybutynin (DITROPAN-XL) 5 MG TR24 Take 1 tablet (5 mg total) by mouth once daily. 30 tablet 11     No current facility-administered medications on file prior to visit.     Review of patient's allergies indicates:   Allergen Reactions    Clonidine      Causes chest tightness and leg swelling    Tradjenta [linagliptin]      Chest tightness and leg swelling     Family History   Problem Relation Age  of Onset    Cancer Father      Social History     Socioeconomic History    Marital status:    Tobacco Use    Smoking status: Never Smoker    Smokeless tobacco: Never Used   Substance and Sexual Activity    Alcohol use: No    Drug use: No       Review of Systems:  Constitutional:  Denies fever or chills   Eyes:  Denies change in visual acuity   HENT:  Denies nasal congestion or sore throat   Respiratory:  Denies cough or shortness of breath   Cardiovascular:  Denies chest pain or edema   GI:  Denies abdominal pain, nausea, vomiting, bloody stools or diarrhea   :  Denies dysuria   Integument:  Denies rash   Neurologic:  Denies headache, focal weakness or sensory changes   Endocrine:  Denies polyuria or polydipsia   Lymphatic:  Denies swollen glands   Psychiatric:  Denies depression or anxiety     Physical Exam:   Constitutional:  Well developed, well nourished, no acute distress, non-toxic appearance   Integument:  Well hydrated, no rash   Lymphatic:  No lymphadenopathy noted   Neurologic:  Alert & oriented x 3, CN 2-12 normal, normal motor function, normal sensory function, no focal deficits noted   Psychiatric:  Speech and behavior appropriate   Eyes: EOMI  Gi: abdomen soft    Bilateral Knee Exam    right Knee Exam     Tenderness   The patient is experiencing tenderness in the medial joint line.    Range of Motion   Extension: abnormal   Flexion: abnormal     Muscle Strength     The patient has normal knee strength.    Tests   Aide:  Medial - positive   Lachman:  Anterior - negative      Varus: negative  Valgus: negative  Patellar Apprehension: negative    Other   Erythema: absent  Sensation: normal  Pulse: present  Swelling: mild      left Knee Exam   left knee exam performed same as contralateral side and is normal.            X-rays were performed, personally reviewed by me and findings discussed with the patient.  3 views of the right knee show tricompartmental degenerative change most  pronounced in the medial compartment with Kellgren 3 changes    Bilateral primary osteoarthritis of knee            I had a long discussion with the patient regarding the benefits and risks of right TKA. They voiced understanding and wish to proceed with surgery. Consents signed in clinic.

## 2022-04-25 ENCOUNTER — OFFICE VISIT (OUTPATIENT)
Dept: FAMILY MEDICINE | Facility: CLINIC | Age: 77
End: 2022-04-25
Payer: MEDICARE

## 2022-04-25 VITALS
OXYGEN SATURATION: 97 % | BODY MASS INDEX: 36.1 KG/M2 | HEIGHT: 62 IN | WEIGHT: 196.19 LBS | HEART RATE: 94 BPM | SYSTOLIC BLOOD PRESSURE: 138 MMHG | TEMPERATURE: 98 F | DIASTOLIC BLOOD PRESSURE: 84 MMHG | RESPIRATION RATE: 18 BRPM

## 2022-04-25 DIAGNOSIS — E11.29 TYPE 2 DIABETES MELLITUS WITH MICROALBUMINURIA, WITHOUT LONG-TERM CURRENT USE OF INSULIN: ICD-10-CM

## 2022-04-25 DIAGNOSIS — R80.9 TYPE 2 DIABETES MELLITUS WITH MICROALBUMINURIA, WITHOUT LONG-TERM CURRENT USE OF INSULIN: ICD-10-CM

## 2022-04-25 DIAGNOSIS — I10 ESSENTIAL HYPERTENSION: Primary | ICD-10-CM

## 2022-04-25 PROCEDURE — 3079F DIAST BP 80-89 MM HG: CPT | Mod: CPTII,S$GLB,, | Performed by: NURSE PRACTITIONER

## 2022-04-25 PROCEDURE — 1160F PR REVIEW ALL MEDS BY PRESCRIBER/CLIN PHARMACIST DOCUMENTED: ICD-10-PCS | Mod: CPTII,S$GLB,, | Performed by: NURSE PRACTITIONER

## 2022-04-25 PROCEDURE — 3288F PR FALLS RISK ASSESSMENT DOCUMENTED: ICD-10-PCS | Mod: CPTII,S$GLB,, | Performed by: NURSE PRACTITIONER

## 2022-04-25 PROCEDURE — 1101F PT FALLS ASSESS-DOCD LE1/YR: CPT | Mod: CPTII,S$GLB,, | Performed by: NURSE PRACTITIONER

## 2022-04-25 PROCEDURE — 3079F PR MOST RECENT DIASTOLIC BLOOD PRESSURE 80-89 MM HG: ICD-10-PCS | Mod: CPTII,S$GLB,, | Performed by: NURSE PRACTITIONER

## 2022-04-25 PROCEDURE — 1160F RVW MEDS BY RX/DR IN RCRD: CPT | Mod: CPTII,S$GLB,, | Performed by: NURSE PRACTITIONER

## 2022-04-25 PROCEDURE — 1159F MED LIST DOCD IN RCRD: CPT | Mod: CPTII,S$GLB,, | Performed by: NURSE PRACTITIONER

## 2022-04-25 PROCEDURE — 99214 OFFICE O/P EST MOD 30 MIN: CPT | Mod: S$GLB,,, | Performed by: NURSE PRACTITIONER

## 2022-04-25 PROCEDURE — 99214 PR OFFICE/OUTPT VISIT, EST, LEVL IV, 30-39 MIN: ICD-10-PCS | Mod: S$GLB,,, | Performed by: NURSE PRACTITIONER

## 2022-04-25 PROCEDURE — 99999 PR PBB SHADOW E&M-EST. PATIENT-LVL IV: ICD-10-PCS | Mod: PBBFAC,,, | Performed by: NURSE PRACTITIONER

## 2022-04-25 PROCEDURE — 3288F FALL RISK ASSESSMENT DOCD: CPT | Mod: CPTII,S$GLB,, | Performed by: NURSE PRACTITIONER

## 2022-04-25 PROCEDURE — 1125F AMNT PAIN NOTED PAIN PRSNT: CPT | Mod: CPTII,S$GLB,, | Performed by: NURSE PRACTITIONER

## 2022-04-25 PROCEDURE — 1159F PR MEDICATION LIST DOCUMENTED IN MEDICAL RECORD: ICD-10-PCS | Mod: CPTII,S$GLB,, | Performed by: NURSE PRACTITIONER

## 2022-04-25 PROCEDURE — 3075F SYST BP GE 130 - 139MM HG: CPT | Mod: CPTII,S$GLB,, | Performed by: NURSE PRACTITIONER

## 2022-04-25 PROCEDURE — 1125F PR PAIN SEVERITY QUANTIFIED, PAIN PRESENT: ICD-10-PCS | Mod: CPTII,S$GLB,, | Performed by: NURSE PRACTITIONER

## 2022-04-25 PROCEDURE — 3075F PR MOST RECENT SYSTOLIC BLOOD PRESS GE 130-139MM HG: ICD-10-PCS | Mod: CPTII,S$GLB,, | Performed by: NURSE PRACTITIONER

## 2022-04-25 PROCEDURE — 1101F PR PT FALLS ASSESS DOC 0-1 FALLS W/OUT INJ PAST YR: ICD-10-PCS | Mod: CPTII,S$GLB,, | Performed by: NURSE PRACTITIONER

## 2022-04-25 PROCEDURE — 99999 PR PBB SHADOW E&M-EST. PATIENT-LVL IV: CPT | Mod: PBBFAC,,, | Performed by: NURSE PRACTITIONER

## 2022-04-25 NOTE — PROGRESS NOTES
Subjective:       Patient ID: Shawanda Desir is a 76 y.o. female.    Chief Complaint: Pre-op Exam (Knee surgery)    Patient is here for pre operative clearance for planned knee surgery on 5/9/22. Her HTn is stable, history of one episode of CHF in past, no current symptoms. Her DM is uncontrolled, Lab Results       Component                Value               Date                       HGBA1C                   11.0 (H)            03/03/2022            Says she was advised to increase metformin to twice daily after that lab result, has not been monitoring her glucoses at home.    Past Medical History:  No date: Diabetes mellitus  No date: Hypertension    Past Surgical History:  No date: BACK SURGERY  No date: BREAST BIOPSY; Right      Comment:  neg--core  No date: CERVICAL SPINE SURGERY    Review of patient's family history indicates:  Problem: Cancer      Relation: Father          Age of Onset: (Not Specified)      Social History    Socioeconomic History      Marital status:     Tobacco Use      Smoking status: Never Smoker      Smokeless tobacco: Never Used    Substance and Sexual Activity      Alcohol use: No      Drug use: No      Current Outpatient Medications:  allopurinoL (ZYLOPRIM) 100 MG tablet, TAKE 1 TABLET BY MOUTH EVERY DAY, Disp: 90 tablet, Rfl: 0  amLODIPine (NORVASC) 10 MG tablet, TAKE 1 TABLET BY MOUTH EVERY DAY, Disp: 90 tablet, Rfl: 3  aspirin (ECOTRIN) 81 MG EC tablet, Take 81 mg by mouth once daily., Disp: , Rfl:   atorvastatin (LIPITOR) 10 MG tablet, TAKE 1 TABLET BY MOUTH EVERY DAY IN THE EVENING, Disp: 90 tablet, Rfl: 3  glipiZIDE (GLUCOTROL) 5 MG TR24, Take 1 tablet (5 mg total) by mouth daily with breakfast., Disp: 90 tablet, Rfl: 3  meloxicam (MOBIC) 15 MG tablet, Take 1 tablet (15 mg total) by mouth once daily., Disp: 30 tablet, Rfl: 11  metFORMIN (GLUCOPHAGE-XR) 500 MG ER 24hr tablet, Take 1 tablet (500 mg total) by mouth 2 (two) times daily with meals., Disp: 180 tablet, Rfl:  3  olmesartan (BENICAR) 40 MG tablet, TAKE 1 TABLET BY MOUTH EVERY DAY, Disp: 90 tablet, Rfl: 3  oxybutynin (DITROPAN-XL) 5 MG TR24, Take 1 tablet (5 mg total) by mouth once daily., Disp: 30 tablet, Rfl: 11  triamcinolone acetonide 0.1% (KENALOG) 0.1 % Lotn, Apply topically 2 (two) times daily., Disp: 60 mL, Rfl: 1    Current Facility-Administered Medications:  sodium chloride 0.9% flush 10 mL, 10 mL, Intravenous, Q6H PRN, Matthew Patel MD        Review of patient's allergies indicates:   -- Clonidine     --  Causes chest tightness and leg swelling   -- Tradjenta (linagliptin)     --  Chest tightness and leg swelling    Review of Systems   Constitutional: Negative.    HENT: Negative.    Respiratory: Negative.    Cardiovascular: Negative.    Genitourinary: Negative.    Musculoskeletal: Positive for arthralgias and joint swelling.   Neurological: Negative.          Objective:      Physical Exam  Constitutional:       Appearance: Normal appearance.   HENT:      Head: Normocephalic and atraumatic.   Cardiovascular:      Rate and Rhythm: Normal rate.   Pulmonary:      Effort: Pulmonary effort is normal. No respiratory distress.   Neurological:      General: No focal deficit present.      Mental Status: She is alert and oriented to person, place, and time.   Psychiatric:         Mood and Affect: Mood normal.         Behavior: Behavior normal.         Assessment:       Problem List Items Addressed This Visit        Unprioritized    Essential hypertension - Primary    Relevant Orders    Hemoglobin A1C    Comprehensive Metabolic Panel    BNP    Type 2 diabetes mellitus with microalbuminuria, without long-term current use of insulin    Relevant Orders    Hemoglobin A1C    Comprehensive Metabolic Panel    BNP          Plan:       1. Essential hypertension  Htn stable.  - Hemoglobin A1C; Future  - Comprehensive Metabolic Panel; Future  - BNP; Future    2. Type 2 diabetes mellitus with microalbuminuria, without long-term  current use of insulin  Uncontrolled, medication compliance discussed, patient would like to recheck Hga1c to see if she can be cleared for surgery, aware this is a 3 month average,  pre op checklist indicates Hga1c must be less than 8 to proceed. Labs scheduled for Tuesday, Appointment with PCP on Wednesday to review and discuss.   - Hemoglobin A1C; Future  - Comprehensive Metabolic Panel; Future  - BNP; Future

## 2022-05-03 ENCOUNTER — LAB VISIT (OUTPATIENT)
Dept: LAB | Facility: HOSPITAL | Age: 77
End: 2022-05-03
Attending: NURSE PRACTITIONER
Payer: MEDICARE

## 2022-05-03 DIAGNOSIS — R80.9 TYPE 2 DIABETES MELLITUS WITH MICROALBUMINURIA, WITHOUT LONG-TERM CURRENT USE OF INSULIN: ICD-10-CM

## 2022-05-03 DIAGNOSIS — I10 ESSENTIAL HYPERTENSION: ICD-10-CM

## 2022-05-03 DIAGNOSIS — E11.29 TYPE 2 DIABETES MELLITUS WITH MICROALBUMINURIA, WITHOUT LONG-TERM CURRENT USE OF INSULIN: ICD-10-CM

## 2022-05-03 LAB
ALBUMIN SERPL BCP-MCNC: 3.7 G/DL (ref 3.5–5.2)
ALP SERPL-CCNC: 164 U/L (ref 55–135)
ALT SERPL W/O P-5'-P-CCNC: 13 U/L (ref 10–44)
ANION GAP SERPL CALC-SCNC: 12 MMOL/L (ref 8–16)
AST SERPL-CCNC: 13 U/L (ref 10–40)
BILIRUB SERPL-MCNC: 0.5 MG/DL (ref 0.1–1)
BNP SERPL-MCNC: 15 PG/ML (ref 0–99)
BUN SERPL-MCNC: 13 MG/DL (ref 8–23)
CALCIUM SERPL-MCNC: 9.6 MG/DL (ref 8.7–10.5)
CHLORIDE SERPL-SCNC: 103 MMOL/L (ref 95–110)
CO2 SERPL-SCNC: 23 MMOL/L (ref 23–29)
CREAT SERPL-MCNC: 0.7 MG/DL (ref 0.5–1.4)
EST. GFR  (AFRICAN AMERICAN): >60 ML/MIN/1.73 M^2
EST. GFR  (NON AFRICAN AMERICAN): >60 ML/MIN/1.73 M^2
ESTIMATED AVG GLUCOSE: 232 MG/DL (ref 68–131)
GLUCOSE SERPL-MCNC: 107 MG/DL (ref 70–110)
HBA1C MFR BLD: 9.7 % (ref 4–5.6)
POTASSIUM SERPL-SCNC: 4.2 MMOL/L (ref 3.5–5.1)
PROT SERPL-MCNC: 7.4 G/DL (ref 6–8.4)
SODIUM SERPL-SCNC: 138 MMOL/L (ref 136–145)

## 2022-05-03 PROCEDURE — 83036 HEMOGLOBIN GLYCOSYLATED A1C: CPT | Performed by: NURSE PRACTITIONER

## 2022-05-03 PROCEDURE — 83880 ASSAY OF NATRIURETIC PEPTIDE: CPT | Performed by: NURSE PRACTITIONER

## 2022-05-03 PROCEDURE — 80053 COMPREHEN METABOLIC PANEL: CPT | Performed by: NURSE PRACTITIONER

## 2022-05-03 PROCEDURE — 36415 COLL VENOUS BLD VENIPUNCTURE: CPT | Mod: PO | Performed by: NURSE PRACTITIONER

## 2022-05-04 ENCOUNTER — TELEPHONE (OUTPATIENT)
Dept: ORTHOPEDICS | Facility: CLINIC | Age: 77
End: 2022-05-04
Payer: MEDICARE

## 2022-05-04 ENCOUNTER — OFFICE VISIT (OUTPATIENT)
Dept: FAMILY MEDICINE | Facility: CLINIC | Age: 77
End: 2022-05-04
Payer: MEDICARE

## 2022-05-04 VITALS
HEART RATE: 65 BPM | OXYGEN SATURATION: 98 % | WEIGHT: 194 LBS | SYSTOLIC BLOOD PRESSURE: 138 MMHG | DIASTOLIC BLOOD PRESSURE: 74 MMHG | BODY MASS INDEX: 35.48 KG/M2

## 2022-05-04 DIAGNOSIS — E11.29 TYPE 2 DIABETES MELLITUS WITH MICROALBUMINURIA, WITHOUT LONG-TERM CURRENT USE OF INSULIN: ICD-10-CM

## 2022-05-04 DIAGNOSIS — R80.9 TYPE 2 DIABETES MELLITUS WITH MICROALBUMINURIA, WITHOUT LONG-TERM CURRENT USE OF INSULIN: ICD-10-CM

## 2022-05-04 DIAGNOSIS — I10 ESSENTIAL HYPERTENSION: Primary | ICD-10-CM

## 2022-05-04 PROCEDURE — 99999 PR PBB SHADOW E&M-EST. PATIENT-LVL III: ICD-10-PCS | Mod: PBBFAC,,, | Performed by: INTERNAL MEDICINE

## 2022-05-04 PROCEDURE — 3288F FALL RISK ASSESSMENT DOCD: CPT | Mod: CPTII,S$GLB,, | Performed by: INTERNAL MEDICINE

## 2022-05-04 PROCEDURE — 3288F PR FALLS RISK ASSESSMENT DOCUMENTED: ICD-10-PCS | Mod: CPTII,S$GLB,, | Performed by: INTERNAL MEDICINE

## 2022-05-04 PROCEDURE — 1101F PT FALLS ASSESS-DOCD LE1/YR: CPT | Mod: CPTII,S$GLB,, | Performed by: INTERNAL MEDICINE

## 2022-05-04 PROCEDURE — 1159F PR MEDICATION LIST DOCUMENTED IN MEDICAL RECORD: ICD-10-PCS | Mod: CPTII,S$GLB,, | Performed by: INTERNAL MEDICINE

## 2022-05-04 PROCEDURE — 99214 OFFICE O/P EST MOD 30 MIN: CPT | Mod: S$GLB,,, | Performed by: INTERNAL MEDICINE

## 2022-05-04 PROCEDURE — 3075F PR MOST RECENT SYSTOLIC BLOOD PRESS GE 130-139MM HG: ICD-10-PCS | Mod: CPTII,S$GLB,, | Performed by: INTERNAL MEDICINE

## 2022-05-04 PROCEDURE — 3075F SYST BP GE 130 - 139MM HG: CPT | Mod: CPTII,S$GLB,, | Performed by: INTERNAL MEDICINE

## 2022-05-04 PROCEDURE — 1101F PR PT FALLS ASSESS DOC 0-1 FALLS W/OUT INJ PAST YR: ICD-10-PCS | Mod: CPTII,S$GLB,, | Performed by: INTERNAL MEDICINE

## 2022-05-04 PROCEDURE — 99499 UNLISTED E&M SERVICE: CPT | Mod: S$GLB,,, | Performed by: INTERNAL MEDICINE

## 2022-05-04 PROCEDURE — 1125F PR PAIN SEVERITY QUANTIFIED, PAIN PRESENT: ICD-10-PCS | Mod: CPTII,S$GLB,, | Performed by: INTERNAL MEDICINE

## 2022-05-04 PROCEDURE — 99214 PR OFFICE/OUTPT VISIT, EST, LEVL IV, 30-39 MIN: ICD-10-PCS | Mod: S$GLB,,, | Performed by: INTERNAL MEDICINE

## 2022-05-04 PROCEDURE — 99499 RISK ADDL DX/OHS AUDIT: ICD-10-PCS | Mod: S$GLB,,, | Performed by: INTERNAL MEDICINE

## 2022-05-04 PROCEDURE — 1159F MED LIST DOCD IN RCRD: CPT | Mod: CPTII,S$GLB,, | Performed by: INTERNAL MEDICINE

## 2022-05-04 PROCEDURE — 3078F PR MOST RECENT DIASTOLIC BLOOD PRESSURE < 80 MM HG: ICD-10-PCS | Mod: CPTII,S$GLB,, | Performed by: INTERNAL MEDICINE

## 2022-05-04 PROCEDURE — 3078F DIAST BP <80 MM HG: CPT | Mod: CPTII,S$GLB,, | Performed by: INTERNAL MEDICINE

## 2022-05-04 PROCEDURE — 99999 PR PBB SHADOW E&M-EST. PATIENT-LVL III: CPT | Mod: PBBFAC,,, | Performed by: INTERNAL MEDICINE

## 2022-05-04 PROCEDURE — 1125F AMNT PAIN NOTED PAIN PRSNT: CPT | Mod: CPTII,S$GLB,, | Performed by: INTERNAL MEDICINE

## 2022-05-04 NOTE — PROGRESS NOTES
Subjective:       Patient ID: Shawanda Desir is a 76 y.o. female.    Chief Complaint: Pre-op Exam    Pt here for preop left knee replacement.  She recnetly had blood work and ecg. She had ct knee as well in p reparation.  No cp or sob. bp stable    Dm- a1c was 9.7 yesterday however this is down from 11. She had stopped her medication (metformin and glipizde) d/t concern they were causing a rash. We resumed these meds about 3 weeks ago. Her sugars are now good (fasting recetnly 107) but will take a while for a1c to catch up    Review of Systems   Constitutional: Negative for fever.   Respiratory: Negative for shortness of breath.    Cardiovascular: Negative for chest pain.   Neurological: Negative for headaches.         Objective:      Physical Exam  Constitutional:       Appearance: Normal appearance.   HENT:      Head: Normocephalic.   Cardiovascular:      Rate and Rhythm: Normal rate and regular rhythm.   Pulmonary:      Effort: Pulmonary effort is normal.      Breath sounds: Normal breath sounds.   Musculoskeletal:         General: Normal range of motion.      Cervical back: Normal range of motion.   Neurological:      General: No focal deficit present.      Mental Status: She is alert.   Psychiatric:         Mood and Affect: Mood normal.         Behavior: Behavior normal.         Assessment:       Problem List Items Addressed This Visit        Cardiac/Vascular    Essential hypertension - Primary       Endocrine    Type 2 diabetes mellitus with microalbuminuria, without long-term current use of insulin          Plan:       preop- > 4 mets, proceed with surgery.  Pt a1c is up but that is because she just resumed medication 3 weeks ago. Her fasting sugars hav ebeen good (Recently 107) and checking daily  htn- stable

## 2022-05-04 NOTE — TELEPHONE ENCOUNTER
Spoke with patient. Explained a1c is to high. Must be 8 or below. Will await new labs in 1 month per patients request.

## 2022-05-09 ENCOUNTER — PATIENT MESSAGE (OUTPATIENT)
Dept: SMOKING CESSATION | Facility: CLINIC | Age: 77
End: 2022-05-09
Payer: MEDICARE

## 2022-05-20 ENCOUNTER — TELEPHONE (OUTPATIENT)
Dept: ORTHOPEDICS | Facility: CLINIC | Age: 77
End: 2022-05-20
Payer: MEDICARE

## 2022-05-20 NOTE — TELEPHONE ENCOUNTER
----- Message from Ann Lemons sent at 5/20/2022  2:09 PM CDT -----  Contact: [phone]  Type: Needs Medical Advice  Who Called:  Pt    Best Call Back Number: [phone]    Additional Information: Pt is calling about her appt On may 24th for post op. Pt did not have surgery and wants to know if still needs to come in .

## 2022-05-20 NOTE — TELEPHONE ENCOUNTER
Spoke with pt and advise that she doesn't have to keep her post-op appointment since her sx was cancelled.

## 2022-06-08 ENCOUNTER — LAB VISIT (OUTPATIENT)
Dept: LAB | Facility: HOSPITAL | Age: 77
End: 2022-06-08
Attending: INTERNAL MEDICINE
Payer: MEDICARE

## 2022-06-08 DIAGNOSIS — E11.29 TYPE 2 DIABETES MELLITUS WITH MICROALBUMINURIA, WITHOUT LONG-TERM CURRENT USE OF INSULIN: ICD-10-CM

## 2022-06-08 DIAGNOSIS — R80.9 TYPE 2 DIABETES MELLITUS WITH MICROALBUMINURIA, WITHOUT LONG-TERM CURRENT USE OF INSULIN: ICD-10-CM

## 2022-06-08 LAB
ALBUMIN SERPL BCP-MCNC: 3.7 G/DL (ref 3.5–5.2)
ALP SERPL-CCNC: 169 U/L (ref 55–135)
ALT SERPL W/O P-5'-P-CCNC: 16 U/L (ref 10–44)
ANION GAP SERPL CALC-SCNC: 9 MMOL/L (ref 8–16)
AST SERPL-CCNC: 15 U/L (ref 10–40)
BILIRUB SERPL-MCNC: 0.5 MG/DL (ref 0.1–1)
BUN SERPL-MCNC: 14 MG/DL (ref 8–23)
CALCIUM SERPL-MCNC: 9.8 MG/DL (ref 8.7–10.5)
CHLORIDE SERPL-SCNC: 104 MMOL/L (ref 95–110)
CO2 SERPL-SCNC: 24 MMOL/L (ref 23–29)
CREAT SERPL-MCNC: 0.8 MG/DL (ref 0.5–1.4)
EST. GFR  (AFRICAN AMERICAN): >60 ML/MIN/1.73 M^2
EST. GFR  (NON AFRICAN AMERICAN): >60 ML/MIN/1.73 M^2
ESTIMATED AVG GLUCOSE: 180 MG/DL (ref 68–131)
GLUCOSE SERPL-MCNC: 144 MG/DL (ref 70–110)
HBA1C MFR BLD: 7.9 % (ref 4–5.6)
POTASSIUM SERPL-SCNC: 4.5 MMOL/L (ref 3.5–5.1)
PROT SERPL-MCNC: 6.9 G/DL (ref 6–8.4)
SODIUM SERPL-SCNC: 137 MMOL/L (ref 136–145)

## 2022-06-08 PROCEDURE — 36415 COLL VENOUS BLD VENIPUNCTURE: CPT | Mod: PO | Performed by: INTERNAL MEDICINE

## 2022-06-08 PROCEDURE — 80053 COMPREHEN METABOLIC PANEL: CPT | Performed by: INTERNAL MEDICINE

## 2022-06-08 PROCEDURE — 83036 HEMOGLOBIN GLYCOSYLATED A1C: CPT | Performed by: INTERNAL MEDICINE

## 2022-06-15 ENCOUNTER — TELEPHONE (OUTPATIENT)
Dept: ORTHOPEDICS | Facility: CLINIC | Age: 77
End: 2022-06-15
Payer: MEDICARE

## 2022-06-15 ENCOUNTER — OFFICE VISIT (OUTPATIENT)
Dept: FAMILY MEDICINE | Facility: CLINIC | Age: 77
End: 2022-06-15
Payer: MEDICARE

## 2022-06-15 VITALS
WEIGHT: 195.13 LBS | DIASTOLIC BLOOD PRESSURE: 78 MMHG | HEART RATE: 80 BPM | BODY MASS INDEX: 35.91 KG/M2 | HEIGHT: 62 IN | SYSTOLIC BLOOD PRESSURE: 138 MMHG | OXYGEN SATURATION: 97 %

## 2022-06-15 DIAGNOSIS — E11.29 TYPE 2 DIABETES MELLITUS WITH MICROALBUMINURIA, WITHOUT LONG-TERM CURRENT USE OF INSULIN: Primary | ICD-10-CM

## 2022-06-15 DIAGNOSIS — R80.9 TYPE 2 DIABETES MELLITUS WITH MICROALBUMINURIA, WITHOUT LONG-TERM CURRENT USE OF INSULIN: Primary | ICD-10-CM

## 2022-06-15 PROCEDURE — 99499 RISK ADDL DX/OHS AUDIT: ICD-10-PCS | Mod: S$GLB,,, | Performed by: INTERNAL MEDICINE

## 2022-06-15 PROCEDURE — 3078F DIAST BP <80 MM HG: CPT | Mod: CPTII,S$GLB,, | Performed by: INTERNAL MEDICINE

## 2022-06-15 PROCEDURE — 3051F HG A1C>EQUAL 7.0%<8.0%: CPT | Mod: CPTII,S$GLB,, | Performed by: INTERNAL MEDICINE

## 2022-06-15 PROCEDURE — 1101F PR PT FALLS ASSESS DOC 0-1 FALLS W/OUT INJ PAST YR: ICD-10-PCS | Mod: CPTII,S$GLB,, | Performed by: INTERNAL MEDICINE

## 2022-06-15 PROCEDURE — 3288F FALL RISK ASSESSMENT DOCD: CPT | Mod: CPTII,S$GLB,, | Performed by: INTERNAL MEDICINE

## 2022-06-15 PROCEDURE — 99499 UNLISTED E&M SERVICE: CPT | Mod: S$GLB,,, | Performed by: INTERNAL MEDICINE

## 2022-06-15 PROCEDURE — 3075F PR MOST RECENT SYSTOLIC BLOOD PRESS GE 130-139MM HG: ICD-10-PCS | Mod: CPTII,S$GLB,, | Performed by: INTERNAL MEDICINE

## 2022-06-15 PROCEDURE — 1159F MED LIST DOCD IN RCRD: CPT | Mod: CPTII,S$GLB,, | Performed by: INTERNAL MEDICINE

## 2022-06-15 PROCEDURE — 1159F PR MEDICATION LIST DOCUMENTED IN MEDICAL RECORD: ICD-10-PCS | Mod: CPTII,S$GLB,, | Performed by: INTERNAL MEDICINE

## 2022-06-15 PROCEDURE — 99999 PR PBB SHADOW E&M-EST. PATIENT-LVL III: ICD-10-PCS | Mod: PBBFAC,,, | Performed by: INTERNAL MEDICINE

## 2022-06-15 PROCEDURE — 3078F PR MOST RECENT DIASTOLIC BLOOD PRESSURE < 80 MM HG: ICD-10-PCS | Mod: CPTII,S$GLB,, | Performed by: INTERNAL MEDICINE

## 2022-06-15 PROCEDURE — 3075F SYST BP GE 130 - 139MM HG: CPT | Mod: CPTII,S$GLB,, | Performed by: INTERNAL MEDICINE

## 2022-06-15 PROCEDURE — 3288F PR FALLS RISK ASSESSMENT DOCUMENTED: ICD-10-PCS | Mod: CPTII,S$GLB,, | Performed by: INTERNAL MEDICINE

## 2022-06-15 PROCEDURE — 1125F PR PAIN SEVERITY QUANTIFIED, PAIN PRESENT: ICD-10-PCS | Mod: CPTII,S$GLB,, | Performed by: INTERNAL MEDICINE

## 2022-06-15 PROCEDURE — 99213 PR OFFICE/OUTPT VISIT, EST, LEVL III, 20-29 MIN: ICD-10-PCS | Mod: S$GLB,,, | Performed by: INTERNAL MEDICINE

## 2022-06-15 PROCEDURE — 1125F AMNT PAIN NOTED PAIN PRSNT: CPT | Mod: CPTII,S$GLB,, | Performed by: INTERNAL MEDICINE

## 2022-06-15 PROCEDURE — 99213 OFFICE O/P EST LOW 20 MIN: CPT | Mod: S$GLB,,, | Performed by: INTERNAL MEDICINE

## 2022-06-15 PROCEDURE — 1101F PT FALLS ASSESS-DOCD LE1/YR: CPT | Mod: CPTII,S$GLB,, | Performed by: INTERNAL MEDICINE

## 2022-06-15 PROCEDURE — 99999 PR PBB SHADOW E&M-EST. PATIENT-LVL III: CPT | Mod: PBBFAC,,, | Performed by: INTERNAL MEDICINE

## 2022-06-15 PROCEDURE — 3051F PR MOST RECENT HEMOGLOBIN A1C LEVEL 7.0 - < 8.0%: ICD-10-PCS | Mod: CPTII,S$GLB,, | Performed by: INTERNAL MEDICINE

## 2022-06-15 RX ORDER — TRIAMCINOLONE ACETONIDE 1 MG/ML
LOTION TOPICAL 2 TIMES DAILY
Qty: 60 ML | Refills: 1 | Status: SHIPPED | OUTPATIENT
Start: 2022-06-15 | End: 2022-12-14 | Stop reason: SDUPTHER

## 2022-06-15 NOTE — TELEPHONE ENCOUNTER
----- Message from Gerda Holly MA sent at 6/15/2022  4:07 PM CDT -----  Contact: Shawanda Desir  There patient would like to schedule her surgery, she got A1C down.  Please call the patient back at [phone]

## 2022-06-15 NOTE — PROGRESS NOTES
Subjective:       Patient ID: Shawanda Desir is a 76 y.o. female.    Chief Complaint: Follow-up (3 month f/u for her A1c. Didn't have knee sx due to A1c being out of range.)    Pt here for diabetes check up. She needed her a1c down below 8 before her knee replcaement. It is now 7.9! She has been taking her medication and modified her diet.  Blood pressure has been stable at home as well. No chest pain or sob.    Review of Systems   Constitutional: Negative for fever.   Respiratory: Negative for shortness of breath.    Cardiovascular: Negative for chest pain.   Neurological: Negative for headaches.         Objective:      Physical Exam  Constitutional:       Appearance: Normal appearance.   HENT:      Head: Normocephalic.   Cardiovascular:      Rate and Rhythm: Normal rate and regular rhythm.   Pulmonary:      Effort: Pulmonary effort is normal.      Breath sounds: Normal breath sounds.   Musculoskeletal:         General: Normal range of motion.      Cervical back: Normal range of motion.   Neurological:      General: No focal deficit present.      Mental Status: She is alert.   Psychiatric:         Mood and Affect: Mood normal.         Behavior: Behavior normal.         Assessment:       Problem List Items Addressed This Visit        Endocrine    Type 2 diabetes mellitus with microalbuminuria, without long-term current use of insulin - Primary          Plan:       Pt diabetes much improved. a1c now 7.9.  Recommend proceeding with knee replacemetn surgery.

## 2022-06-15 NOTE — TELEPHONE ENCOUNTER
Called pt to advise that I scheduled her an appointment to come in for Dr. Patel first available 8/16/22 @ 9:15 am to discuss R knee surgery and sign consents.  Pt did not answer.

## 2022-08-16 ENCOUNTER — OFFICE VISIT (OUTPATIENT)
Dept: ORTHOPEDICS | Facility: CLINIC | Age: 77
End: 2022-08-16
Payer: MEDICARE

## 2022-08-16 VITALS — WEIGHT: 195 LBS | BODY MASS INDEX: 35.88 KG/M2 | HEIGHT: 62 IN

## 2022-08-16 DIAGNOSIS — M17.11 PRIMARY OSTEOARTHRITIS OF RIGHT KNEE: Primary | ICD-10-CM

## 2022-08-16 PROCEDURE — 1101F PT FALLS ASSESS-DOCD LE1/YR: CPT | Mod: CPTII,S$GLB,, | Performed by: ORTHOPAEDIC SURGERY

## 2022-08-16 PROCEDURE — 1159F PR MEDICATION LIST DOCUMENTED IN MEDICAL RECORD: ICD-10-PCS | Mod: CPTII,S$GLB,, | Performed by: ORTHOPAEDIC SURGERY

## 2022-08-16 PROCEDURE — 1159F MED LIST DOCD IN RCRD: CPT | Mod: CPTII,S$GLB,, | Performed by: ORTHOPAEDIC SURGERY

## 2022-08-16 PROCEDURE — 99214 PR OFFICE/OUTPT VISIT, EST, LEVL IV, 30-39 MIN: ICD-10-PCS | Mod: S$GLB,,, | Performed by: ORTHOPAEDIC SURGERY

## 2022-08-16 PROCEDURE — 1125F PR PAIN SEVERITY QUANTIFIED, PAIN PRESENT: ICD-10-PCS | Mod: CPTII,S$GLB,, | Performed by: ORTHOPAEDIC SURGERY

## 2022-08-16 PROCEDURE — 99214 OFFICE O/P EST MOD 30 MIN: CPT | Mod: S$GLB,,, | Performed by: ORTHOPAEDIC SURGERY

## 2022-08-16 PROCEDURE — 99999 PR PBB SHADOW E&M-EST. PATIENT-LVL II: CPT | Mod: PBBFAC,,, | Performed by: ORTHOPAEDIC SURGERY

## 2022-08-16 PROCEDURE — 99999 PR PBB SHADOW E&M-EST. PATIENT-LVL II: ICD-10-PCS | Mod: PBBFAC,,, | Performed by: ORTHOPAEDIC SURGERY

## 2022-08-16 PROCEDURE — 1101F PR PT FALLS ASSESS DOC 0-1 FALLS W/OUT INJ PAST YR: ICD-10-PCS | Mod: CPTII,S$GLB,, | Performed by: ORTHOPAEDIC SURGERY

## 2022-08-16 PROCEDURE — 1125F AMNT PAIN NOTED PAIN PRSNT: CPT | Mod: CPTII,S$GLB,, | Performed by: ORTHOPAEDIC SURGERY

## 2022-08-16 PROCEDURE — 1160F PR REVIEW ALL MEDS BY PRESCRIBER/CLIN PHARMACIST DOCUMENTED: ICD-10-PCS | Mod: CPTII,S$GLB,, | Performed by: ORTHOPAEDIC SURGERY

## 2022-08-16 PROCEDURE — 1160F RVW MEDS BY RX/DR IN RCRD: CPT | Mod: CPTII,S$GLB,, | Performed by: ORTHOPAEDIC SURGERY

## 2022-08-16 PROCEDURE — 3288F PR FALLS RISK ASSESSMENT DOCUMENTED: ICD-10-PCS | Mod: CPTII,S$GLB,, | Performed by: ORTHOPAEDIC SURGERY

## 2022-08-16 PROCEDURE — 3288F FALL RISK ASSESSMENT DOCD: CPT | Mod: CPTII,S$GLB,, | Performed by: ORTHOPAEDIC SURGERY

## 2022-08-16 NOTE — H&P
Chief Complaint   Patient presents with    Right Knee - Pain         HPI:   This is a 76 y.o. who presents to clinic today complaining of right knee pain for 2 years after no known trauma. Pain is progressively worsening. No numbness or tingling. No associated signs or symptoms. She has failed NSAIDs, PT, and injections.     Past Medical History:   Diagnosis Date    Arthritis     Diabetes mellitus     Hypertension      Past Surgical History:   Procedure Laterality Date    BACK SURGERY      BREAST BIOPSY Right     neg--core    TRACHEOSTOMY      age 14     Current Outpatient Medications on File Prior to Visit   Medication Sig Dispense Refill    allopurinoL (ZYLOPRIM) 100 MG tablet TAKE 1 TABLET BY MOUTH EVERY DAY 90 tablet 0    amLODIPine (NORVASC) 10 MG tablet TAKE 1 TABLET BY MOUTH EVERY DAY 90 tablet 3    aspirin (ECOTRIN) 81 MG EC tablet Take 81 mg by mouth once daily.      atorvastatin (LIPITOR) 10 MG tablet TAKE 1 TABLET BY MOUTH EVERY DAY IN THE EVENING 90 tablet 3    glipiZIDE (GLUCOTROL) 5 MG TR24 Take 1 tablet (5 mg total) by mouth daily with breakfast. 90 tablet 3    meloxicam (MOBIC) 15 MG tablet TAKE 1 TABLET BY MOUTH EVERY DAY 30 tablet 11    metFORMIN (GLUCOPHAGE-XR) 500 MG ER 24hr tablet Take 1 tablet (500 mg total) by mouth 2 (two) times daily with meals. 180 tablet 3    olmesartan (BENICAR) 40 MG tablet TAKE 1 TABLET BY MOUTH EVERY DAY 90 tablet 3    oxybutynin (DITROPAN-XL) 5 MG TR24 TAKE 1 TABLET BY MOUTH EVERY DAY 90 tablet 0    triamcinolone acetonide 0.1% (KENALOG) 0.1 % Lotn Apply topically 2 (two) times daily. 60 mL 1     Current Facility-Administered Medications on File Prior to Visit   Medication Dose Route Frequency Provider Last Rate Last Admin    sodium chloride 0.9% flush 10 mL  10 mL Intravenous Q6H PRN Matthew Patel MD         Review of patient's allergies indicates:   Allergen Reactions    Clonidine      Causes chest tightness and leg swelling    Tradjenta  [linagliptin]      Chest tightness and leg swelling     Family History   Problem Relation Age of Onset    Cancer Mother     Cancer Father      Social History     Socioeconomic History    Marital status:    Tobacco Use    Smoking status: Never Smoker    Smokeless tobacco: Never Used   Substance and Sexual Activity    Alcohol use: No    Drug use: No     Social Determinants of Health     Food Insecurity: Food Insecurity Present    Worried About Running Out of Food in the Last Year: Sometimes true   Transportation Needs: Unknown    Lack of Transportation (Medical): No   Physical Activity: Inactive    Days of Exercise per Week: 0 days    Minutes of Exercise per Session: 0 min   Stress: No Stress Concern Present    Feeling of Stress : Not at all   Social Connections: Unknown    Frequency of Communication with Friends and Family: More than three times a week    Frequency of Social Gatherings with Friends and Family: Once a week    Active Member of Clubs or Organizations: Yes    Attends Club or Organization Meetings: More than 4 times per year    Marital Status:    Housing Stability: Low Risk     Unable to Pay for Housing in the Last Year: No    Number of Places Lived in the Last Year: 1    Unstable Housing in the Last Year: No       Review of Systems:  Constitutional:  Denies fever or chills   Eyes:  Denies change in visual acuity   HENT:  Denies nasal congestion or sore throat   Respiratory:  Denies cough or shortness of breath   Cardiovascular:  Denies chest pain or edema   GI:  Denies abdominal pain, nausea, vomiting, bloody stools or diarrhea   :  Denies dysuria   Integument:  Denies rash   Neurologic:  Denies headache, focal weakness or sensory changes   Endocrine:  Denies polyuria or polydipsia   Lymphatic:  Denies swollen glands   Psychiatric:  Denies depression or anxiety     Physical Exam:   Constitutional:  Well developed, well nourished, no acute distress, non-toxic  appearance   Integument:  Well hydrated, no rash   Lymphatic:  No lymphadenopathy noted   Neurologic:  Alert & oriented x 3, CN 2-12 normal, normal motor function, normal sensory function, no focal deficits noted   Psychiatric:  Speech and behavior appropriate   Eyes: EOMI  Gi: abdomen soft    Bilateral Knee Exam    right Knee Exam     Tenderness   The patient is experiencing tenderness in the medial joint line.    Range of Motion   Extension: 0   Flexion: 100     Muscle Strength     The patient has normal knee strength.    Tests   Aide:  Medial - positive   Lachman:  Anterior - negative      Varus: negative  Valgus: negative  Patellar Apprehension: negative    Other   Erythema: absent  Sensation: normal  Pulse: present  Swelling: mild      left Knee Exam   left knee exam performed same as contralateral side and is normal.            X-rays were performed, personally reviewed by me and findings discussed with the patient.  3 views of the right knee show tricompartmental degenerative change most pronounced in the medial compartment with Kellgren 3 changes    Primary osteoarthritis of right knee            I had a long discussion with the patient regarding the benefits and risks of right TKA. They voiced understanding and wish to proceed with surgery. Consents signed in clinic.

## 2022-08-29 ENCOUNTER — PATIENT MESSAGE (OUTPATIENT)
Dept: ADMINISTRATIVE | Facility: HOSPITAL | Age: 77
End: 2022-08-29
Payer: MEDICARE

## 2022-08-29 PROBLEM — M17.11 PRIMARY OSTEOARTHRITIS OF RIGHT KNEE: Status: ACTIVE | Noted: 2022-08-29

## 2022-08-30 ENCOUNTER — TELEPHONE (OUTPATIENT)
Dept: ORTHOPEDICS | Facility: CLINIC | Age: 77
End: 2022-08-30
Payer: MEDICARE

## 2022-08-30 DIAGNOSIS — R11.0 NAUSEA: Primary | ICD-10-CM

## 2022-08-30 PROCEDURE — G0180 MD CERTIFICATION HHA PATIENT: HCPCS | Mod: ,,, | Performed by: ORTHOPAEDIC SURGERY

## 2022-08-30 PROCEDURE — G0180 PR HOME HEALTH MD CERTIFICATION: ICD-10-PCS | Mod: ,,, | Performed by: ORTHOPAEDIC SURGERY

## 2022-08-30 RX ORDER — ONDANSETRON 8 MG/1
8 TABLET, ORALLY DISINTEGRATING ORAL ONCE
Qty: 20 TABLET | Refills: 0 | Status: SHIPPED | OUTPATIENT
Start: 2022-08-30 | End: 2022-08-30

## 2022-08-30 NOTE — TELEPHONE ENCOUNTER
Spoke with Rin from [facility] on behalf of pt and advise that Dr. Patel will send in Zofran to pt pharmacy and pt is to take rx every 6 hrs under the tongue for nausea .  Rin understood.

## 2022-08-30 NOTE — TELEPHONE ENCOUNTER
----- Message from Capri Cisse sent at 8/30/2022  9:19 AM CDT -----  Contact: Rin  Type:  Needs Medical Advice    Who Called: Rin with [facility]     Symptoms (please be specific):  nausea     How long has patient had these symptoms:  since surgery       Would the patient rather a call back or a response via [facility]? Call     Best Call Back Number: [phone] (home)      Rin home health nurse phone number- [phone] would like a call back as well.     Additional Information: Rin with [facility] is asking if patient can get a medication prescribed for nausea.     Please call to advise

## 2022-08-31 DIAGNOSIS — Z78.0 MENOPAUSE: ICD-10-CM

## 2022-09-12 DIAGNOSIS — M17.11 PRIMARY OSTEOARTHRITIS OF RIGHT KNEE: Primary | ICD-10-CM

## 2022-09-13 ENCOUNTER — OFFICE VISIT (OUTPATIENT)
Dept: ORTHOPEDICS | Facility: CLINIC | Age: 77
End: 2022-09-13
Payer: MEDICARE

## 2022-09-13 ENCOUNTER — HOSPITAL ENCOUNTER (OUTPATIENT)
Dept: RADIOLOGY | Facility: HOSPITAL | Age: 77
Discharge: HOME | End: 2022-09-13
Attending: ORTHOPAEDIC SURGERY
Payer: MEDICARE

## 2022-09-13 DIAGNOSIS — M17.11 PRIMARY OSTEOARTHRITIS OF RIGHT KNEE: ICD-10-CM

## 2022-09-13 DIAGNOSIS — M17.11 PRIMARY OSTEOARTHRITIS OF RIGHT KNEE: Primary | ICD-10-CM

## 2022-09-13 PROCEDURE — 1125F PR PAIN SEVERITY QUANTIFIED, PAIN PRESENT: ICD-10-PCS | Mod: CPTII,S$GLB,, | Performed by: ORTHOPAEDIC SURGERY

## 2022-09-13 PROCEDURE — 1125F AMNT PAIN NOTED PAIN PRSNT: CPT | Mod: CPTII,S$GLB,, | Performed by: ORTHOPAEDIC SURGERY

## 2022-09-13 PROCEDURE — 1159F MED LIST DOCD IN RCRD: CPT | Mod: CPTII,S$GLB,, | Performed by: ORTHOPAEDIC SURGERY

## 2022-09-13 PROCEDURE — 73560 XR KNEE ORTHO RIGHT: ICD-10-PCS | Mod: 26,LT,, | Performed by: RADIOLOGY

## 2022-09-13 PROCEDURE — 73562 XR KNEE ORTHO RIGHT: ICD-10-PCS | Mod: 26,RT,, | Performed by: RADIOLOGY

## 2022-09-13 PROCEDURE — 73562 X-RAY EXAM OF KNEE 3: CPT | Mod: 26,RT,, | Performed by: RADIOLOGY

## 2022-09-13 PROCEDURE — 73560 X-RAY EXAM OF KNEE 1 OR 2: CPT | Mod: 26,LT,, | Performed by: RADIOLOGY

## 2022-09-13 PROCEDURE — 99024 POSTOP FOLLOW-UP VISIT: CPT | Mod: S$GLB,,, | Performed by: ORTHOPAEDIC SURGERY

## 2022-09-13 PROCEDURE — 99999 PR PBB SHADOW E&M-EST. PATIENT-LVL III: CPT | Mod: PBBFAC,,, | Performed by: ORTHOPAEDIC SURGERY

## 2022-09-13 PROCEDURE — 99999 PR PBB SHADOW E&M-EST. PATIENT-LVL III: ICD-10-PCS | Mod: PBBFAC,,, | Performed by: ORTHOPAEDIC SURGERY

## 2022-09-13 PROCEDURE — 73560 X-RAY EXAM OF KNEE 1 OR 2: CPT | Mod: 59,TC,PO,LT

## 2022-09-13 PROCEDURE — 99024 PR POST-OP FOLLOW-UP VISIT: ICD-10-PCS | Mod: S$GLB,,, | Performed by: ORTHOPAEDIC SURGERY

## 2022-09-13 PROCEDURE — 1159F PR MEDICATION LIST DOCUMENTED IN MEDICAL RECORD: ICD-10-PCS | Mod: CPTII,S$GLB,, | Performed by: ORTHOPAEDIC SURGERY

## 2022-09-13 RX ORDER — OXYCODONE AND ACETAMINOPHEN 10; 325 MG/1; MG/1
1 TABLET ORAL EVERY 6 HOURS PRN
Qty: 44 TABLET | Refills: 0 | Status: SHIPPED | OUTPATIENT
Start: 2022-09-13 | End: 2023-02-28 | Stop reason: CLARIF

## 2022-09-13 RX ORDER — MELOXICAM 15 MG/1
15 TABLET ORAL DAILY
Qty: 30 TABLET | Refills: 11 | Status: SHIPPED | OUTPATIENT
Start: 2022-09-13 | End: 2022-10-17

## 2022-09-13 RX ORDER — METHOCARBAMOL 750 MG/1
750 TABLET, FILM COATED ORAL 4 TIMES DAILY PRN
Qty: 44 TABLET | Refills: 3 | Status: SHIPPED | OUTPATIENT
Start: 2022-09-13 | End: 2022-10-06 | Stop reason: SDUPTHER

## 2022-09-13 RX ORDER — TRAMADOL HYDROCHLORIDE 50 MG/1
50 TABLET ORAL EVERY 4 HOURS PRN
Qty: 44 TABLET | Refills: 0 | Status: SHIPPED | OUTPATIENT
Start: 2022-09-13 | End: 2022-10-06 | Stop reason: SDUPTHER

## 2022-09-13 NOTE — PROGRESS NOTES
Chief Complaint   Patient presents with    Right Knee - Post-op Evaluation, Pain       HPI:  76 y.o. female returns to clinic today status post right total knee arthroplasty 2 weeks ago. Pain is improving. Patient is compliant most of the time with restrictions.     right knee: ROM 10-95. Overall normal alignment. Incision healed. No erythema or fluctuance. Stable to stress. Skin intact. Compartments soft. NVI distally.     X-rays were performed today, personally reviewed by me and findings discussed with the patient.  3 views of the right knee show implants intact in good position    Primary osteoarthritis of right knee  -     Ambulatory referral/consult to Physical/Occupational Therapy; Future; Expected date: 09/20/2022    Other orders  -     methocarbamoL (ROBAXIN) 750 MG Tab; Take 1 tablet (750 mg total) by mouth 4 (four) times daily as needed.  Dispense: 44 tablet; Refill: 3  -     traMADoL (ULTRAM) 50 mg tablet; Take 1 tablet (50 mg total) by mouth every 4 (four) hours as needed for Pain.  Dispense: 44 tablet; Refill: 0  -     meloxicam (MOBIC) 15 MG tablet; Take 1 tablet (15 mg total) by mouth once daily.  Dispense: 30 tablet; Refill: 11  -     oxyCODONE-acetaminophen (PERCOCET)  mg per tablet; Take 1 tablet by mouth every 6 (six) hours as needed.  Dispense: 44 tablet; Refill: 0      Begin outpatient PT.

## 2022-09-15 ENCOUNTER — OFFICE VISIT (OUTPATIENT)
Dept: FAMILY MEDICINE | Facility: CLINIC | Age: 77
End: 2022-09-15
Payer: MEDICARE

## 2022-09-15 VITALS
DIASTOLIC BLOOD PRESSURE: 64 MMHG | OXYGEN SATURATION: 96 % | WEIGHT: 190.25 LBS | HEART RATE: 78 BPM | SYSTOLIC BLOOD PRESSURE: 126 MMHG | BODY MASS INDEX: 34.8 KG/M2

## 2022-09-15 DIAGNOSIS — E11.29 TYPE 2 DIABETES MELLITUS WITH MICROALBUMINURIA, WITHOUT LONG-TERM CURRENT USE OF INSULIN: ICD-10-CM

## 2022-09-15 DIAGNOSIS — E78.49 OTHER HYPERLIPIDEMIA: Primary | ICD-10-CM

## 2022-09-15 DIAGNOSIS — I10 ESSENTIAL HYPERTENSION: ICD-10-CM

## 2022-09-15 DIAGNOSIS — R80.9 TYPE 2 DIABETES MELLITUS WITH MICROALBUMINURIA, WITHOUT LONG-TERM CURRENT USE OF INSULIN: ICD-10-CM

## 2022-09-15 PROCEDURE — 3288F PR FALLS RISK ASSESSMENT DOCUMENTED: ICD-10-PCS | Mod: CPTII,S$GLB,, | Performed by: INTERNAL MEDICINE

## 2022-09-15 PROCEDURE — 1126F PR PAIN SEVERITY QUANTIFIED, NO PAIN PRESENT: ICD-10-PCS | Mod: CPTII,S$GLB,, | Performed by: INTERNAL MEDICINE

## 2022-09-15 PROCEDURE — 1101F PT FALLS ASSESS-DOCD LE1/YR: CPT | Mod: CPTII,S$GLB,, | Performed by: INTERNAL MEDICINE

## 2022-09-15 PROCEDURE — 1101F PR PT FALLS ASSESS DOC 0-1 FALLS W/OUT INJ PAST YR: ICD-10-PCS | Mod: CPTII,S$GLB,, | Performed by: INTERNAL MEDICINE

## 2022-09-15 PROCEDURE — 3074F PR MOST RECENT SYSTOLIC BLOOD PRESSURE < 130 MM HG: ICD-10-PCS | Mod: CPTII,S$GLB,, | Performed by: INTERNAL MEDICINE

## 2022-09-15 PROCEDURE — 3078F PR MOST RECENT DIASTOLIC BLOOD PRESSURE < 80 MM HG: ICD-10-PCS | Mod: CPTII,S$GLB,, | Performed by: INTERNAL MEDICINE

## 2022-09-15 PROCEDURE — 99214 PR OFFICE/OUTPT VISIT, EST, LEVL IV, 30-39 MIN: ICD-10-PCS | Mod: S$GLB,,, | Performed by: INTERNAL MEDICINE

## 2022-09-15 PROCEDURE — 3078F DIAST BP <80 MM HG: CPT | Mod: CPTII,S$GLB,, | Performed by: INTERNAL MEDICINE

## 2022-09-15 PROCEDURE — 1126F AMNT PAIN NOTED NONE PRSNT: CPT | Mod: CPTII,S$GLB,, | Performed by: INTERNAL MEDICINE

## 2022-09-15 PROCEDURE — 3288F FALL RISK ASSESSMENT DOCD: CPT | Mod: CPTII,S$GLB,, | Performed by: INTERNAL MEDICINE

## 2022-09-15 PROCEDURE — 99999 PR PBB SHADOW E&M-EST. PATIENT-LVL III: ICD-10-PCS | Mod: PBBFAC,,, | Performed by: INTERNAL MEDICINE

## 2022-09-15 PROCEDURE — 99999 PR PBB SHADOW E&M-EST. PATIENT-LVL III: CPT | Mod: PBBFAC,,, | Performed by: INTERNAL MEDICINE

## 2022-09-15 PROCEDURE — 99214 OFFICE O/P EST MOD 30 MIN: CPT | Mod: S$GLB,,, | Performed by: INTERNAL MEDICINE

## 2022-09-15 PROCEDURE — 3074F SYST BP LT 130 MM HG: CPT | Mod: CPTII,S$GLB,, | Performed by: INTERNAL MEDICINE

## 2022-09-15 NOTE — PROGRESS NOTES
Subjective:       Patient ID: Shawanda Desir is a 76 y.o. female.    Chief Complaint: Diabetes (Follow up)    Pt here for 3 month check up    Dm: sugars good fasting  on metfomrin ald glipizdie   Htn: stable on med  Knee oa- s/p knee replacement a few weeks ago. Recovering well    Review of Systems   Constitutional:  Negative for fever.   Respiratory:  Negative for shortness of breath.    Cardiovascular:  Negative for chest pain.   Neurological:  Negative for headaches.       Objective:      Physical Exam  Constitutional:       Appearance: Normal appearance.   HENT:      Head: Normocephalic.   Cardiovascular:      Rate and Rhythm: Normal rate and regular rhythm.   Pulmonary:      Effort: Pulmonary effort is normal.      Breath sounds: Normal breath sounds.   Musculoskeletal:         General: Normal range of motion.      Cervical back: Normal range of motion.   Neurological:      General: No focal deficit present.      Mental Status: She is alert.   Psychiatric:         Mood and Affect: Mood normal.         Behavior: Behavior normal.       Assessment:       Problem List Items Addressed This Visit          Cardiac/Vascular    Other hyperlipidemia - Primary    Essential hypertension       Endocrine    Type 2 diabetes mellitus with microalbuminuria, without long-term current use of insulin    Relevant Orders    Hemoglobin A1C    Microalbumin/Creatinine Ratio, Urine    Foot Exam Performed (Completed)       Plan:       Dm- check a1c, microalb. Get records from eye exam ([facility] in  feb or march)  Htn- stable on medication  Hyperlipidemia- stable

## 2022-09-16 ENCOUNTER — CLINICAL SUPPORT (OUTPATIENT)
Dept: REHABILITATION | Facility: HOSPITAL | Age: 77
End: 2022-09-16
Attending: ORTHOPAEDIC SURGERY
Payer: MEDICARE

## 2022-09-16 DIAGNOSIS — M62.81 MUSCLE WEAKNESS: ICD-10-CM

## 2022-09-16 DIAGNOSIS — M25.60 DECREASED RANGE OF MOTION: ICD-10-CM

## 2022-09-16 DIAGNOSIS — M25.561 CHRONIC PAIN OF RIGHT KNEE: Primary | ICD-10-CM

## 2022-09-16 DIAGNOSIS — R26.2 DIFFICULTY WALKING: ICD-10-CM

## 2022-09-16 DIAGNOSIS — G89.29 CHRONIC PAIN OF RIGHT KNEE: Primary | ICD-10-CM

## 2022-09-16 DIAGNOSIS — M17.11 PRIMARY OSTEOARTHRITIS OF RIGHT KNEE: ICD-10-CM

## 2022-09-16 PROCEDURE — 97161 PT EVAL LOW COMPLEX 20 MIN: CPT | Mod: PN | Performed by: PHYSICAL THERAPIST

## 2022-09-16 PROCEDURE — 97110 THERAPEUTIC EXERCISES: CPT | Mod: PN | Performed by: PHYSICAL THERAPIST

## 2022-09-16 NOTE — PLAN OF CARE
[facility]  Physical Therapy Initial Evaluation    Name: Shawanda Desir  Clinic Number: [id number]    Therapy Diagnosis:   Encounter Diagnoses   Name Primary?    Primary osteoarthritis of right knee     Chronic pain of right knee Yes    Decreased range of motion     Muscle weakness     Difficulty walking      Physician: Matthew Patel MD    Physician Orders: PT Eval and Treat     DOS 8-29-22  Medical Diagnosis from Referral: Primary OA R knee  Evaluation Date: 9/16/2022  Authorization Period Expiration: 12-31-22  Plan of Care Expiration: 11-11-22  Progress Note Due: 10-16-22  Visit # / Visits authorized: 1/ 1  FOTO: Issued Visit #: 1    MD Follow up appointment: 10-25-22    Precautions: Diabetes and OA L knee, prior lumbar surgery    Time In: 10:50  Time Out: 11:35  Total Billable Time: 45 minutes    Subjective   Date of onset: 8-29-22  History of current condition - Shawanda reports: both knees have bad arthritis.. Had R done first with TKR, plan to do L next year.  Pt stayed in hospital.  Pt states she went home next day and had HHPT.  Pt states she has been working on HEP and using ice.  Pt states she has help at home Pt with [facility] back surgery, fusion.  Pt states no back pain currently.  Pt states she was having a lot of pain prior to getting staples removed, feeling better since staples out      Medical History:   Past Medical History:   Diagnosis Date    Anticoagulant long-term use     Arthritis     Diabetes mellitus     Hypertension        Surgical History:   Shawanda Desir  has a past surgical history that includes Back surgery; Breast biopsy (Right); Tracheostomy; and robotic arthroplasty, knee (Right, 8/29/2022).    Medications:   Shawanda has a current medication list which includes the following prescription(s): acetaminophen, allopurinol, amlodipine, aspirin, atorvastatin, glipizide, ibuprofen, meloxicam, metformin, methocarbamol, olmesartan, oxybutynin, oxycodone,  oxycodone-acetaminophen, pregabalin, tramadol, and triamcinolone acetonide 0.1%.    Allergies:   Review of patient's allergies indicates:   Allergen Reactions    Clonidine      Causes chest tightness and leg swelling    Tradjenta [linagliptin]      Chest tightness and leg swelling        Imaging, x:ray: Right knee arthroplasty without complication.    Prior Therapy: had PT prior to surgery, had HHPT and is discharged from   Social History: friend [place] staying with pt helping her as she recovers.  No stairs, Reports no history of falls and using straight cane at home  Occupation: retired pt was   Prior Level of Function: limited with walking and standing  Current Level of Function: limited with walking and standing  Exercise for Wellness: no    Pain:  Current 3/10, worst 8/10, best 2/10  still taking pain pills  Location: right knee  Description: Aching and Burning  Aggravating Factors: Standing and Walking  Easing Factors: pain medication  Sleep is not disturbed takes pain pill     Pts goals: get walking again get back to normal    Objective     Postural examination in standing:  - forward head  - forward shoulders  - R knee slight flexion       Postural examination in sitting:   - forward head  - forward shoulders  - knee positioned relaxed       Functional assessment:   - walking/gait:forward bend posturing of trunk ambulates flat foot with decreased knee mobility with gait B   - sit to stand: significant use of hands  - sit to supine: assists with hand to lift R LE to supine        - supine to sit: assists transfer R LE with hand  - supine to prone: N/T     Knee Girth: (measured in centimeters)   Knee RLE LLE   Mid joint 45.7 41.5   Suprapatella 46.7 44.5   Infrapatella 40.4 38.5   Mid calf 31.3 33.0       Knee  ROM: (measured in degrees)   Knee (R) (L)   Flexion 90 110   Extension -15 -20         Flexibility testing:  - hamstrings:     90/90 test R 30 L 30           - gastrocnemius:   DF  ankle R 5 degrees L 5 degrees  - hip flexors severe tightness and unable to achieve hip extension neutral while side lying      Muscle Strength  MMT R L   Hip flexion 3-/5 4-/5   Hip extension 25% bridge     Knee extension 3-/5 5/5   Knee flexion 4-/5 4/5     Endurance is poor.    Special tests: incision appears healed with steristrips throughout incision no increased redness or excessive warmth noted and no TTP    Palpation: no TTP    Joint mobility: min-mod decreased patella glide inferior/superior , min decreased med/lateral    Sensation: Intact    CMS Impairment/Limitation/Restriction for FOTO knee Survey    Therapist reviewed FOTO scores for Shawanda Desir on 9/16/2022.   FOTO documents entered into [employer] - see Media section.    Limitation Score: 99%       TREATMENT   Treatment Time In: 11:25  Treatment Time Out: 11:35  Total Treatment time separate from Evaluation: 10 minutes    Shawanda received therapeutic exercises to develop strength, endurance, ROM, flexibility, and core stabilization for 10 minutes including:  Reviewed HEP  Ankle pumps x 10  Quad sets x 10  Heel slides x 10  SLR x 10  Added Bridge x 10     Heel raises  Hip abd standing  Knee ext prop    Home Exercises and Patient Education Provided    Education provided:   - HEP   - stretch to point of tightness not pain   - exercise in pain free zone     Written Home Exercises Provided: Yes   Exercises were reviewed and Shawanda was able to demonstrate them prior to the end of the session.  Shawanda demonstrated good  understanding of the education provided.     See EMR under Patient Instructions for exercises provided 9/16/2022.    Assessment   Shawanda is a 76 y.o. female referred to outpatient Physical Therapy with a medical diagnosis of OA R knee. Pt presents with s/p TKR R with loss of ROM and strength.  Pt with gait dysfunctions noted with ambulation with walker which may partially be related to lumbar issue.  Pt with difficulty standing erect and  noted severe hip flexor tightness.  Pt with flexion contracture of B knees L >R. Pt appears to have been working on knee extension with knee ext prop, but will require further treatment to improve knee ext B      Pt prognosis is Fair.   Pt will benefit from skilled outpatient Physical Therapy to address the deficits stated above and in the chart below, provide pt/family education, and to maximize pt's level of independence.     Plan of care discussed with patient: Yes  Pt's spiritual, cultural and educational needs considered and patient is agreeable to the plan of care and goals as stated below:     Anticipated Barriers for therapy: relies on others for transportation    Medical Necessity is demonstrated by the following  History  Co-morbidities and personal factors that may impact the plan of care Co-morbidities:   advanced age, diabetes, prior lumbar surgery, and OA of L knee    Personal Factors:   no deficits     high   Examination  Body Structures and Functions, activity limitations and participation restrictions that may impact the plan of care Body Regions:   back  lower extremities  trunk    Body Systems:    ROM  strength  balance  gait  transfers    Participation Restrictions:   ADL    Activity limitations:   Learning and applying knowledge  no deficits    General Tasks and Commands  no deficits    Communication  no deficits    Mobility  walking  driving (bike, car, motorcycle)    Self care  washing oneself (bathing, drying, washing hands)  dressing    Domestic Life  shopping  cooking  doing house work (cleaning house, washing dishes, laundry)  assisting others    Interactions/Relationships  no deficits    Life Areas  no deficits    Community and Social Life  no deficits         high   Clinical Presentation stable and uncomplicated low   Decision Making/ Complexity Score: low     GOALS:   Short Term Goals:  4 weeks  Increase range of motion 25%  Increase strength 1/2 muscle grade  Be able to perform HEP  with minimal cueing required  Restore ability to ambulate with normal pain free gait with straight cane    Long Term Goals: 8 weeks  Increase range of motion to 50-75% full   Improve muscle strength 1 muscle grade  Restore ability to ambulate with normal pain free gait no device  Walking for ADL will be restored without increased pain  Restore ability to stand for ADL without increased pain  Restore ability to drive  Restore ability to perform sit to stand transfer without increased pain  Restore ability to perform ADL's and household activities independently and without increased pain    Plan   Plan of care Certification: 9/16/2022 to 11-11-22.    Outpatient Physical Therapy 2 times weekly for 8 weeks to include the following interventions: Therapeutic exercises, manual therapy techniques neuromuscular re-education, therapeutic activities, modalities such as moist heat, ice, ultrasound and electrical stimulation will be considered and utilized as needed.      Anastasia Bass, PT    I certify the need for these services furnished under this plan of treatment and while under my care.    ____________________________________ Physician/Referring Practitioner                                  Date of Signature

## 2022-09-19 ENCOUNTER — CLINICAL SUPPORT (OUTPATIENT)
Dept: REHABILITATION | Facility: HOSPITAL | Age: 77
End: 2022-09-19
Payer: MEDICARE

## 2022-09-19 DIAGNOSIS — M25.60 DECREASED RANGE OF MOTION: ICD-10-CM

## 2022-09-19 DIAGNOSIS — M25.561 CHRONIC PAIN OF RIGHT KNEE: Primary | ICD-10-CM

## 2022-09-19 DIAGNOSIS — R26.2 DIFFICULTY WALKING: ICD-10-CM

## 2022-09-19 DIAGNOSIS — G89.29 CHRONIC PAIN OF RIGHT KNEE: Primary | ICD-10-CM

## 2022-09-19 DIAGNOSIS — M62.81 MUSCLE WEAKNESS: ICD-10-CM

## 2022-09-19 PROCEDURE — 97140 MANUAL THERAPY 1/> REGIONS: CPT | Mod: PN | Performed by: PHYSICAL THERAPIST

## 2022-09-19 PROCEDURE — 97110 THERAPEUTIC EXERCISES: CPT | Mod: PN | Performed by: PHYSICAL THERAPIST

## 2022-09-19 NOTE — PROGRESS NOTES
[facility]   Physical Therapy Treatment Note     Name: Shawanda Desir  Clinic Number: [id number]    Therapy Diagnosis:   Encounter Diagnoses   Name Primary?    Chronic pain of right knee Yes    Decreased range of motion     Muscle weakness     Difficulty walking      Physician: Matthew Patel MD    Visit Date: 9/19/2022    Physician Orders: PT Eval and Treat      DOS 8-29-22  Medical Diagnosis from Referral: Primary OA R knee  Evaluation Date: 9/16/2022  Authorization Period Expiration: 10-14-22  Plan of Care Expiration: 11-11-22  Progress Note Due: 10-14-22  Visit # / Visits authorized: 2/ 12  FOTO: Issued Visit #: 1     MD Follow up appointment: 10-25-22     Precautions: Diabetes and OA L knee, prior lumbar surgery    PTA Visit #: 0/5     Time In: 10:01  Time Out: 10:55  Total Billable Time: 54 minutes    SUBJECTIVE     Pt reports: she took pain medicine prior to PT>  Pt states she has been doing ok  using ice at home Pt walking with no device at home and no pain.  Pt was compliant with home exercise program.  Response to previous treatment: has some soreness  Functional change: walking better     Pain: 2/10 After treatment pt reported she felt alright but did not provide number  Location: right knee    OBJECTIVE     Knee  ROM: (measured in degrees) 9-19-22 prior to treatment   Knee (R)   Flexion 90   Extension -13     Knee  ROM: (measured in degrees) 9-19-22 after treatment  Knee (R)   Flexion 100   Extension -7       Knee  ROM: (measured in degrees) initial eval  Knee (R) (L)   Flexion 90 110   Extension -15 -20         Treatment     Shawanda received the treatments listed below:      Shawanda received therapeutic exercises to develop strength, endurance, ROM, flexibility, and core stabilization for 46 minutes including:  Reviewed HEP    Heel slides x 10 3 count hold    Heel slides with strap x 2/10 5 count hold   SLR x 10  Bridge x 10      HSS long sitting on mat x 10    GSS long  "sitting on mat x 10    Knee flexion sitting x 2 min     Heel raises 2/10  Hip abd standing 2/10   Hip extension leaning on 4" step with pillow x 10    Knee ext prop with cervical ice pack wrapped around knee x 2 min with back elevated and 2# on top of knee    manual therapy techniques: Joint mobilizations were applied to the: R patella for 8 minutes, including:  Med/lat and sup/inf glides grade 1-3 STM HS and quad Noted still some scabbing with scar and did not perform STM to scar yet.  Instructed pt again in precautions with healing incision.     Pt to ice at home     Patient Education and Home Exercises     Home Exercises Provided and Patient Education Provided     Education provided:   - heel strike with gait to encourage knee extension  - HEP   - stretch to point of tightness not pain   - exercise in pain free zone       Written Home Exercises Provided: yes. Exercises were reviewed and Shawanda was able to demonstrate them prior to the end of the session.  Shawanda demonstrated good  understanding of the education provided. See EMR under Patient Instructions for exercises provided during therapy sessions    ASSESSMENT     Pt ambulating with no device or straight cane.  As pt ambulated in clinic after knee ext stretch improved knee extension noted with mid stance on L also.  Pt still with foot flat and no heel strike but with transition L knee extended better along with R. Pt continues to exhibit tightness in hip flexors resulting in FB trunk with gait.    Pt able to further improve flexion also with stretching exercises.  Pt tolerates sitting up with legs extended well so able to handle HS and GSS long sitting well.  To help with pt comfort would alternate activities between sitting and on mat and pt appeared to handle well.      Shawanda Is progressing well towards her goals.   Pt prognosis is Fair.     Pt will continue to benefit from skilled outpatient physical therapy to address the goals listed in the initial " evaluation, provide pt/family education and to maximize pt's level of independence in the home and community environment.     Pt's spiritual, cultural and educational needs considered and pt agreeable to plan of care and goals.     Anticipated barriers to physical therapy: relies on others for transportation, lack of knee extension B    GOALS:   Short Term Goals:  4 weeks  Increase range of motion 25%  Increase strength 1/2 muscle grade  Be able to perform HEP with minimal cueing required  Restore ability to ambulate with normal pain free gait with straight cane     Long Term Goals: 8 weeks  Increase range of motion to 50-75% full   Improve muscle strength 1 muscle grade  Restore ability to ambulate with normal pain free gait no device  Walking for ADL will be restored without increased pain  Restore ability to stand for ADL without increased pain  Restore ability to drive  Restore ability to perform sit to stand transfer without increased pain  Restore ability to perform ADL's and household activities independently and without increased pain    PLAN   Continue with established plan of care towards PT goals.      Continue focus on ROM  Progress strengthening as tolerated.      Anastasia Bass, PT

## 2022-09-22 ENCOUNTER — EXTERNAL HOME HEALTH (OUTPATIENT)
Dept: HOME HEALTH SERVICES | Facility: HOSPITAL | Age: 77
End: 2022-09-22
Payer: MEDICARE

## 2022-09-22 ENCOUNTER — CLINICAL SUPPORT (OUTPATIENT)
Dept: REHABILITATION | Facility: HOSPITAL | Age: 77
End: 2022-09-22
Payer: MEDICARE

## 2022-09-22 DIAGNOSIS — M25.561 CHRONIC PAIN OF RIGHT KNEE: Primary | ICD-10-CM

## 2022-09-22 DIAGNOSIS — G89.29 CHRONIC PAIN OF RIGHT KNEE: Primary | ICD-10-CM

## 2022-09-22 DIAGNOSIS — M25.60 DECREASED RANGE OF MOTION: ICD-10-CM

## 2022-09-22 DIAGNOSIS — R26.2 DIFFICULTY WALKING: ICD-10-CM

## 2022-09-22 DIAGNOSIS — M62.81 MUSCLE WEAKNESS: ICD-10-CM

## 2022-09-22 PROCEDURE — 97140 MANUAL THERAPY 1/> REGIONS: CPT | Mod: PN,CQ

## 2022-09-22 PROCEDURE — 97110 THERAPEUTIC EXERCISES: CPT | Mod: PN,CQ

## 2022-09-22 NOTE — PROGRESS NOTES
"[facility]   Physical Therapy Treatment Note     Name: Shawanda Desir  Clinic Number: [id number]    Therapy Diagnosis:   Encounter Diagnoses   Name Primary?    Chronic pain of right knee Yes    Decreased range of motion     Muscle weakness     Difficulty walking        Physician: Matthew Patel MD    Visit Date: 9/22/2022    Physician Orders: PT Eval and Treat      DOS 8-29-22  Medical Diagnosis from Referral: Primary OA R knee  Evaluation Date: 9/16/2022  Authorization Period Expiration: 10-14-22  Plan of Care Expiration: 11-11-22  Progress Note Due: 10-14-22  Visit # / Visits authorized: 2/ 12  FOTO: Issued Visit #: 1     MD Follow up appointment: 10-25-22     Precautions: Diabetes and OA L knee, prior lumbar surgery    PTA Visit #: 1/5     Time In: 0932 (Late arrival)  Time Out: 1026  Total Billable Time: 53 minutes      SUBJECTIVE     Pt reports: feeling "great" today. Woke up at 2 or 3 this morning and did some walking but did not pain. Hasn't used cold pack in two days since her pain and swelling have improved.     Pt was compliant with home exercise program two or three time a day. .  Response to previous treatment: has some soreness  Functional change: walking better     Pain: 0/10 After treatment pt reported she felt alright but did not provide number  Location: right knee    OBJECTIVE       /73, HR 86, O2 sat 97%    Knee  ROM: (measured in degrees) 9-19-22 prior to treatment   Knee (R)   Flexion 89   Extension -15     Knee  ROM: (measured in degrees) 9-19-22 after treatment  Knee (R)   Flexion 101   Extension -7       Knee  ROM: (measured in degrees) initial eval  Knee (R) (L)   Flexion 90 110   Extension -15 -20         Treatment     Shawanda received the treatments listed below:      Shawanda received therapeutic exercises to develop strength, endurance, ROM, flexibility, and core stabilization for 45 minutes including:  Reviewed HEP    Heel slides x 10 3 count hold   Heel " "slides with strap x 2/10 5 count hold   TKE with rolled towel under knee x 20, hold 5s  SLR 2 x 10  Bridge 2 x 10     HSS long sitting on mat x 10   GSS long sitting on mat x 10    Knee flexion sitting x 2 min     Heel raises 2/10  Hip abd standing 2/10  Hip extension leaning on 4" step with pillow x 10    Knee ext prop with cervical ice pack wrapped around knee x 2 min with back elevated and 2# on top of knee-NP (did not return to mat after nausea episode)    manual therapy techniques: Joint mobilizations were applied to the: R patella for 8 minutes, including:  Med/lat and sup/inf glides grade 1-3 STM HS and quad Noted still some scabbing with scar and did not perform STM to scar yet.  Instructed pt again in precautions with healing incision.     Pt to ice at home     Patient Education and Home Exercises     Home Exercises Provided and Patient Education Provided     Education provided:   - heel strike with gait to encourage knee extension  - HEP   - stretch to point of tightness not pain   - exercise in pain free zone   -reviewed importance of CP usage for pain and inflammation control, as her therapy will progress and become more intense as she heals.       Written Home Exercises Provided: Instructed patient to continue current home exercise program.    Exercises were reviewed and Shawanda was able to demonstrate them prior to the end of the session.  Shawanda demonstrated good  understanding of the education provided. See EMR under Patient Instructions for exercises provided during therapy sessions    ASSESSMENT     Improved pain since last session. Reminded pt of importance of utilizing CP for inflammation control, as her exercises will progress. Began feeling nauseated with supine exercises, so sat up in chair and checked vitals. BP elevated, compared to her last reading with MD, but WFL. Nausea subsided and pt stated she felt like she could continue her exercises in sitting and standing, so resumed without " return of symptoms. States she felt like the nausea may be been a result of her low back pain, but denied low back pain. ROM slowly increasing with manual therapy but did have knee discomfort with seated heelslides but did not give numerical rating. Requires changes of position during therapy sessions to prevent low back pain. Cues for increased DF and TKE for heel strike during gait with good correction within ROM limitations. Reminders of sequencing with spc.  Will benefit from continued physical therapy intervention to progress toward goals set forth in plan of care to improve functional mobility and quality of life.       Shawanda Is progressing well towards her goals.   Pt prognosis is Fair.     Pt will continue to benefit from skilled outpatient physical therapy to address the goals listed in the initial evaluation, provide pt/family education and to maximize pt's level of independence in the home and community environment.     Pt's spiritual, cultural and educational needs considered and pt agreeable to plan of care and goals.     Anticipated barriers to physical therapy: relies on others for transportation, lack of knee extension B    GOALS:   Short Term Goals:  4 weeks  ongoing  Increase range of motion 25%  Increase strength 1/2 muscle grade  Be able to perform HEP with minimal cueing required  Restore ability to ambulate with normal pain free gait with straight cane     Long Term Goals: 8 weeks  ongoing  Increase range of motion to 50-75% full   Improve muscle strength 1 muscle grade  Restore ability to ambulate with normal pain free gait no device  Walking for ADL will be restored without increased pain  Restore ability to stand for ADL without increased pain  Restore ability to drive  Restore ability to perform sit to stand transfer without increased pain  Restore ability to perform ADL's and household activities independently and without increased pain    PLAN   Continue with established plan of care  towards PT goals.      Continue focus on ROM  Progress strengthening as tolerated.      Alycia Walsh, PTA

## 2022-09-27 ENCOUNTER — CLINICAL SUPPORT (OUTPATIENT)
Dept: REHABILITATION | Facility: HOSPITAL | Age: 77
End: 2022-09-27
Payer: MEDICARE

## 2022-09-27 DIAGNOSIS — M25.561 CHRONIC PAIN OF RIGHT KNEE: Primary | ICD-10-CM

## 2022-09-27 DIAGNOSIS — G89.29 CHRONIC PAIN OF RIGHT KNEE: Primary | ICD-10-CM

## 2022-09-27 DIAGNOSIS — M62.81 MUSCLE WEAKNESS: ICD-10-CM

## 2022-09-27 DIAGNOSIS — M25.60 DECREASED RANGE OF MOTION: ICD-10-CM

## 2022-09-27 DIAGNOSIS — R26.2 DIFFICULTY WALKING: ICD-10-CM

## 2022-09-27 PROCEDURE — 97140 MANUAL THERAPY 1/> REGIONS: CPT | Mod: PN,CQ

## 2022-09-27 PROCEDURE — 97110 THERAPEUTIC EXERCISES: CPT | Mod: PN,CQ

## 2022-09-27 NOTE — PROGRESS NOTES
[facility]   Physical Therapy Treatment Note     Name: Shawanda Desir  Clinic Number: [id number]    Therapy Diagnosis:   Encounter Diagnoses   Name Primary?    Chronic pain of right knee Yes    Decreased range of motion     Muscle weakness     Difficulty walking        Physician: Matthew Patel MD    Visit Date: 9/27/2022    Physician Orders: PT Eval and Treat      DOS 8-29-22  Medical Diagnosis from Referral: Primary OA R knee  Evaluation Date: 9/16/2022  Authorization Period Expiration: 10-14-22  Plan of Care Expiration: 11-11-22  Progress Note Due: 10-14-22  Visit # / Visits authorized: 3/ 12  FOTO: Issued Visit #: 1     MD Follow up appointment: 10-25-22     Precautions: Diabetes and OA L knee, prior lumbar surgery    PTA Visit #: 2/5     Time In: 0905  Time Out: 0950  Total Billable Time: 40 minutes      SUBJECTIVE     Pt reports: 2-3/10 pain today and walking with her spc on right side. States it is easier to get up and go to the bathroom and put her pants and shoes on now. States one of her friends told her to not walk on her leg and to keep weight off of it until it is healed. States another friend told her she doesn't need to go to therapy to get her knee better.     Pt was compliant with home exercise program two or three time a day.   Response to previous treatment: has some soreness  Functional change: Easier to get up and go to the bathroom and put her pants and shoes on.     Pain: 2-3/10   Location: right knee    OBJECTIVE       NP-/73, HR 86, O2 sat 97%    Knee  ROM: (measured in degrees) 9-27-22 prior to treatment   Knee (R)   Flexion 90   Extension -10     Knee  ROM: (measured in degrees) 9-27-22 after treatment  Knee (R)   Flexion 105   Extension -7       Knee  ROM: (measured in degrees) initial eval  Knee (R) (L)   Flexion 90 110   Extension -15 -20         Treatment     Shawanda received the treatments listed below:      Shawanda received therapeutic exercises to  "develop strength, endurance, ROM, flexibility, and core stabilization for 32 minutes including:  Reviewed HEP    Heel slides x 10 3 count hold   Heel slides with strap x 2/10 5 count hold   TKE with rolled towel under knee x 20, hold 5s  SLR 2 x 10  Bridge 2 x 10 -NP time    HSS long sitting on mat x 10   GSS long sitting on mat x 10    Knee flexion sitting x 2 min     Heel raises 2/10  Hip abd standing 2/10  Hip extension leaning on 4" step with pillow 2 x 10-end of mat with HOB incline today    Knee ext prop with cervical ice pack wrapped around knee x 2 min with back elevated and 2# on top of knee-NP time    Instruction of gait with spc in left hand and sequencing in gym and to car.     manual therapy techniques: Joint mobilizations were applied to the: R patella for 8 minutes, including:  Med/lat and sup/inf glides grade 1-3 STM HS and quad Noted still some scabbing with scar and did not perform STM to scar yet.  Instructed pt again in precautions with healing incision    Pt to ice at home     Patient Education and Home Exercises     Home Exercises Provided and Patient Education Provided     Education provided:   - heel strike with gait to encourage knee extension  - HEP   - stretch to point of tightness not pain   - exercise in pain free zone   -reviewed importance of CP usage for pain and inflammation control, as her therapy will progress and become more intense as she heals.   -explained importance of PT for healing and safety      Written Home Exercises Provided: Instructed patient to continue current home exercise program.    Exercises were reviewed and Shawanda was able to demonstrate them prior to the end of the session.  Shawanda demonstrated good  understanding of the education provided. See EMR under Patient Instructions for exercises provided during therapy sessions    ASSESSMENT     Minimal pain right knee. Improving edema. Minimal scabbing along incision with peeling skin at lateral knee. Extra time " for encouragement to continue her previous sets/reps, as pt had decreased motivation once she became fatigued.  Strength deficit in VMO which fatigues quickly. Reminded pt of importance of utilizing CP for inflammation control, as her exercises will progress. Pain improved after session but time was limited with exercises 2* extra time explaining to pt the importance of exercise for healing. Time spent on gait with sequencing and using spc in left hand for proper weight shifting and safety. Will benefit from continued physical therapy intervention to progress toward goals set forth in plan of care to improve functional mobility and quality of life.       Shawanda Is progressing well towards her goals.   Pt prognosis is Fair.     Pt will continue to benefit from skilled outpatient physical therapy to address the goals listed in the initial evaluation, provide pt/family education and to maximize pt's level of independence in the home and community environment.     Pt's spiritual, cultural and educational needs considered and pt agreeable to plan of care and goals.     Anticipated barriers to physical therapy: relies on others for transportation, lack of knee extension B    GOALS:   Short Term Goals:  4 weeks  ongoing  Increase range of motion 25%  Increase strength 1/2 muscle grade  Be able to perform HEP with minimal cueing required  Restore ability to ambulate with normal pain free gait with straight cane     Long Term Goals: 8 weeks  ongoing  Increase range of motion to 50-75% full   Improve muscle strength 1 muscle grade  Restore ability to ambulate with normal pain free gait no device  Walking for ADL will be restored without increased pain  Restore ability to stand for ADL without increased pain  Restore ability to drive  Restore ability to perform sit to stand transfer without increased pain  Restore ability to perform ADL's and household activities independently and without increased pain    PLAN   Continue  with established plan of care towards PT goals.      Continue focus on ROM  Progress strengthening as tolerated.      Alycia Walsh, PTA

## 2022-09-29 ENCOUNTER — CLINICAL SUPPORT (OUTPATIENT)
Dept: REHABILITATION | Facility: HOSPITAL | Age: 77
End: 2022-09-29
Payer: MEDICARE

## 2022-09-29 DIAGNOSIS — M25.60 DECREASED RANGE OF MOTION: ICD-10-CM

## 2022-09-29 DIAGNOSIS — M25.561 CHRONIC PAIN OF RIGHT KNEE: Primary | ICD-10-CM

## 2022-09-29 DIAGNOSIS — G89.29 CHRONIC PAIN OF RIGHT KNEE: Primary | ICD-10-CM

## 2022-09-29 DIAGNOSIS — R26.2 DIFFICULTY WALKING: ICD-10-CM

## 2022-09-29 DIAGNOSIS — M62.81 MUSCLE WEAKNESS: ICD-10-CM

## 2022-09-29 PROCEDURE — 97140 MANUAL THERAPY 1/> REGIONS: CPT | Mod: PN,CQ

## 2022-09-29 PROCEDURE — 97110 THERAPEUTIC EXERCISES: CPT | Mod: PN,CQ

## 2022-09-29 NOTE — PROGRESS NOTES
[facility]   Physical Therapy Treatment Note     Name: Shawanda Desir  Clinic Number: [id number]    Therapy Diagnosis:   Encounter Diagnoses   Name Primary?    Chronic pain of right knee Yes    Decreased range of motion     Muscle weakness     Difficulty walking        Physician: Matthew Patel MD    Visit Date: 9/29/2022    Physician Orders: PT Eval and Treat      DOS 8-29-22  Medical Diagnosis from Referral: Primary OA R knee  Evaluation Date: 9/16/2022  Authorization Period Expiration: 10-14-22  Plan of Care Expiration: 11-11-22  Progress Note Due: 10-14-22  Visit # / Visits authorized: 4/ 12  FOTO: Issued Visit #: 1  FOTO next session if possible     MD Follow up appointment: 10-25-22     Precautions: Diabetes and OA L knee, prior lumbar surgery    PTA Visit #: 3/5     Time In: 1000  Time Out: 1045  Total Billable Time: 45 minutes      SUBJECTIVE     Pt reports: 010 pain today and walking with her spc on right side. No pain at present and states she doesn't use her spc at home. Also states her sister and her friend had only a couple of visits with therapy after their TKAs and did not have to complete as their knees healed on their own. States she shouldn't have any discomfort with her therapy and that her knee needs to relax to heal and she should not be performing exercises regularly or walking on her leg much, despite instruction from therapists.     Pt was not compliant with home exercise program.     Response to previous treatment: has some soreness  Functional change: Easier to get up and go to the bathroom and put her pants and shoes on.     Pain: 0/10   Location: right knee    OBJECTIVE       NP-/64, HR 80, O2 sat 98%    Knee  ROM: (measured in degrees) 9-27-22 prior to treatment   Knee (R)   Flexion 110   Extension -21     Knee  ROM: (measured in degrees) 9-27-22 after treatment  Knee (R)   Flexion 114   Extension -5       Knee  ROM: (measured in degrees) initial  "eval  Knee (R) (L)   Flexion 90 110   Extension -15 -20         Treatment     Shawanda received the treatments listed below:      Shawanda received therapeutic exercises to develop strength, endurance, ROM, flexibility, and core stabilization for 37 minutes including:  Reviewed HEP    Heel slides x 10 3 count hold   Heel slides with strap x 2/10 5 count hold   TKE with rolled towel under knee x 20, hold 5s  SLR 2 x 10  Bridge 2 x 10     HSS long sitting on mat x 10   GSS long sitting on mat x 10    Knee flexion sitting x 2 minimal    LAQ 2 x 10        Not performed this date:  Heel raises 2/10  Hip abd standing 2/10  Hip extension leaning on 4" step with pillow 2 x 10-end of mat with HOB incline today    Knee ext prop with cervical ice pack wrapped around knee x 2 min with back elevated and 2# on top of knee-NP time    Instruction of gait with spc in left hand and sequencing in gym and to car.     manual therapy techniques: Joint mobilizations were applied to the: R patella for 8 minutes, including:  Med/lat and sup/inf glides grade 1-3 STM HS and quad Noted still some scabbing with scar and did not perform STM to scar yet.  Instructed pt again in precautions with healing incision    Pt to ice at home     Patient Education and Home Exercises     Home Exercises Provided and Patient Education Provided     Education provided:   - heel strike with gait to encourage knee extension  - HEP   - stretch to point of tightness not pain   - exercise in pain free zone   -reviewed importance of CP usage for pain and inflammation control, as her therapy will progress and become more intense as she heals.   -explained importance of PT for healing and safety      Written Home Exercises Provided: Instructed patient to continue current home exercise program.    Exercises were reviewed and Shawanda was able to demonstrate them prior to the end of the session.  Shawanda demonstrated good  understanding of the education provided. See EMR " under Patient Instructions for exercises provided during therapy sessions    ASSESSMENT     Walked into clinic with spc not making frequent contact with floor, irregular sequencing, and using spc on right side instead of left. Walking with knee in significant flexion position. Resistant to education of importance of home exercise program compliance and using spc for safety with walking. Initial ROM today was significantly decreased but improved after manual therapy and exercise. Required much encouragement to participate with exercises. Pt later had episode of feeling dizzy, so sat up in chair and checked vitals.-WFL. Limited exercises 2* dizziness episode. Frequent cues for staying on task 2* pt perseveration on limiting her exercise and usage of right LE. Improving tightness in surrounding tissues. Instructed pt to wear pants with looser leg portions to allow access to her knee during sessions.  Time spent on gait with sequencing and using spc in left hand for proper weight shifting and safety. Will benefit from continued physical therapy intervention to progress toward goals set forth in plan of care to improve functional mobility and quality of life.       Shawanda Is progressing well towards her goals.   Pt prognosis is Fair.     Pt will continue to benefit from skilled outpatient physical therapy to address the goals listed in the initial evaluation, provide pt/family education and to maximize pt's level of independence in the home and community environment.     Pt's spiritual, cultural and educational needs considered and pt agreeable to plan of care and goals.     Anticipated barriers to physical therapy: relies on others for transportation, lack of knee extension B    GOALS:   Short Term Goals:  4 weeks  ongoing  Increase range of motion 25%  Increase strength 1/2 muscle grade  Be able to perform HEP with minimal cueing required  Restore ability to ambulate with normal pain free gait with straight cane      Long Term Goals: 8 weeks  ongoing  Increase range of motion to 50-75% full   Improve muscle strength 1 muscle grade  Restore ability to ambulate with normal pain free gait no device  Walking for ADL will be restored without increased pain  Restore ability to stand for ADL without increased pain  Restore ability to drive  Restore ability to perform sit to stand transfer without increased pain  Restore ability to perform ADL's and household activities independently and without increased pain    PLAN   Continue with established plan of care towards PT goals.      Continue focus on ROM  Progress strengthening as tolerated.      Alycia Walsh, PTA

## 2022-10-04 ENCOUNTER — TELEPHONE (OUTPATIENT)
Dept: REHABILITATION | Facility: HOSPITAL | Age: 77
End: 2022-10-04
Payer: MEDICARE

## 2022-10-04 ENCOUNTER — PES CALL (OUTPATIENT)
Dept: ADMINISTRATIVE | Facility: CLINIC | Age: 77
End: 2022-10-04
Payer: MEDICARE

## 2022-10-05 NOTE — PROGRESS NOTES
[facility]   Physical Therapy Progress and Treatment Note     Name: Shawanda Desir  Clinic Number: [id number]    Therapy Diagnosis:   Encounter Diagnoses   Name Primary?    Chronic pain of right knee Yes    Decreased range of motion     Muscle weakness     Difficulty walking        Physician: Matthew Patel MD    Visit Date: 10/6/2022    Physician Orders: PT Eval and Treat      DOS 8-29-22  Medical Diagnosis from Referral: Primary OA R knee  Evaluation Date: 9/16/2022  Authorization Period Expiration: 10-14-22  Plan of Care Expiration: 11-11-22  Progress Note Due: 10-14-22  Visit # / Visits authorized: 5/ 12  FOTO: Issued Visit #: 1, 5     MD Follow up appointment: 10-25-22     Precautions: Diabetes and OA L knee, prior lumbar surgery    PTA Visit #: 3/5     Time In: 11:00  Time Out: 11:45  Total Billable Time: 45 minutes      SUBJECTIVE     Pt reports: knee is feeling better  Pt states a lot of soreness for 2 days after last session.  Pt states she did not ex during that time.  Pt states she is out of tramadol when she has pain and has used oxycodone again      Pt was not compliant with home exercise program due to c/o increased knee pain.       Response to previous treatment:  soreness  Functional change: walking with no device at home in public she brings straight cane as a precaution    Pain: 0/10  at end of session 3/10  Location: right knee    OBJECTIVE     Knee  ROM: (measured in degrees) 10-6-22 prior to treatment   Knee (R)   Flexion N/T   Extension -15     Knee  ROM: (measured in degrees) 10-6-22 after treatment  Knee (R)   Flexion 107   Extension -10       Knee  ROM: (measured in degrees) initial eval  Knee (R) (L)   Flexion 90 110   Extension -15 -20     Functional assessment:   - walking/gait:ambulating with more erect posture, but due to knee flexion contracture decreased knee ext at heel strike with no device limited with erect posture due to previous lumbar surgery at IE  forward bend posturing of trunk ambulates flat foot with decreased knee mobility with gait B   - sit to stand: mod use of hands at IE significant use of hands  - sit to supine: able to transfer I at IE assists with hand to lift R LE to supine        - supine to sit: able to transfer I at IE assists transfer R LE with hand  - supine to prone: N/T      Knee Girth: (measured in centimeters) 10-6-22  Knee RLE   Mid joint 44.0   Suprapatella 45.4   Infrapatella 39.2   Mid calf 30.4      Knee Girth: (measured in centimeters) initial eval  Knee RLE LLE   Mid joint 45.7 41.5   Suprapatella 46.7 44.5   Infrapatella 40.4 38.5   Mid calf 31.3 33.0          Flexibility testing:  - hamstrings:     90/90 test R 30 L 30           - gastrocnemius:   DF ankle R 5 degrees L 5 degrees  - hip flexors severe tightness and unable to achieve hip extension neutral while side lying     Muscle Strength 10-6-22  MMT R L   Hip flexion 4-/5 4/5   Hip abduction 4-/5 4-/5   Hip extension 50% bridge      Knee extension 4+/5 5/5   Knee flexion 4/5 4+/5        Muscle Strength initial eval   MMT R L   Hip flexion 3-/5 4-/5   Hip extension 25% bridge       Knee extension 3-/5 5/5   Knee flexion 4-/5 4/5      Endurance is poor.     Special tests: healed incision with a few pinpoint scab areas from staples no increased redness or warmth at IE incision appears healed with steristrips throughout incision no increased redness or excessive warmth noted and no TTP     Palpation: no TTP     Joint mobility: min inf/superior min-mod med/lateral patella glide at IE min-mod decreased patella glide inferior/superior , mod decreased med/lateral        CMS Impairment/Limitation/Restriction for FOTO knee Survey     Therapist reviewed FOTO scores for Shawanda Desir    FOTO documents entered into [employer] - see Media section.     Limitation Score: 63% at IE 99%        Treatment     Shawanda received the treatments listed below:      Shawanda received therapeutic exercises  "to develop strength, endurance, ROM, flexibility, and core stabilization for 37 minutes including:  Instructed pt to contact MD about refill on pain medication Instructed pt some increased soreness is to be expected but consistent work on HEP will ensure some progress with ROM and instructed pt in stretching to point of tightness and not severe pain    Knee ext prop x 3 min Instructed pt to build up to 5 min at home and perform TID     Heel slides x 10 3 count hold   Heel slides with strap x 2/10 5 count hold   TKE with rolled towel under knee x 20, hold 5s  SLR 2 x 10  Bridge 2 x 10     HSS long sitting on mat x 10   GSS long sitting on mat x 10    (NP)Knee flexion sitting x 2 minimal    (NP)LAQ 2 x 10        Heel raises 2/10  Hip abd standing 2/10  Hip extension leaning on 4" step with pillow 2 x 10    Instructed pt to ice knee as soon as she gets home and to ice as needed if increased soreness occurs.         manual therapy techniques: Joint mobilizations were applied to the: R patella for 8 minutes, including:  Med/lat and sup/inf glides grade 1-3 STM HS and quad Noted still some scabbing with scar and did not perform STM to scar yet.  Instructed pt again in precautions with healing incision    Pt to ice at home     Patient Education and Home Exercises     Home Exercises Provided and Patient Education Provided     Education provided:   - heel strike with gait to encourage knee extension  - HEP   - stretch to point of tightness not pain   - exercise in pain free zone   -reviewed importance of CP usage for pain and inflammation control, as her therapy will progress and become more intense as she heals.   -explained importance of PT for healing and safety      Written Home Exercises Provided: Instructed patient to continue current home exercise program.    Exercises were reviewed and Shawanda was able to demonstrate them prior to the end of the session.  Shawanda demonstrated good  understanding of the education " provided. See EMR under Patient Instructions for exercises provided during therapy sessions    ASSESSMENT   Pt had some increase in pain after today session but appears to understand proper stretching techniques and use of ice and need to get new refill of lower level pain pills if MD approves to avoid oxycodone.  Pt is limited with ROM, but lack of extension may be related to lack of extension prior to surgery and as compare to opposite leg, knee flexion contracture B.  Pt also with difficulty standing erect due to LBP which flexed trunk would further encourage flexion at knees with gait.      Shawanda Is progressing fair towards her goals.   Pt prognosis is Fair.     Pt will continue to benefit from skilled outpatient physical therapy to address the goals listed in the initial evaluation, provide pt/family education and to maximize pt's level of independence in the home and community environment.     Pt's spiritual, cultural and educational needs considered and pt agreeable to plan of care and goals.     Anticipated barriers to physical therapy: relies on others for transportation, lack of knee extension B    GOALS:   Short Term Goals:  4 weeks  MET STG's   Increase range of motion 25%  Increase strength 1/2 muscle grade  Be able to perform HEP with minimal cueing required  Restore ability to ambulate with normal pain free gait with straight cane     Long Term Goals: 8 weeks  ongoing  Increase range of motion to 50-75% full   Improve muscle strength 1 muscle grade  Restore ability to ambulate with normal pain free gait no device  Walking for ADL will be restored without increased pain  Restore ability to stand for ADL without increased pain  Restore ability to drive  Restore ability to perform sit to stand transfer without increased pain  Restore ability to perform ADL's and household activities independently and without increased pain    PLAN   Continue with established plan of care towards PT goals.      Continue  focus on ROM  Progress strengthening as tolerated.      Anastasia Bass, PT

## 2022-10-06 ENCOUNTER — DOCUMENTATION ONLY (OUTPATIENT)
Dept: REHABILITATION | Facility: HOSPITAL | Age: 77
End: 2022-10-06

## 2022-10-06 ENCOUNTER — CLINICAL SUPPORT (OUTPATIENT)
Dept: REHABILITATION | Facility: HOSPITAL | Age: 77
End: 2022-10-06
Payer: MEDICARE

## 2022-10-06 DIAGNOSIS — M62.81 MUSCLE WEAKNESS: ICD-10-CM

## 2022-10-06 DIAGNOSIS — R26.2 DIFFICULTY WALKING: ICD-10-CM

## 2022-10-06 DIAGNOSIS — M25.60 DECREASED RANGE OF MOTION: ICD-10-CM

## 2022-10-06 DIAGNOSIS — M25.561 CHRONIC PAIN OF RIGHT KNEE: Primary | ICD-10-CM

## 2022-10-06 DIAGNOSIS — G89.29 CHRONIC PAIN OF RIGHT KNEE: Primary | ICD-10-CM

## 2022-10-06 DIAGNOSIS — M17.11 PRIMARY OSTEOARTHRITIS OF RIGHT KNEE: Primary | ICD-10-CM

## 2022-10-06 PROCEDURE — 97140 MANUAL THERAPY 1/> REGIONS: CPT | Mod: PN | Performed by: PHYSICAL THERAPIST

## 2022-10-06 PROCEDURE — 97110 THERAPEUTIC EXERCISES: CPT | Mod: PN | Performed by: PHYSICAL THERAPIST

## 2022-10-06 RX ORDER — TRAMADOL HYDROCHLORIDE 50 MG/1
50 TABLET ORAL EVERY 4 HOURS PRN
Qty: 44 TABLET | Refills: 0 | Status: SHIPPED | OUTPATIENT
Start: 2022-10-06 | End: 2022-10-17

## 2022-10-06 RX ORDER — METHOCARBAMOL 750 MG/1
750 TABLET, FILM COATED ORAL 4 TIMES DAILY PRN
Qty: 44 TABLET | Refills: 3 | Status: SHIPPED | OUTPATIENT
Start: 2022-10-06 | End: 2022-12-14 | Stop reason: SDUPTHER

## 2022-10-06 NOTE — TELEPHONE ENCOUNTER
----- Message from Km Pemberton MA sent at 10/6/2022  2:55 PM CDT -----  Contact: patient  Refill Request    Tramadol 50mg & methocarbamoL (ROBAXIN) 750 MG Tab      [id number] - [place] - [address]  [address]  [place] [address]  Phone: [phone] Fax: [phone]    Patient call back number is [phone]

## 2022-10-06 NOTE — PROGRESS NOTES
PT/PTA met face to face to discuss pt's treatment plan and progress towards established goals. Pt will be seen by physical therapy every 6th visit and minimally once per month.

## 2022-10-06 NOTE — PLAN OF CARE
[facility]   Physical Therapy Progress and Treatment Note     Name: Shawanda Desir  Clinic Number: [id number]    Therapy Diagnosis:   Encounter Diagnoses   Name Primary?    Chronic pain of right knee Yes    Decreased range of motion     Muscle weakness     Difficulty walking        Physician: Matthew Patel MD    Visit Date: 10/6/2022    Physician Orders: PT Eval and Treat      DOS 8-29-22  Medical Diagnosis from Referral: Primary OA R knee  Evaluation Date: 9/16/2022  Authorization Period Expiration: 10-14-22  Plan of Care Expiration: 11-11-22  Progress Note Due: 10-14-22  Visit # / Visits authorized: 5/ 12  FOTO: Issued Visit #: 1, 5     MD Follow up appointment: 10-25-22     Precautions: Diabetes and OA L knee, prior lumbar surgery    PTA Visit #: 3/5     Time In: 11:00  Time Out: 11:45  Total Billable Time: 45 minutes      SUBJECTIVE     Pt reports: knee is feeling better  Pt states a lot of soreness for 2 days after last session.  Pt states she did not ex during that time.  Pt states she is out of tramadol when she has pain and has used oxycodone again      Pt was not compliant with home exercise program due to c/o increased knee pain.       Response to previous treatment:  soreness  Functional change: walking with no device at home in public she brings straight cane as a precaution    Pain: 0/10  at end of session 3/10  Location: right knee    OBJECTIVE     Knee  ROM: (measured in degrees) 10-6-22 prior to treatment   Knee (R)   Flexion N/T   Extension -15     Knee  ROM: (measured in degrees) 10-6-22 after treatment  Knee (R)   Flexion 107   Extension -10       Knee  ROM: (measured in degrees) initial eval  Knee (R) (L)   Flexion 90 110   Extension -15 -20     Functional assessment:   - walking/gait:ambulating with more erect posture, but due to knee flexion contracture decreased knee ext at heel strike with no device limited with erect posture due to previous lumbar surgery at IE  forward bend posturing of trunk ambulates flat foot with decreased knee mobility with gait B   - sit to stand: mod use of hands at IE significant use of hands  - sit to supine: able to transfer I at IE assists with hand to lift R LE to supine        - supine to sit: able to transfer I at IE assists transfer R LE with hand  - supine to prone: N/T      Knee Girth: (measured in centimeters) 10-6-22  Knee RLE   Mid joint 44.0   Suprapatella 45.4   Infrapatella 39.2   Mid calf 30.4      Knee Girth: (measured in centimeters) initial eval  Knee RLE LLE   Mid joint 45.7 41.5   Suprapatella 46.7 44.5   Infrapatella 40.4 38.5   Mid calf 31.3 33.0          Flexibility testing:  - hamstrings:     90/90 test R 30 L 30           - gastrocnemius:   DF ankle R 5 degrees L 5 degrees  - hip flexors severe tightness and unable to achieve hip extension neutral while side lying     Muscle Strength 10-6-22  MMT R L   Hip flexion 4-/5 4/5   Hip abduction 4-/5 4-/5   Hip extension 50% bridge      Knee extension 4+/5 5/5   Knee flexion 4/5 4+/5        Muscle Strength initial eval   MMT R L   Hip flexion 3-/5 4-/5   Hip extension 25% bridge       Knee extension 3-/5 5/5   Knee flexion 4-/5 4/5      Endurance is poor.     Special tests: healed incision with a few pinpoint scab areas from staples no increased redness or warmth at IE incision appears healed with steristrips throughout incision no increased redness or excessive warmth noted and no TTP     Palpation: no TTP     Joint mobility: min inf/superior min-mod med/lateral patella glide at IE min-mod decreased patella glide inferior/superior , mod decreased med/lateral        CMS Impairment/Limitation/Restriction for FOTO knee Survey     Therapist reviewed FOTO scores for Shawanda Desir    FOTO documents entered into [employer] - see Media section.     Limitation Score: 63% at IE 99%        Treatment     Shawanda received the treatments listed below:      Shawanda received therapeutic exercises  "to develop strength, endurance, ROM, flexibility, and core stabilization for 37 minutes including:  Instructed pt to contact MD about refill on pain medication Instructed pt some increased soreness is to be expected but consistent work on HEP will ensure some progress with ROM and instructed pt in stretching to point of tightness and not severe pain    Knee ext prop x 3 min Instructed pt to build up to 5 min at home and perform TID     Heel slides x 10 3 count hold   Heel slides with strap x 2/10 5 count hold   TKE with rolled towel under knee x 20, hold 5s  SLR 2 x 10  Bridge 2 x 10     HSS long sitting on mat x 10   GSS long sitting on mat x 10    (NP)Knee flexion sitting x 2 minimal    (NP)LAQ 2 x 10        Heel raises 2/10  Hip abd standing 2/10  Hip extension leaning on 4" step with pillow 2 x 10    Instructed pt to ice knee as soon as she gets home and to ice as needed if increased soreness occurs.         manual therapy techniques: Joint mobilizations were applied to the: R patella for 8 minutes, including:  Med/lat and sup/inf glides grade 1-3 STM HS and quad Noted still some scabbing with scar and did not perform STM to scar yet.  Instructed pt again in precautions with healing incision    Pt to ice at home     Patient Education and Home Exercises     Home Exercises Provided and Patient Education Provided     Education provided:   - heel strike with gait to encourage knee extension  - HEP   - stretch to point of tightness not pain   - exercise in pain free zone   -reviewed importance of CP usage for pain and inflammation control, as her therapy will progress and become more intense as she heals.   -explained importance of PT for healing and safety      Written Home Exercises Provided: Instructed patient to continue current home exercise program.    Exercises were reviewed and Shawanda was able to demonstrate them prior to the end of the session.  Shawanda demonstrated good  understanding of the education " provided. See EMR under Patient Instructions for exercises provided during therapy sessions    ASSESSMENT   Pt had some increase in pain after today session but appears to understand proper stretching techniques and use of ice and need to get new refill of lower level pain pills if MD approves to avoid oxycodone.  Pt is limited with ROM, but lack of extension may be related to lack of extension prior to surgery and as compare to opposite leg, knee flexion contracture B.  Pt also with difficulty standing erect due to LBP which flexed trunk would further encourage flexion at knees with gait.      Shawanda Is progressing fair towards her goals.   Pt prognosis is Fair.     Pt will continue to benefit from skilled outpatient physical therapy to address the goals listed in the initial evaluation, provide pt/family education and to maximize pt's level of independence in the home and community environment.     Pt's spiritual, cultural and educational needs considered and pt agreeable to plan of care and goals.     Anticipated barriers to physical therapy: relies on others for transportation, lack of knee extension B    GOALS:   Short Term Goals:  4 weeks  MET STG's   Increase range of motion 25%  Increase strength 1/2 muscle grade  Be able to perform HEP with minimal cueing required  Restore ability to ambulate with normal pain free gait with straight cane     Long Term Goals: 8 weeks  ongoing  Increase range of motion to 50-75% full   Improve muscle strength 1 muscle grade  Restore ability to ambulate with normal pain free gait no device  Walking for ADL will be restored without increased pain  Restore ability to stand for ADL without increased pain  Restore ability to drive  Restore ability to perform sit to stand transfer without increased pain  Restore ability to perform ADL's and household activities independently and without increased pain    PLAN   Continue with established plan of care towards PT goals.      Continue  focus on ROM  Progress strengthening as tolerated.      Anastasia Bass, PT

## 2022-10-11 ENCOUNTER — CLINICAL SUPPORT (OUTPATIENT)
Dept: REHABILITATION | Facility: HOSPITAL | Age: 77
End: 2022-10-11
Payer: MEDICARE

## 2022-10-11 DIAGNOSIS — M62.81 MUSCLE WEAKNESS: ICD-10-CM

## 2022-10-11 DIAGNOSIS — G89.29 CHRONIC PAIN OF RIGHT KNEE: Primary | ICD-10-CM

## 2022-10-11 DIAGNOSIS — R26.2 DIFFICULTY WALKING: ICD-10-CM

## 2022-10-11 DIAGNOSIS — M25.561 CHRONIC PAIN OF RIGHT KNEE: Primary | ICD-10-CM

## 2022-10-11 DIAGNOSIS — M25.60 DECREASED RANGE OF MOTION: ICD-10-CM

## 2022-10-11 PROCEDURE — 97140 MANUAL THERAPY 1/> REGIONS: CPT | Mod: PN,CQ

## 2022-10-11 PROCEDURE — 97110 THERAPEUTIC EXERCISES: CPT | Mod: PN,CQ

## 2022-10-11 NOTE — PROGRESS NOTES
"[facility] OUTPATIENT THERAPY AND WELLNESS   Physical Therapy Progress and Treatment Note     Name: Shawanda Desir  Clinic Number: [id number]    Therapy Diagnosis:   Encounter Diagnoses   Name Primary?    Chronic pain of right knee Yes    Decreased range of motion     Muscle weakness     Difficulty walking        Physician: Matthew Patel MD    Visit Date: 10/11/2022    Physician Orders: PT Eval and Treat      DOS 8-29-22  Medical Diagnosis from Referral: Primary OA R knee  Evaluation Date: 9/16/2022  Authorization Period Expiration: 10-14-22  Plan of Care Expiration: 11-11-22  Progress Note Due: 10-14-22  Visit # / Visits authorized: 6/ 12  FOTO: Issued Visit #: 1, 5     MD Follow up appointment: 10-25-22     Precautions: Diabetes and OA L knee, prior lumbar surgery    PTA Visit #: 1/5     Time In: 1035  Time Out: 1120  Total Billable Time: 45 minutes      SUBJECTIVE     Pt reports: When I come I don't have any pain but when I go I'm hurting."  States her friend lengthened her spc and she prefers it high. States she was able to lose about 13 pounds with smaller meals and feels better. 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  Able to put on her shoes without assistance.     Pt was compliant with home exercise program due to c/o increased knee pain.       Response to previous treatment:  soreness  Functional change: walking with no device at home in public she brings straight cane as a precaution. Able to put on her shoes without assistance.     Pain: 0/10  at end of session 6/10  Location: right knee    OBJECTIVE     Knee  ROM: (measured in degrees) 10-11-22 prior to treatment   Knee (R)   Flexion N/T   Extension -15     Knee  ROM: (measured in degrees) 10-6-22 after treatment  Knee (R)   Flexion 100   Extension -9       Knee  ROM: (measured in degrees) initial eval  Knee (R) (L)   Flexion 90 110   Extension -15 -20     Functional assessment:   - walking/gait:ambulating with more erect posture, " but due to knee flexion contracture decreased knee ext at heel strike with no device limited with erect posture due to previous lumbar surgery at IE forward bend posturing of trunk ambulates flat foot with decreased knee mobility with gait B   - sit to stand: mod use of hands at IE significant use of hands  - sit to supine: able to transfer I at IE assists with hand to lift R LE to supine        - supine to sit: able to transfer I at IE assists transfer R LE with hand  - supine to prone: N/T      Knee Girth: (measured in centimeters) 10-6-22  Knee RLE   Mid joint 44.0   Suprapatella 45.4   Infrapatella 39.2   Mid calf 30.4      Knee Girth: (measured in centimeters) initial eval  Knee RLE LLE   Mid joint 45.7 41.5   Suprapatella 46.7 44.5   Infrapatella 40.4 38.5   Mid calf 31.3 33.0          Flexibility testing:  - hamstrings:     90/90 test R 30 L 30           - gastrocnemius:   DF ankle R 5 degrees L 5 degrees  - hip flexors severe tightness and unable to achieve hip extension neutral while side lying     Muscle Strength 10-6-22  MMT R L   Hip flexion 4-/5 4/5   Hip abduction 4-/5 4-/5   Hip extension 50% bridge      Knee extension 4+/5 5/5   Knee flexion 4/5 4+/5        Muscle Strength initial eval   MMT R L   Hip flexion 3-/5 4-/5   Hip extension 25% bridge       Knee extension 3-/5 5/5   Knee flexion 4-/5 4/5      Endurance is poor.     Special tests: healed incision with a few pinpoint scab areas from staples no increased redness or warmth at IE incision appears healed with steristrips throughout incision no increased redness or excessive warmth noted and no TTP     Palpation: no TTP     Joint mobility: min inf/superior min-mod med/lateral patella glide at IE min-mod decreased patella glide inferior/superior , mod decreased med/lateral        CMS Impairment/Limitation/Restriction for FOTO knee Survey     Therapist reviewed FOTO scores for Shawanda Desir    [employer] documents entered into [employer] - see Media  "section.     Limitation Score: 63% at IE 99%        Treatment     Shawanda received the treatments listed below:      Shawanda received therapeutic exercises to develop strength, endurance, ROM, flexibility, and core stabilization for 37 minutes including:  Instructed pt to contact MD about refill on pain medication Instructed pt some increased soreness is to be expected but consistent work on HEP will ensure some progress with ROM and instructed pt in stretching to point of tightness and not severe pain    Knee ext prop x 3 min Instructed pt to build up to 5 min at home and perform TID     Heel slides x 15 3 count hold   Heel slides with strap x 2/10 5 count hold   TKE with rolled towel under knee x 20, hold 5s  SLR 2 x 10  Bridge 2 x 10     HSS long sitting on mat x 10   GSS long sitting on mat x 10    (NP)Knee flexion sitting x 2 min    (NP)LAQ 2 x 10        Heel raises 2/10  Hip abd standing 2/10  Hip extension leaning on 4" step with pillow 2 x 10-used 6" step today 2* 4" step unavailable    Instructed pt to ice knee as soon as she gets home and to ice as needed if increased soreness occurs.         manual therapy techniques: Joint mobilizations were applied to the: R patella for 8 minutes, including:  Med/lat and sup/inf glides grade 1-3 STM HS and quad Noted still some scabbing with scar and did not perform STM to scar yet.  Instructed pt again in precautions with healing incision    Pt to ice at home     Patient Education and Home Exercises     Home Exercises Provided and Patient Education Provided     Education provided:   - heel strike with gait to encourage knee extension  - HEP   - stretch to point of tightness not pain   - exercise in pain free zone   -reviewed importance of CP usage for pain and inflammation control, as her therapy will progress and become more intense as she heals.   -explained importance of PT for healing and safety      Written Home Exercises Provided: Instructed patient to continue " current home exercise program.    Exercises were reviewed and Shawanda was able to demonstrate them prior to the end of the session.  Shawanda demonstrated good  understanding of the education provided. See EMR under Patient Instructions for exercises provided during therapy sessions    ASSESSMENT     No discomfort at arrival. Walking with elevated level spc with minimal right knee flexed position in stance. Flexed posture at trunk with gait. Manual cues for increased VMO activation with QS and cues for sustaining QS throughout SLR. Cues for derotation of LE to prevent ER with HS and subsequent strain on sides of knee. Decreased ROM right knee today with pt resisting ROM, stating she is going to the bank after session and doesn't want to feel any discomfort.  Will benefit from continued physical therapy intervention to progress toward goals set forth in plan of care to improve functional mobility and quality of life.       Shawanda Is progressing fair towards her goals.   Pt prognosis is Fair.     Pt will continue to benefit from skilled outpatient physical therapy to address the goals listed in the initial evaluation, provide pt/family education and to maximize pt's level of independence in the home and community environment.     Pt's spiritual, cultural and educational needs considered and pt agreeable to plan of care and goals.     Anticipated barriers to physical therapy: relies on others for transportation, lack of knee extension B    GOALS:   Short Term Goals:  4 weeks  MET STG's   Increase range of motion 25%  Increase strength 1/2 muscle grade  Be able to perform HEP with minimal cueing required  Restore ability to ambulate with normal pain free gait with straight cane     Long Term Goals: 8 weeks  ongoing  Increase range of motion to 50-75% full   Improve muscle strength 1 muscle grade  Restore ability to ambulate with normal pain free gait no device  Walking for ADL will be restored without increased  pain  Restore ability to stand for ADL without increased pain  Restore ability to drive  Restore ability to perform sit to stand transfer without increased pain  Restore ability to perform ADL's and household activities independently and without increased pain    PLAN   Continue with established plan of care towards PT goals.      Continue focus on ROM  Progress strengthening as tolerated.      Alycia Walsh, PTA

## 2022-10-17 ENCOUNTER — PATIENT OUTREACH (OUTPATIENT)
Dept: ADMINISTRATIVE | Facility: HOSPITAL | Age: 77
End: 2022-10-17
Payer: MEDICARE

## 2022-10-17 ENCOUNTER — OFFICE VISIT (OUTPATIENT)
Dept: HOME HEALTH SERVICES | Facility: CLINIC | Age: 77
End: 2022-10-17
Payer: MEDICARE

## 2022-10-17 VITALS
SYSTOLIC BLOOD PRESSURE: 157 MMHG | DIASTOLIC BLOOD PRESSURE: 78 MMHG | HEIGHT: 62 IN | WEIGHT: 179 LBS | BODY MASS INDEX: 32.94 KG/M2 | HEART RATE: 96 BPM

## 2022-10-17 DIAGNOSIS — E11.29 TYPE 2 DIABETES MELLITUS WITH MICROALBUMINURIA, WITHOUT LONG-TERM CURRENT USE OF INSULIN: ICD-10-CM

## 2022-10-17 DIAGNOSIS — M25.561 CHRONIC PAIN OF BOTH KNEES: ICD-10-CM

## 2022-10-17 DIAGNOSIS — I77.1 STRICTURE OF ARTERY: ICD-10-CM

## 2022-10-17 DIAGNOSIS — E78.49 OTHER HYPERLIPIDEMIA: ICD-10-CM

## 2022-10-17 DIAGNOSIS — E66.09 CLASS 1 OBESITY DUE TO EXCESS CALORIES WITH SERIOUS COMORBIDITY AND BODY MASS INDEX (BMI) OF 32.0 TO 32.9 IN ADULT: ICD-10-CM

## 2022-10-17 DIAGNOSIS — I10 ESSENTIAL HYPERTENSION: ICD-10-CM

## 2022-10-17 DIAGNOSIS — H35.033 HYPERTENSIVE RETINOPATHY, BILATERAL: ICD-10-CM

## 2022-10-17 DIAGNOSIS — G89.29 CHRONIC PAIN OF BOTH KNEES: ICD-10-CM

## 2022-10-17 DIAGNOSIS — M25.562 CHRONIC PAIN OF BOTH KNEES: ICD-10-CM

## 2022-10-17 DIAGNOSIS — Z74.09 OTHER REDUCED MOBILITY: ICD-10-CM

## 2022-10-17 DIAGNOSIS — Z00.00 ENCOUNTER FOR PREVENTIVE HEALTH EXAMINATION: Primary | ICD-10-CM

## 2022-10-17 DIAGNOSIS — I50.30 DIASTOLIC CONGESTIVE HEART FAILURE, UNSPECIFIED HF CHRONICITY: ICD-10-CM

## 2022-10-17 DIAGNOSIS — R80.9 TYPE 2 DIABETES MELLITUS WITH MICROALBUMINURIA, WITHOUT LONG-TERM CURRENT USE OF INSULIN: ICD-10-CM

## 2022-10-17 PROCEDURE — 3078F PR MOST RECENT DIASTOLIC BLOOD PRESSURE < 80 MM HG: ICD-10-PCS | Mod: CPTII,S$GLB,, | Performed by: NURSE PRACTITIONER

## 2022-10-17 PROCEDURE — 1170F PR FUNCTIONAL STATUS ASSESSED: ICD-10-PCS | Mod: CPTII,S$GLB,, | Performed by: NURSE PRACTITIONER

## 2022-10-17 PROCEDURE — 3077F PR MOST RECENT SYSTOLIC BLOOD PRESSURE >= 140 MM HG: ICD-10-PCS | Mod: CPTII,S$GLB,, | Performed by: NURSE PRACTITIONER

## 2022-10-17 PROCEDURE — 1160F PR REVIEW ALL MEDS BY PRESCRIBER/CLIN PHARMACIST DOCUMENTED: ICD-10-PCS | Mod: CPTII,S$GLB,, | Performed by: NURSE PRACTITIONER

## 2022-10-17 PROCEDURE — G0439 PPPS, SUBSEQ VISIT: HCPCS | Mod: S$GLB,,, | Performed by: NURSE PRACTITIONER

## 2022-10-17 PROCEDURE — 1159F PR MEDICATION LIST DOCUMENTED IN MEDICAL RECORD: ICD-10-PCS | Mod: CPTII,S$GLB,, | Performed by: NURSE PRACTITIONER

## 2022-10-17 PROCEDURE — 1170F FXNL STATUS ASSESSED: CPT | Mod: CPTII,S$GLB,, | Performed by: NURSE PRACTITIONER

## 2022-10-17 PROCEDURE — 99499 UNLISTED E&M SERVICE: CPT | Mod: S$GLB,,, | Performed by: NURSE PRACTITIONER

## 2022-10-17 PROCEDURE — 1160F RVW MEDS BY RX/DR IN RCRD: CPT | Mod: CPTII,S$GLB,, | Performed by: NURSE PRACTITIONER

## 2022-10-17 PROCEDURE — 1101F PR PT FALLS ASSESS DOC 0-1 FALLS W/OUT INJ PAST YR: ICD-10-PCS | Mod: CPTII,S$GLB,, | Performed by: NURSE PRACTITIONER

## 2022-10-17 PROCEDURE — 3078F DIAST BP <80 MM HG: CPT | Mod: CPTII,S$GLB,, | Performed by: NURSE PRACTITIONER

## 2022-10-17 PROCEDURE — 3077F SYST BP >= 140 MM HG: CPT | Mod: CPTII,S$GLB,, | Performed by: NURSE PRACTITIONER

## 2022-10-17 PROCEDURE — 1101F PT FALLS ASSESS-DOCD LE1/YR: CPT | Mod: CPTII,S$GLB,, | Performed by: NURSE PRACTITIONER

## 2022-10-17 PROCEDURE — G0439 PR MEDICARE ANNUAL WELLNESS SUBSEQUENT VISIT: ICD-10-PCS | Mod: S$GLB,,, | Performed by: NURSE PRACTITIONER

## 2022-10-17 PROCEDURE — 3288F PR FALLS RISK ASSESSMENT DOCUMENTED: ICD-10-PCS | Mod: CPTII,S$GLB,, | Performed by: NURSE PRACTITIONER

## 2022-10-17 PROCEDURE — 3288F FALL RISK ASSESSMENT DOCD: CPT | Mod: CPTII,S$GLB,, | Performed by: NURSE PRACTITIONER

## 2022-10-17 PROCEDURE — 1159F MED LIST DOCD IN RCRD: CPT | Mod: CPTII,S$GLB,, | Performed by: NURSE PRACTITIONER

## 2022-10-17 RX ORDER — ASPIRIN 81 MG/1
81 TABLET ORAL DAILY
Status: ON HOLD | COMMUNITY
End: 2023-03-13 | Stop reason: HOSPADM

## 2022-10-17 NOTE — PATIENT INSTRUCTIONS
Counseling and Referral of Other Preventative  (Italic type indicates deductible and co-insurance are waived)    Patient Name: Shawanda Desir  Today's Date: 10/17/2022    Health Maintenance       Date Due Completion Date    TETANUS VACCINE Never done ---    Shingles Vaccine (1 of 2) Never done ---    Pneumococcal Vaccines (Age 65+) (2 - PPSV23 if available, else PCV20) 11/08/2019 11/8/2018    Eye Exam 07/18/2020 7/18/2019    Diabetes Urine Screening 08/25/2021 8/25/2020    DEXA Scan 11/09/2021 11/9/2018    COVID-19 Vaccine (4 - Booster for Pfizer series) 01/12/2022 11/17/2021    Influenza Vaccine (1) 09/01/2022 11/3/2021    Lipid Panel 09/07/2022 9/7/2021    Hemoglobin A1c 12/08/2022 6/8/2022    Aspirin/Antiplatelet Therapy 10/17/2023 10/17/2022        Orders Placed This Encounter   Procedures    MICROALBUMIN / CREATININE RATIO URINE     The following information is provided to all patients.  This information is to help you find resources for any of the problems found today that may be affecting your health:                Living healthy guide: www.[facility].[place].gov      Understanding Diabetes: www.diabetes.org      Eating healthy: www.cdc.gov/healthyweight      CDC home safety checklist: www.cdc.gov/steadi/patient.html      Agency on Aging: www.goea.[place].[facility]      Alcoholics anonymous (AA): www.aa.org      Physical Activity: www.carrie.nih.gov/bb7ccog      Tobacco use: www.quitwithusla.org

## 2022-10-17 NOTE — PROGRESS NOTES
"  Shawanda Desir presented for a  Medicare AWV and comprehensive Health Risk Assessment today. The following components were reviewed and updated:    Medical history  Family History  Social history  Allergies and Current Medications  Health Risk Assessment  Health Maintenance  Care Team         ** See Completed Assessments for Annual Wellness Visit within the encounter summary.**         The following assessments were completed:  Living Situation  CAGE  Depression Screening  Timed Get Up and Go  Whisper Test  Cognitive Function Screening  Nutrition Screening  ADL Screening  PAQ Screening        Vitals:    10/17/22 1229   BP: (!) 157/78   Pulse: 96   Weight: 81.2 kg (179 lb)   Height: 5' 2" (1.575 m)     Body mass index is 32.74 kg/m².  Physical Exam  Constitutional:       Appearance: Normal appearance.   HENT:      Head: Normocephalic and atraumatic.      Nose: Nose normal.   Eyes:      Extraocular Movements: Extraocular movements intact.      Pupils: Pupils are equal, round, and reactive to light.   Cardiovascular:      Rate and Rhythm: Normal rate.      Pulses: Normal pulses.   Pulmonary:      Effort: Pulmonary effort is normal.   Musculoskeletal:      Cervical back: Normal range of motion and neck supple.      Right knee: Decreased range of motion. Tenderness present.      Comments: Post surgery 8/2022   Neurological:      General: No focal deficit present.      Mental Status: She is alert and oriented to person, place, and time.           Diagnoses and health risks identified today and associated recommendations/orders:    1. Encounter for preventive health examination  Awv completed      2. Type 2 diabetes mellitus with microalbuminuria, without long-term current use of insulin  Chronic and stable. Continue current treatment. Follow with PCP.  - MICROALBUMIN / CREATININE RATIO URINE; Future    3. Other reduced mobility  Chronic and stable. Continue current treatment. Follow with PCP.      4. Hypertensive " retinopathy, bilateral  Chronic and stable. Continue current treatment. Follow with PCP.      5. Stricture of artery  Chronic and stable. Continue current treatment. Follow with PCP.      6. Other hyperlipidemia  Chronic and stable. Continue current treatment. Follow with PCP.  On statin     7. Essential hypertension  Chronic and stable. Continue current treatment. Follow with PCP.  On meds       8. Diastolic congestive heart failure, unspecified HF chronicity  Chronic and stable. Continue current treatment. Follow with PCP.        9. Class 1 obesity due to excess calories with serious comorbidity and body mass index (BMI) of 32.0 to 32.9 in adult  Chronic and stable. Continue current treatment. Follow with PCP.      10. Chronic pain of both knees  Chronic and stable. Continue current treatment. Follow with PCP.        Provided Shawanda with a 5-10 year written screening schedule and personal prevention plan. Recommendations were developed using the USPSTF age appropriate recommendations. Education, counseling, and referrals were provided as needed. After Visit Summary printed and given to patient which includes a list of additional screenings\tests needed.    Fu in 1 yr for AWV          Lauryn Jimenez NP  I offered to discuss advanced care planning, including how to pick a person who would make decisions for you if you were unable to make them for yourself, called a health care power of , and what kind of decisions you might make such as use of life sustaining treatments such as ventilators and tube feeding when faced with a life limiting illness recorded on a living will that they will need to know. (How you want to be cared for as you near the end of your natural life)     X Patient is interested in learning more about how to make advanced directives.  I provided them paperwork and offered to discuss this with them.

## 2022-10-18 ENCOUNTER — CLINICAL SUPPORT (OUTPATIENT)
Dept: REHABILITATION | Facility: HOSPITAL | Age: 77
End: 2022-10-18
Payer: MEDICARE

## 2022-10-18 DIAGNOSIS — M25.60 DECREASED RANGE OF MOTION: ICD-10-CM

## 2022-10-18 DIAGNOSIS — G89.29 CHRONIC PAIN OF RIGHT KNEE: Primary | ICD-10-CM

## 2022-10-18 DIAGNOSIS — M62.81 MUSCLE WEAKNESS: ICD-10-CM

## 2022-10-18 DIAGNOSIS — R26.2 DIFFICULTY WALKING: ICD-10-CM

## 2022-10-18 DIAGNOSIS — M25.561 CHRONIC PAIN OF RIGHT KNEE: Primary | ICD-10-CM

## 2022-10-18 PROCEDURE — 97110 THERAPEUTIC EXERCISES: CPT | Mod: PN | Performed by: PHYSICAL THERAPIST

## 2022-10-18 NOTE — PROGRESS NOTES
[facility]   Physical Therapy Treatment Note     Name: Shawanda Desir  Clinic Number: [id number]    Therapy Diagnosis:   Encounter Diagnoses   Name Primary?    Chronic pain of right knee Yes    Decreased range of motion     Muscle weakness     Difficulty walking          Physician: Matthew Patel MD    Visit Date: 10/18/2022    Physician Orders: PT Eval and Treat      DOS 8-29-22  Medical Diagnosis from Referral: Primary OA R knee  Evaluation Date: 9/16/2022  Authorization Period Expiration: 10-14-22  Plan of Care Expiration: 11-11-22  Progress Note Due: 10-20-22 prior to MD   Visit # / Visits authorized: 8/ 12  FOTO: Issued Visit #: 1, 5     MD Follow up appointment: 10-25-22     Precautions: Diabetes and OA L knee, prior lumbar surgery    PTA Visit #: 1/5     Time In: 11:00  Time Out: 11:43  Total Billable Time: 43 minutes      SUBJECTIVE     Pt reports: she continues to get pain after PT when we work on trying to get more extension in her knee Pt states she is now walking with no cane.  Pt states she is able to do what she needs to do at home.  Pt states she is very happy with her knee where it is now and when we try to get more range she feels increased soreness that requires her to take pain pills and go to bed for 2 days.     Pt was compliant with home exercise program     Response to previous treatment:  soreness  Functional change: walking with no device at home and in  public  Able to put on her shoes without assistance.     Pain: 0/10  at end of session 0/10  Location: right knee    OBJECTIVE     Knee  ROM: (measured in degrees) 10-18-22 prior to treatment   Knee (R)   Flexion 100   Extension -15     Knee  ROM: (measured in degrees) 10-18-22 after treatment  Knee (R)   Flexion 103   Extension -10       Knee  ROM: (measured in degrees) initial eval  Knee (R) (L)   Flexion 90 110   Extension -15 -20          Treatment     Shawanda received the treatments listed below:      Shawanda  "received therapeutic exercises to develop strength, endurance, ROM, flexibility, and core stabilization for 43 minutes including:  Instructed pt to contact MD about refill on pain medication Instructed pt some increased soreness is to be expected but consistent work on HEP will ensure some progress with ROM and instructed pt in stretching to point of tightness and not severe pain    Knee ext prop x 1:30 sec and had to stop due to knee discomfort.  Upon questioning pt reported she did not time herself with knee ext prop at home and is only doing 2 times a day due to being busy at home  Heel slides x 20 3 count hold   (NP)due to pt not performing at home Heel slides with strap x 2/10 5 count hold   TKE with rolled towel under knee x 20, hold 5s  SLR 2 x 10  Bridge 2 x 10     HSS long sitting on mat x 10   GSS long sitting on mat x 10    (NP)Knee flexion sitting x 2 min    (NP)LAQ 2 x 10        Heel raises 10  Hip abd standing 10  Hip extension leaning on 4" step with pillow 2 x 10-used 6" step today 2* 4" step unavailable    Instructed pt to ice knee as soon as she gets home and to ice as needed if increased soreness occurs.         (NP)manual therapy techniques: Joint mobilizations were applied to the: R patella for 0 minutes, including:  Med/lat and sup/inf glides grade 1-3 STM HS and quad Noted still some scabbing with scar and did not perform STM to scar yet.  Instructed pt again in precautions with healing incision    Pt to ice at home     Patient Education and Home Exercises     Home Exercises Provided and Patient Education Provided     Education provided:   - heel strike with gait to encourage knee extension  - HEP   - stretch to point of tightness not pain   - exercise in pain free zone   -reviewed importance of CP usage for pain and inflammation control, as her therapy will progress and become more intense as she heals.   -explained importance of PT for healing and safety      Written Home Exercises Provided: " Instructed patient to continue current home exercise program.    Exercises were reviewed and Shawanda was able to demonstrate them prior to the end of the session.  Shawanda demonstrated good  understanding of the education provided. See EMR under Patient Instructions for exercises provided during therapy sessions    ASSESSMENT   Pt continues to report discomfort after PT so had pt quide through program similar to what she does at home. Held heel slides with strap and held patella mobs and scar mobilization if this could be form of irritation to pt. Pt was only able to hold knee ext prop for 1:30 minutes.   Pt able to achieve -10 degrees extension and 103 flexion and appears to be pleased with this level of mobility for pt is able to ambulate without AD and without pain despite lack of full knee extension.      Shawanda Is progressing fair towards her goals.   Pt prognosis is Fair.     Pt will continue to benefit from skilled outpatient physical therapy to address the goals listed in the initial evaluation, provide pt/family education and to maximize pt's level of independence in the home and community environment.     Pt's spiritual, cultural and educational needs considered and pt agreeable to plan of care and goals.     Anticipated barriers to physical therapy: relies on others for transportation, lack of knee extension B    GOALS:   Short Term Goals:  4 weeks  MET STG's   Increase range of motion 25%  Increase strength 1/2 muscle grade  Be able to perform HEP with minimal cueing required  Restore ability to ambulate with normal pain free gait with straight cane     Long Term Goals: 8 weeks  ongoing  Increase range of motion to 50-75% full   Improve muscle strength 1 muscle grade  Restore ability to ambulate with normal pain free gait no device  Walking for ADL will be restored without increased pain  Restore ability to stand for ADL without increased pain  Restore ability to drive  Restore ability to perform sit to  stand transfer without increased pain  Restore ability to perform ADL's and household activities independently and without increased pain    PLAN   Continue with established plan of care towards PT goals.      Reassess next visit and consider recommendation for discharge as pt feels comfortable at this level of function and mobility    Anastasia Bass, PT

## 2022-10-19 DIAGNOSIS — M17.11 PRIMARY OSTEOARTHRITIS OF RIGHT KNEE: Primary | ICD-10-CM

## 2022-10-24 ENCOUNTER — CLINICAL SUPPORT (OUTPATIENT)
Dept: REHABILITATION | Facility: HOSPITAL | Age: 77
End: 2022-10-24
Payer: MEDICARE

## 2022-10-24 DIAGNOSIS — G89.29 CHRONIC PAIN OF RIGHT KNEE: Primary | ICD-10-CM

## 2022-10-24 DIAGNOSIS — M62.81 MUSCLE WEAKNESS: ICD-10-CM

## 2022-10-24 DIAGNOSIS — M25.60 DECREASED RANGE OF MOTION: ICD-10-CM

## 2022-10-24 DIAGNOSIS — R26.2 DIFFICULTY WALKING: ICD-10-CM

## 2022-10-24 DIAGNOSIS — M25.561 CHRONIC PAIN OF RIGHT KNEE: Primary | ICD-10-CM

## 2022-10-24 PROCEDURE — 97110 THERAPEUTIC EXERCISES: CPT | Mod: PN | Performed by: PHYSICAL THERAPIST

## 2022-10-24 NOTE — PLAN OF CARE
[facility]   Physical Therapy Progress and Treatment Note     Name: Shawanda Desir  Clinic Number: [id number]    Therapy Diagnosis:   Encounter Diagnoses   Name Primary?    Chronic pain of right knee Yes    Decreased range of motion     Muscle weakness     Difficulty walking          Physician: Matthew Patel MD    Visit Date: 10/24/2022    Physician Orders: PT Eval and Treat      DOS 8-29-22  Medical Diagnosis from Referral: Primary OA R knee  Evaluation Date: 9/16/2022  Authorization Period Expiration: 10-14-22  Plan of Care Expiration: 11-11-22  Progress Note Due: 10-20-22 prior to MD   Visit # / Visits authorized: 9/ 12  FOTO: Issued Visit #: 1, 5     MD Follow up appointment: 10-25-22     Precautions: Diabetes and OA L knee, prior lumbar surgery    PTA Visit #: 1/5     Time In: 8:30  Time Out: 9:10  Total Billable Time: 40 minutes      SUBJECTIVE     Pt reports: feeling very good with no pain and able to to do all activities without difficulty or pain.  Pt states no longer taking any medication.  .     Pt was compliant with home exercise program     Response to previous treatment:  felt ok   Functional change: walking with no device at home and in  public  Able to put on her shoes without assistance.     Pain: 0/10  at end of session 0/10  Location: right knee    OBJECTIVE     Knee  ROM: (measured in degrees) 10-24-22 prior to treatment   Knee (R)   Flexion 103   Extension -13     Knee  ROM: (measured in degrees) 10-24-22 after treatment  Knee (R)   Flexion 105   Extension -10       Knee  ROM: (measured in degrees) initial eval  Knee (R) (L)   Flexion 90 110   Extension -15 -20      Functional assessment:   - walking/gait:ambulating with more erect posture, but due to knee flexion contracture decreased knee ext at heel strike with no device limited with erect posture due to previous lumbar surgery at IE forward bend posturing of trunk ambulates flat foot with decreased knee mobility  with gait B   - sit to stand: min use of hands at IE significant use of hands  - sit to supine: able to transfer I at IE assists with hand to lift R LE to supine        - supine to sit: able to transfer I at IE assists transfer R LE with hand  - supine to prone: N/T      Knee Girth: (measured in centimeters) 10-6-22, unable to assess on 10-24-22 due to pt clothing but no increase in swelling noted   Knee RLE   Mid joint 44.0   Suprapatella 45.4   Infrapatella 39.2   Mid calf 30.4      Knee Girth: (measured in centimeters) initial eval  Knee RLE LLE   Mid joint 45.7 41.5   Suprapatella 46.7 44.5   Infrapatella 40.4 38.5   Mid calf 31.3 33.0          Flexibility testing:  - hamstrings:     90/90 test R 20 L 25 at IE R 30 L 30           - gastrocnemius:   DF ankle R 5 degrees L 5 degrees  - hip flexors  continues with severe hip flexion tightness at IE severe tightness and unable to achieve hip extension neutral while side lying     Muscle Strength 10-24-22  MMT R L   Hip flexion 4-/5 4/5   Hip abduction 4/5 4-/5   Hip extension able to bridge        Knee extension 5/5 5/5   Knee flexion 5/5 5/5         Muscle Strength initial eval   MMT R L   Hip flexion 3-/5 4-/5   Hip extension 25% bridge       Knee extension 3-/5 5/5   Knee flexion 4-/5 4/5      Endurance is  good at IE poor.       Joint mobility: did not assess on 10-24 due to pt c/o pain after treatment with pressure on knee from manual therapy on 10-6 min inf/superior min-mod med/lateral patella glide at IE min-mod decreased patella glide inferior/superior , mod decreased med/lateral        CMS Impairment/Limitation/Restriction for FOTO knee Survey     Therapist reviewed FOTO scores for Shawanda Desir    KAREN documents entered into [employer] - see Media section.     Limitation Score: 37% at IE 99%       Treatment     Shawanda received the treatments listed below:      Shawanda received therapeutic exercises to develop strength, endurance, ROM, flexibility, and core  "stabilization for 40 minutes including:  Instructed pt to contact MD about refill on pain medication Instructed pt some increased soreness is to be expected but consistent work on HEP will ensure some progress with ROM and instructed pt in stretching to point of tightness and not severe pain  Quad set x 2/10    Knee ext prop x 3  min Performed FOTO during stretching and with distraction able to tolerate sustained stretch.  Heel slides x 20 3 count hold   (NP)due to pt not performing at home Heel slides with strap x 2/10 5 count hold   TKE with roll under knee x 20, hold 5s  SLR 2 x 10  Bridge 2 x 10     HSS long sitting on mat x 10   GSS long sitting on mat x 10    (NP)Knee flexion sitting x 2 min    (NP)LAQ 2 x 10        Heel raises 10  Hip abd standing 10  Hip extension leaning on 4" step with pillow 2 x 10-used 6" step today 2* 4" step unavailable    Instructed pt to ice knee as soon as she gets home and to ice as needed if increased soreness occurs.         (NP)manual therapy techniques: Joint mobilizations were applied to the: R patella for 0 minutes, including:  Med/lat and sup/inf glides grade 1-3 STM HS and quad Noted still some scabbing with scar and did not perform STM to scar yet.  Instructed pt again in precautions with healing incision    Pt to ice at home     Patient Education and Home Exercises     Home Exercises Provided and Patient Education Provided     Education provided:   - heel strike with gait to encourage knee extension  - HEP   - stretch to point of tightness not pain   - exercise in pain free zone   -reviewed importance of CP usage for pain and inflammation control, as her therapy will progress and become more intense as she heals.   -explained importance of PT for healing and safety      Written Home Exercises Provided: Instructed patient to continue current home exercise program.    Exercises were reviewed and Shawanda was able to demonstrate them prior to the end of the session.  Shawanda " demonstrated good  understanding of the education provided. See EMR under Patient Instructions for exercises provided during therapy sessions    ASSESSMENT   Patient demonstrates improvement in range of motion, strength, stabilization and function.  Pt had c/o increased pain after performing manual therapy to improve joint mobility    Pt able to achieve -10 degrees extension and 105 flexion and appears to be pleased with this level of mobility for pt is able to ambulate without AD and without pain despite lack of full knee extension.    Patient appears independent in the prescribed HEP and ready for discharge after fully achieving the established goals.          GOALS:   Short Term Goals:  4 weeks  MET STG's   Increase range of motion 25%  Increase strength 1/2 muscle grade  Be able to perform HEP with minimal cueing required  Restore ability to ambulate with normal pain free gait with straight cane     Long Term Goals: 8 weeks  MET LTG's  Increase range of motion to 50-75% full   Improve muscle strength 1 muscle grade  Restore ability to ambulate with normal pain free gait no device  Walking for ADL will be restored without increased pain  Restore ability to stand for ADL without increased pain  Restore ability to drive  Restore ability to perform sit to stand transfer without increased pain  Restore ability to perform ADL's and household activities independently and without increased pain    PLAN   If you concur, I recommend patient be discharged from physical therapy.   Please advise us of your findings and recommendations. Thank you for allowing us to assist in the care of your patient.        Anastasia Bass, PT     I certify the need for these services furnished under this plan of treatment and while under my care.    ____________________________________ Physician/Referring Practitioner                                  Date of Signature

## 2022-10-24 NOTE — PROGRESS NOTES
[facility]   Physical Therapy Progress and Treatment Note     Name: Shawanda Desir  Clinic Number: [id number]    Therapy Diagnosis:   Encounter Diagnoses   Name Primary?    Chronic pain of right knee Yes    Decreased range of motion     Muscle weakness     Difficulty walking          Physician: Matthew Patel MD    Visit Date: 10/24/2022    Physician Orders: PT Eval and Treat      DOS 8-29-22  Medical Diagnosis from Referral: Primary OA R knee  Evaluation Date: 9/16/2022  Authorization Period Expiration: 10-14-22  Plan of Care Expiration: 11-11-22  Progress Note Due: 10-20-22 prior to MD   Visit # / Visits authorized: 9/ 12  FOTO: Issued Visit #: 1, 5     MD Follow up appointment: 10-25-22     Precautions: Diabetes and OA L knee, prior lumbar surgery    PTA Visit #: 1/5     Time In: 8:30  Time Out: 9:10  Total Billable Time: 40 minutes      SUBJECTIVE     Pt reports: feeling very good with no pain and able to to do all activities without difficulty or pain.  Pt states no longer taking any medication.  .     Pt was compliant with home exercise program     Response to previous treatment:  felt ok   Functional change: walking with no device at home and in  public  Able to put on her shoes without assistance.     Pain: 0/10  at end of session 0/10  Location: right knee    OBJECTIVE     Knee  ROM: (measured in degrees) 10-24-22 prior to treatment   Knee (R)   Flexion 103   Extension -13     Knee  ROM: (measured in degrees) 10-24-22 after treatment  Knee (R)   Flexion 105   Extension -10       Knee  ROM: (measured in degrees) initial eval  Knee (R) (L)   Flexion 90 110   Extension -15 -20      Functional assessment:   - walking/gait:ambulating with more erect posture, but due to knee flexion contracture decreased knee ext at heel strike with no device limited with erect posture due to previous lumbar surgery at IE forward bend posturing of trunk ambulates flat foot with decreased knee mobility  with gait B   - sit to stand: min use of hands at IE significant use of hands  - sit to supine: able to transfer I at IE assists with hand to lift R LE to supine        - supine to sit: able to transfer I at IE assists transfer R LE with hand  - supine to prone: N/T      Knee Girth: (measured in centimeters) 10-6-22, unable to assess on 10-24-22 due to pt clothing but no increase in swelling noted   Knee RLE   Mid joint 44.0   Suprapatella 45.4   Infrapatella 39.2   Mid calf 30.4      Knee Girth: (measured in centimeters) initial eval  Knee RLE LLE   Mid joint 45.7 41.5   Suprapatella 46.7 44.5   Infrapatella 40.4 38.5   Mid calf 31.3 33.0          Flexibility testing:  - hamstrings:     90/90 test R 20 L 25 at IE R 30 L 30           - gastrocnemius:   DF ankle R 5 degrees L 5 degrees  - hip flexors  continues with severe hip flexion tightness at IE severe tightness and unable to achieve hip extension neutral while side lying     Muscle Strength 10-24-22  MMT R L   Hip flexion 4-/5 4/5   Hip abduction 4/5 4-/5   Hip extension able to bridge        Knee extension 5/5 5/5   Knee flexion 5/5 5/5         Muscle Strength initial eval   MMT R L   Hip flexion 3-/5 4-/5   Hip extension 25% bridge       Knee extension 3-/5 5/5   Knee flexion 4-/5 4/5      Endurance is  good at IE poor.       Joint mobility: did not assess on 10-24 due to pt c/o pain after treatment with pressure on knee from manual therapy on 10-6 min inf/superior min-mod med/lateral patella glide at IE min-mod decreased patella glide inferior/superior , mod decreased med/lateral        CMS Impairment/Limitation/Restriction for FOTO knee Survey     Therapist reviewed FOTO scores for Shawanda Desir    KARNE documents entered into [employer] - see Media section.     Limitation Score: 37% at IE 99%       Treatment     Shawanda received the treatments listed below:      Shawanda received therapeutic exercises to develop strength, endurance, ROM, flexibility, and core  "stabilization for 40 minutes including:  Instructed pt to contact MD about refill on pain medication Instructed pt some increased soreness is to be expected but consistent work on HEP will ensure some progress with ROM and instructed pt in stretching to point of tightness and not severe pain  Quad set x 2/10    Knee ext prop x 3  min Performed FOTO during stretching and with distraction able to tolerate sustained stretch.  Heel slides x 20 3 count hold   (NP)due to pt not performing at home Heel slides with strap x 2/10 5 count hold   TKE with roll under knee x 20, hold 5s  SLR 2 x 10  Bridge 2 x 10     HSS long sitting on mat x 10   GSS long sitting on mat x 10    (NP)Knee flexion sitting x 2 min    (NP)LAQ 2 x 10        Heel raises 10  Hip abd standing 10  Hip extension leaning on 4" step with pillow 2 x 10-used 6" step today 2* 4" step unavailable    Instructed pt to ice knee as soon as she gets home and to ice as needed if increased soreness occurs.         (NP)manual therapy techniques: Joint mobilizations were applied to the: R patella for 0 minutes, including:  Med/lat and sup/inf glides grade 1-3 STM HS and quad Noted still some scabbing with scar and did not perform STM to scar yet.  Instructed pt again in precautions with healing incision    Pt to ice at home     Patient Education and Home Exercises     Home Exercises Provided and Patient Education Provided     Education provided:   - heel strike with gait to encourage knee extension  - HEP   - stretch to point of tightness not pain   - exercise in pain free zone   -reviewed importance of CP usage for pain and inflammation control, as her therapy will progress and become more intense as she heals.   -explained importance of PT for healing and safety      Written Home Exercises Provided: Instructed patient to continue current home exercise program.    Exercises were reviewed and Shawanda was able to demonstrate them prior to the end of the session.  Shawanda " demonstrated good  understanding of the education provided. See EMR under Patient Instructions for exercises provided during therapy sessions    ASSESSMENT   Patient demonstrates improvement in range of motion, strength, stabilization and function.  Pt had c/o increased pain after performing manual therapy to improve joint mobility    Pt able to achieve -10 degrees extension and 105 flexion and appears to be pleased with this level of mobility for pt is able to ambulate without AD and without pain despite lack of full knee extension.    Patient appears independent in the prescribed HEP and ready for discharge after fully achieving the established goals.          GOALS:   Short Term Goals:  4 weeks  MET STG's   Increase range of motion 25%  Increase strength 1/2 muscle grade  Be able to perform HEP with minimal cueing required  Restore ability to ambulate with normal pain free gait with straight cane     Long Term Goals: 8 weeks  MET LTG's  Increase range of motion to 50-75% full   Improve muscle strength 1 muscle grade  Restore ability to ambulate with normal pain free gait no device  Walking for ADL will be restored without increased pain  Restore ability to stand for ADL without increased pain  Restore ability to drive  Restore ability to perform sit to stand transfer without increased pain  Restore ability to perform ADL's and household activities independently and without increased pain    PLAN   If you concur, I recommend patient be discharged from physical therapy.   Please advise us of your findings and recommendations. Thank you for allowing us to assist in the care of your patient.        Anastasia Bass, PT     I certify the need for these services furnished under this plan of treatment and while under my care.    ____________________________________ Physician/Referring Practitioner                                  Date of Signature

## 2022-10-25 ENCOUNTER — HOSPITAL ENCOUNTER (OUTPATIENT)
Dept: RADIOLOGY | Facility: HOSPITAL | Age: 77
Discharge: HOME | End: 2022-10-25
Attending: NURSE PRACTITIONER
Payer: MEDICARE

## 2022-10-25 ENCOUNTER — OFFICE VISIT (OUTPATIENT)
Dept: ORTHOPEDICS | Facility: CLINIC | Age: 77
End: 2022-10-25
Payer: MEDICARE

## 2022-10-25 DIAGNOSIS — Z96.651 S/P TOTAL KNEE ARTHROPLASTY, RIGHT: Primary | ICD-10-CM

## 2022-10-25 DIAGNOSIS — M17.11 PRIMARY OSTEOARTHRITIS OF RIGHT KNEE: ICD-10-CM

## 2022-10-25 PROCEDURE — 1126F PR PAIN SEVERITY QUANTIFIED, NO PAIN PRESENT: ICD-10-PCS | Mod: CPTII,S$GLB,, | Performed by: NURSE PRACTITIONER

## 2022-10-25 PROCEDURE — 1159F MED LIST DOCD IN RCRD: CPT | Mod: CPTII,S$GLB,, | Performed by: NURSE PRACTITIONER

## 2022-10-25 PROCEDURE — 99024 POSTOP FOLLOW-UP VISIT: CPT | Mod: S$GLB,,, | Performed by: NURSE PRACTITIONER

## 2022-10-25 PROCEDURE — 73560 XR KNEE ORTHO RIGHT: ICD-10-PCS | Mod: 26,LT,, | Performed by: RADIOLOGY

## 2022-10-25 PROCEDURE — 3288F PR FALLS RISK ASSESSMENT DOCUMENTED: ICD-10-PCS | Mod: CPTII,S$GLB,, | Performed by: NURSE PRACTITIONER

## 2022-10-25 PROCEDURE — 73562 X-RAY EXAM OF KNEE 3: CPT | Mod: 26,RT,, | Performed by: RADIOLOGY

## 2022-10-25 PROCEDURE — 1160F PR REVIEW ALL MEDS BY PRESCRIBER/CLIN PHARMACIST DOCUMENTED: ICD-10-PCS | Mod: CPTII,S$GLB,, | Performed by: NURSE PRACTITIONER

## 2022-10-25 PROCEDURE — 73560 X-RAY EXAM OF KNEE 1 OR 2: CPT | Mod: 26,LT,, | Performed by: RADIOLOGY

## 2022-10-25 PROCEDURE — 3288F FALL RISK ASSESSMENT DOCD: CPT | Mod: CPTII,S$GLB,, | Performed by: NURSE PRACTITIONER

## 2022-10-25 PROCEDURE — 1126F AMNT PAIN NOTED NONE PRSNT: CPT | Mod: CPTII,S$GLB,, | Performed by: NURSE PRACTITIONER

## 2022-10-25 PROCEDURE — 1101F PR PT FALLS ASSESS DOC 0-1 FALLS W/OUT INJ PAST YR: ICD-10-PCS | Mod: CPTII,S$GLB,, | Performed by: NURSE PRACTITIONER

## 2022-10-25 PROCEDURE — 73562 XR KNEE ORTHO RIGHT: ICD-10-PCS | Mod: 26,RT,, | Performed by: RADIOLOGY

## 2022-10-25 PROCEDURE — 99999 PR PBB SHADOW E&M-EST. PATIENT-LVL III: ICD-10-PCS | Mod: PBBFAC,,, | Performed by: NURSE PRACTITIONER

## 2022-10-25 PROCEDURE — 1159F PR MEDICATION LIST DOCUMENTED IN MEDICAL RECORD: ICD-10-PCS | Mod: CPTII,S$GLB,, | Performed by: NURSE PRACTITIONER

## 2022-10-25 PROCEDURE — 1160F RVW MEDS BY RX/DR IN RCRD: CPT | Mod: CPTII,S$GLB,, | Performed by: NURSE PRACTITIONER

## 2022-10-25 PROCEDURE — 99024 PR POST-OP FOLLOW-UP VISIT: ICD-10-PCS | Mod: S$GLB,,, | Performed by: NURSE PRACTITIONER

## 2022-10-25 PROCEDURE — 73560 X-RAY EXAM OF KNEE 1 OR 2: CPT | Mod: TC,59,PO,LT

## 2022-10-25 PROCEDURE — 1101F PT FALLS ASSESS-DOCD LE1/YR: CPT | Mod: CPTII,S$GLB,, | Performed by: NURSE PRACTITIONER

## 2022-10-25 PROCEDURE — 99999 PR PBB SHADOW E&M-EST. PATIENT-LVL III: CPT | Mod: PBBFAC,,, | Performed by: NURSE PRACTITIONER

## 2022-10-25 NOTE — PROGRESS NOTES
Chief Complaint   Patient presents with    Right Knee - Post-op Evaluation       HPI:  77 y.o. female returns to clinic today status post  right TKA 8 weeks ago. Pain is nonexisting. Patient is compliant most of the time with restrictions.     R knee  ROM 2-110    X-rays were performed today, personally reviewed by me and findings discussed with the patient.  3 views of the right knee show implants intact and in good position without any evidence of loosening.           S/P total knee arthroplasty, right  Continue exercises at home.     She is doing well.   She may return to clinic in 4 months or as needed.

## 2022-10-28 ENCOUNTER — PATIENT MESSAGE (OUTPATIENT)
Dept: FAMILY MEDICINE | Facility: CLINIC | Age: 77
End: 2022-10-28
Payer: MEDICARE

## 2022-12-07 ENCOUNTER — OFFICE VISIT (OUTPATIENT)
Dept: FAMILY MEDICINE | Facility: CLINIC | Age: 77
End: 2022-12-07
Payer: MEDICARE

## 2022-12-07 ENCOUNTER — TELEPHONE (OUTPATIENT)
Dept: ORTHOPEDICS | Facility: CLINIC | Age: 77
End: 2022-12-07
Payer: MEDICARE

## 2022-12-07 VITALS
SYSTOLIC BLOOD PRESSURE: 122 MMHG | HEART RATE: 85 BPM | DIASTOLIC BLOOD PRESSURE: 70 MMHG | WEIGHT: 187.38 LBS | OXYGEN SATURATION: 99 % | BODY MASS INDEX: 34.27 KG/M2

## 2022-12-07 DIAGNOSIS — E78.49 OTHER HYPERLIPIDEMIA: ICD-10-CM

## 2022-12-07 DIAGNOSIS — E11.29 TYPE 2 DIABETES MELLITUS WITH MICROALBUMINURIA, WITHOUT LONG-TERM CURRENT USE OF INSULIN: Primary | ICD-10-CM

## 2022-12-07 DIAGNOSIS — I10 ESSENTIAL HYPERTENSION: ICD-10-CM

## 2022-12-07 DIAGNOSIS — R80.9 TYPE 2 DIABETES MELLITUS WITH MICROALBUMINURIA, WITHOUT LONG-TERM CURRENT USE OF INSULIN: Primary | ICD-10-CM

## 2022-12-07 PROCEDURE — 99999 PR PBB SHADOW E&M-EST. PATIENT-LVL III: ICD-10-PCS | Mod: PBBFAC,,, | Performed by: INTERNAL MEDICINE

## 2022-12-07 PROCEDURE — 1159F MED LIST DOCD IN RCRD: CPT | Mod: CPTII,S$GLB,, | Performed by: INTERNAL MEDICINE

## 2022-12-07 PROCEDURE — 1126F PR PAIN SEVERITY QUANTIFIED, NO PAIN PRESENT: ICD-10-PCS | Mod: CPTII,S$GLB,, | Performed by: INTERNAL MEDICINE

## 2022-12-07 PROCEDURE — 99214 OFFICE O/P EST MOD 30 MIN: CPT | Mod: S$GLB,,, | Performed by: INTERNAL MEDICINE

## 2022-12-07 PROCEDURE — 99999 PR PBB SHADOW E&M-EST. PATIENT-LVL III: CPT | Mod: PBBFAC,,, | Performed by: INTERNAL MEDICINE

## 2022-12-07 PROCEDURE — 3074F PR MOST RECENT SYSTOLIC BLOOD PRESSURE < 130 MM HG: ICD-10-PCS | Mod: CPTII,S$GLB,, | Performed by: INTERNAL MEDICINE

## 2022-12-07 PROCEDURE — 3074F SYST BP LT 130 MM HG: CPT | Mod: CPTII,S$GLB,, | Performed by: INTERNAL MEDICINE

## 2022-12-07 PROCEDURE — 3078F DIAST BP <80 MM HG: CPT | Mod: CPTII,S$GLB,, | Performed by: INTERNAL MEDICINE

## 2022-12-07 PROCEDURE — 3288F FALL RISK ASSESSMENT DOCD: CPT | Mod: CPTII,S$GLB,, | Performed by: INTERNAL MEDICINE

## 2022-12-07 PROCEDURE — 1126F AMNT PAIN NOTED NONE PRSNT: CPT | Mod: CPTII,S$GLB,, | Performed by: INTERNAL MEDICINE

## 2022-12-07 PROCEDURE — 1101F PR PT FALLS ASSESS DOC 0-1 FALLS W/OUT INJ PAST YR: ICD-10-PCS | Mod: CPTII,S$GLB,, | Performed by: INTERNAL MEDICINE

## 2022-12-07 PROCEDURE — 1159F PR MEDICATION LIST DOCUMENTED IN MEDICAL RECORD: ICD-10-PCS | Mod: CPTII,S$GLB,, | Performed by: INTERNAL MEDICINE

## 2022-12-07 PROCEDURE — 99214 PR OFFICE/OUTPT VISIT, EST, LEVL IV, 30-39 MIN: ICD-10-PCS | Mod: S$GLB,,, | Performed by: INTERNAL MEDICINE

## 2022-12-07 PROCEDURE — 3078F PR MOST RECENT DIASTOLIC BLOOD PRESSURE < 80 MM HG: ICD-10-PCS | Mod: CPTII,S$GLB,, | Performed by: INTERNAL MEDICINE

## 2022-12-07 PROCEDURE — 3288F PR FALLS RISK ASSESSMENT DOCUMENTED: ICD-10-PCS | Mod: CPTII,S$GLB,, | Performed by: INTERNAL MEDICINE

## 2022-12-07 PROCEDURE — 1101F PT FALLS ASSESS-DOCD LE1/YR: CPT | Mod: CPTII,S$GLB,, | Performed by: INTERNAL MEDICINE

## 2022-12-07 NOTE — TELEPHONE ENCOUNTER
----- Message from Fanny Perez sent at 12/7/2022 11:47 AM CST -----  Regarding: pt call back request  Name of Who is Calling:RADHA CHOUDHURY [[id number]]          What is the request in detail: pt has concerns about discharging           Can the clinic reply by [facility]: no           What Number to Call Back if not in [facility]:[phone] (home)

## 2022-12-07 NOTE — PROGRESS NOTES
Subjective:       Patient ID: Shawanda Desir is a 77 y.o. female.    Chief Complaint: Diabetes and Follow-up    Pt here for routine check up    Htn- stable on medication  Dm- stable on glipizide and metformin. Alast aa1c 6.5 . Due eye exam  Hyperlipidemai- on lipitor     Diabetes  Pertinent negatives for hypoglycemia include no headaches. Pertinent negatives for diabetes include no chest pain.   Follow-up  Pertinent negatives include no chest pain, fever or headaches. Review of Systems   Constitutional:  Negative for fever.   Respiratory:  Negative for shortness of breath.    Cardiovascular:  Negative for chest pain.   Neurological:  Negative for headaches.       Objective:      Physical Exam  Constitutional:       Appearance: Normal appearance.   HENT:      Head: Normocephalic.   Cardiovascular:      Rate and Rhythm: Normal rate and regular rhythm.   Pulmonary:      Effort: Pulmonary effort is normal.      Breath sounds: Normal breath sounds.   Musculoskeletal:         General: Normal range of motion.      Cervical back: Normal range of motion.   Neurological:      General: No focal deficit present.      Mental Status: She is alert.   Psychiatric:         Mood and Affect: Mood normal.         Behavior: Behavior normal.       Assessment:       Problem List Items Addressed This Visit          Cardiac/Vascular    Other hyperlipidemia    Essential hypertension       Endocrine    Type 2 diabetes mellitus with microalbuminuria, without long-term current use of insulin - Primary    Relevant Orders    Lipid Panel    Comprehensive Metabolic Panel    Hemoglobin A1C       Plan:       Dm- pt making eye exam appt.  Levels good.  A1c in 3 months  Htn- stable on medication  Hyperlipidemia- check labs

## 2022-12-14 DIAGNOSIS — M17.11 PRIMARY OSTEOARTHRITIS OF RIGHT KNEE: ICD-10-CM

## 2022-12-14 RX ORDER — METHOCARBAMOL 750 MG/1
750 TABLET, FILM COATED ORAL 4 TIMES DAILY PRN
Qty: 44 TABLET | Refills: 3 | Status: ON HOLD | OUTPATIENT
Start: 2022-12-14 | End: 2023-03-13 | Stop reason: HOSPADM

## 2022-12-14 NOTE — TELEPHONE ENCOUNTER
----- Message from Gayle Hinojosa sent at 12/14/2022 11:12 AM CST -----  Regarding: pt called  Can the clinic reply in [facility]: NO          Please refill the medication(s) listed below. Please call the patient when the prescription(s) is ready for  at this phone number   RADHA CHOUDHURY [[id number]] pt needs this medication refilled      [phone] (home)       Medication #1triamcinolone acetonide 0.1% (KENALOG) 0.1 % Lotn        Medication #2          Preferred Pharmacy:   [facility]/pharmacy #[id number] - [place] - [address]  [address]  [place] [address]  Phone: [phone] Fax: [phone]

## 2022-12-14 NOTE — TELEPHONE ENCOUNTER
----- Message from Gayle Hinojosa sent at 12/14/2022 11:16 AM CST -----  Regarding: pt called  Can the clinic reply in [facility]: NO          Please refill the medication(s) listed below. Please call the patient when the prescription(s) is ready for  at this phone number   [phone] (home)  pt pharmacy says the refil script was never put in. She wants to know if she can get a 90 day supply of her medication. please advise             Medication #1 methocarbamoL (ROBAXIN) 750 MG Tab        Medication #2          Preferred Pharmacy:   [facility]/pharmacy #[id number] - [place] - [address]  [address]  [place] [address]  Phone: [phone] Fax: [phone]

## 2022-12-14 NOTE — TELEPHONE ENCOUNTER
No new care gaps identified.  [facility] Embedded Care Gaps. Reference number: [id number]. 12/14/2022   1:41:27 PM CST

## 2022-12-19 RX ORDER — TRIAMCINOLONE ACETONIDE 1 MG/ML
LOTION TOPICAL 2 TIMES DAILY PRN
Qty: 60 ML | Refills: 5 | Status: SHIPPED | OUTPATIENT
Start: 2022-12-19 | End: 2023-05-24 | Stop reason: SDUPTHER

## 2023-01-03 ENCOUNTER — TELEPHONE (OUTPATIENT)
Dept: FAMILY MEDICINE | Facility: CLINIC | Age: 78
End: 2023-01-03
Payer: MEDICARE

## 2023-01-03 NOTE — TELEPHONE ENCOUNTER
Pt states she has a cold for a week. Denies fever. Requesting medication be sent. Offered pt appt, but no openings until Friday.  Pt did not want to wait that long.

## 2023-01-03 NOTE — TELEPHONE ENCOUNTER
----- Message from Sun Ozuna sent at 1/3/2023  2:05 PM CST -----  Contact: patient  Type: Needs Medical Advice  Who Called:  patient  Symptoms (please be specific):  cough, runny nose  How long has patient had these symptoms:  week  Pharmacy name and phone #:    [facility]pharmacy #[id number] - [place], [place] - [address]  [address]  [place] [address]  Phone: [phone] Fax: [phone]  Best Call Back Number: [phone] (home)   Additional Information: patient would like something called in for her.

## 2023-01-04 RX ORDER — AZITHROMYCIN 250 MG/1
TABLET, FILM COATED ORAL
Qty: 6 TABLET | Refills: 0 | Status: SHIPPED | OUTPATIENT
Start: 2023-01-04 | End: 2023-01-09

## 2023-01-09 ENCOUNTER — TELEPHONE (OUTPATIENT)
Dept: ORTHOPEDICS | Facility: CLINIC | Age: 78
End: 2023-01-09
Payer: MEDICARE

## 2023-01-09 NOTE — TELEPHONE ENCOUNTER
Spoke with pt and pt advise that she cannot walk a long distance , using a walker and has 2 large knots on her knee.   Advise that her appt is scheduled for 1/12/23 @ 11:00

## 2023-01-09 NOTE — TELEPHONE ENCOUNTER
----- Message from Ricardo Borges sent at 1/9/2023  8:58 AM CST -----  Regarding: Has 2 big knots on her L knee, call pt   Contact: pt   Has 2 big knots on her L knee, call pt

## 2023-01-12 ENCOUNTER — OFFICE VISIT (OUTPATIENT)
Dept: ORTHOPEDICS | Facility: CLINIC | Age: 78
End: 2023-01-12
Payer: MEDICARE

## 2023-01-12 VITALS — HEIGHT: 62 IN | BODY MASS INDEX: 34.41 KG/M2 | WEIGHT: 187 LBS

## 2023-01-12 DIAGNOSIS — M17.12 PRIMARY OSTEOARTHRITIS OF LEFT KNEE: Primary | ICD-10-CM

## 2023-01-12 PROCEDURE — 1125F PR PAIN SEVERITY QUANTIFIED, PAIN PRESENT: ICD-10-PCS | Mod: CPTII,S$GLB,, | Performed by: ORTHOPAEDIC SURGERY

## 2023-01-12 PROCEDURE — 1101F PT FALLS ASSESS-DOCD LE1/YR: CPT | Mod: CPTII,S$GLB,, | Performed by: ORTHOPAEDIC SURGERY

## 2023-01-12 PROCEDURE — 1160F RVW MEDS BY RX/DR IN RCRD: CPT | Mod: CPTII,S$GLB,, | Performed by: ORTHOPAEDIC SURGERY

## 2023-01-12 PROCEDURE — 99213 OFFICE O/P EST LOW 20 MIN: CPT | Mod: 25,S$GLB,, | Performed by: ORTHOPAEDIC SURGERY

## 2023-01-12 PROCEDURE — 3288F FALL RISK ASSESSMENT DOCD: CPT | Mod: CPTII,S$GLB,, | Performed by: ORTHOPAEDIC SURGERY

## 2023-01-12 PROCEDURE — 1159F MED LIST DOCD IN RCRD: CPT | Mod: CPTII,S$GLB,, | Performed by: ORTHOPAEDIC SURGERY

## 2023-01-12 PROCEDURE — 99213 PR OFFICE/OUTPT VISIT, EST, LEVL III, 20-29 MIN: ICD-10-PCS | Mod: 25,S$GLB,, | Performed by: ORTHOPAEDIC SURGERY

## 2023-01-12 PROCEDURE — 20610 LARGE JOINT ASPIRATION/INJECTION: L KNEE: ICD-10-PCS | Mod: LT,S$GLB,, | Performed by: ORTHOPAEDIC SURGERY

## 2023-01-12 PROCEDURE — 20610 DRAIN/INJ JOINT/BURSA W/O US: CPT | Mod: LT,S$GLB,, | Performed by: ORTHOPAEDIC SURGERY

## 2023-01-12 PROCEDURE — 99999 PR PBB SHADOW E&M-EST. PATIENT-LVL III: ICD-10-PCS | Mod: PBBFAC,,, | Performed by: ORTHOPAEDIC SURGERY

## 2023-01-12 PROCEDURE — 99999 PR PBB SHADOW E&M-EST. PATIENT-LVL III: CPT | Mod: PBBFAC,,, | Performed by: ORTHOPAEDIC SURGERY

## 2023-01-12 PROCEDURE — 1125F AMNT PAIN NOTED PAIN PRSNT: CPT | Mod: CPTII,S$GLB,, | Performed by: ORTHOPAEDIC SURGERY

## 2023-01-12 PROCEDURE — 1101F PR PT FALLS ASSESS DOC 0-1 FALLS W/OUT INJ PAST YR: ICD-10-PCS | Mod: CPTII,S$GLB,, | Performed by: ORTHOPAEDIC SURGERY

## 2023-01-12 PROCEDURE — 1160F PR REVIEW ALL MEDS BY PRESCRIBER/CLIN PHARMACIST DOCUMENTED: ICD-10-PCS | Mod: CPTII,S$GLB,, | Performed by: ORTHOPAEDIC SURGERY

## 2023-01-12 PROCEDURE — 3288F PR FALLS RISK ASSESSMENT DOCUMENTED: ICD-10-PCS | Mod: CPTII,S$GLB,, | Performed by: ORTHOPAEDIC SURGERY

## 2023-01-12 PROCEDURE — 1159F PR MEDICATION LIST DOCUMENTED IN MEDICAL RECORD: ICD-10-PCS | Mod: CPTII,S$GLB,, | Performed by: ORTHOPAEDIC SURGERY

## 2023-01-12 RX ADMIN — TRIAMCINOLONE ACETONIDE 40 MG: 40 INJECTION, SUSPENSION INTRA-ARTICULAR; INTRAMUSCULAR at 11:01

## 2023-01-17 ENCOUNTER — TELEPHONE (OUTPATIENT)
Dept: ORTHOPEDICS | Facility: CLINIC | Age: 78
End: 2023-01-17
Payer: MEDICARE

## 2023-01-17 NOTE — TELEPHONE ENCOUNTER
----- Message from Km Pemberton MA sent at 1/17/2023  9:55 AM CST -----  Contact: patient  Patient stated the injection she received last week did not help & may need surgery on left knee.    Call back number is [phone]

## 2023-01-19 RX ORDER — TRIAMCINOLONE ACETONIDE 40 MG/ML
40 INJECTION, SUSPENSION INTRA-ARTICULAR; INTRAMUSCULAR
Status: DISCONTINUED | OUTPATIENT
Start: 2023-01-12 | End: 2023-01-19 | Stop reason: HOSPADM

## 2023-01-19 NOTE — PROCEDURES
Large Joint Aspiration/Injection: L knee    Date/Time: 1/12/2023 11:00 AM  Performed by: Matthew Patel MD  Authorized by: Matthew Patel MD     Consent Done?:  Yes (Verbal)  Indications:  Pain  Timeout: prior to procedure the correct patient, procedure, and site was verified    Prep: patient was prepped and draped in usual sterile fashion    Local anesthetic:  Lidocaine 1% without epinephrine  Anesthetic total (ml):  5      Details:  Needle Size:  21 G  Approach:  Anterolateral  Location:  Knee  Site:  L knee  Medications:  40 mg triamcinolone acetonide 40 mg/mL  Patient tolerance:  Patient tolerated the procedure well with no immediate complications

## 2023-01-19 NOTE — PROGRESS NOTES
Chief Complaint   Patient presents with    Left Knee - Pain, Tingling     2 LARGE KNOTS     Right Knee - Post-op Evaluation, Pain         HPI:   This is a 77 y.o. who presents to clinic today complaining of left knee pain for 1 months after no known trauma. Pain is progressively worsening. No numbness or tingling. No associated signs or symptoms.    Past Medical History:   Diagnosis Date    Anticoagulant long-term use     Arthritis     Diabetes mellitus     Hypertension      Past Surgical History:   Procedure Laterality Date    BACK SURGERY      BREAST BIOPSY Right     neg--core    ROBOTIC ARTHROPLASTY, KNEE Right 8/29/2022    Procedure: ROBOTIC ARTHROPLASTY, KNEE, TOTAL ATTEMPTED,CONVERTED TO OPEN;  Surgeon: Matthew Patel MD;  Location: [facility];  Service: Orthopedics;  Laterality: Right;    TRACHEOSTOMY      age 14     Current Outpatient Medications on File Prior to Visit   Medication Sig Dispense Refill    allopurinoL (ZYLOPRIM) 100 MG tablet TAKE 1 TABLET BY MOUTH EVERY DAY 90 tablet 0    amLODIPine (NORVASC) 10 MG tablet TAKE 1 TABLET BY MOUTH EVERY DAY 90 tablet 3    aspirin (ECOTRIN) 81 MG EC tablet Take 81 mg by mouth once daily.      atorvastatin (LIPITOR) 10 MG tablet TAKE 1 TABLET BY MOUTH EVERY DAY IN THE EVENING 90 tablet 3    metFORMIN (GLUCOPHAGE-XR) 500 MG ER 24hr tablet Take 1 tablet (500 mg total) by mouth 2 (two) times daily with meals. 180 tablet 3    methocarbamoL (ROBAXIN) 750 MG Tab Take 1 tablet (750 mg total) by mouth 4 (four) times daily as needed. 44 tablet 3    olmesartan (BENICAR) 40 MG tablet TAKE 1 TABLET BY MOUTH EVERY DAY 90 tablet 3    oxyCODONE (ROXICODONE) 5 MG immediate release tablet Take 1 tablet (5 mg total) by mouth every 4 (four) hours as needed for Pain. 22 tablet 0    oxyCODONE-acetaminophen (PERCOCET)  mg per tablet Take 1 tablet by mouth every 6 (six) hours as needed. 44 tablet 0    triamcinolone acetonide 0.1% (KENALOG) 0.1 % Lotn Apply topically 2 (two) times  daily as needed. 60 mL 5    acetaminophen (TYLENOL) 500 MG tablet Take 2 tablets (1,000 mg total) by mouth every 8 (eight) hours. 90 tablet 0    glipiZIDE (GLUCOTROL) 5 MG TR24 Take 1 tablet (5 mg total) by mouth daily with breakfast. 90 tablet 3     No current facility-administered medications on file prior to visit.     Review of patient's allergies indicates:   Allergen Reactions    Clonidine      Causes chest tightness and leg swelling    Tradjenta [linagliptin]      Chest tightness and leg swelling     Family History   Problem Relation Age of Onset    Cancer Mother     Cancer Father      Social History     Socioeconomic History    Marital status:    Tobacco Use    Smoking status: Never    Smokeless tobacco: Never   Substance and Sexual Activity    Alcohol use: No    Drug use: No     Social Determinants of Health     Financial Resource Strain: Low Risk     Difficulty of Paying Living Expenses: Not hard at all   Food Insecurity: No Food Insecurity    Worried About Running Out of Food in the Last Year: Never true    Ran Out of Food in the Last Year: Never true   Transportation Needs: No Transportation Needs    Lack of Transportation (Medical): No    Lack of Transportation (Non-Medical): No   Physical Activity: Insufficiently Active    Days of Exercise per Week: 3 days    Minutes of Exercise per Session: 40 min   Stress: No Stress Concern Present    Feeling of Stress : Not at all   Social Connections: Moderately Integrated    Frequency of Communication with Friends and Family: More than three times a week    Frequency of Social Gatherings with Friends and Family: Once a week    Attends Sikh Services: More than 4 times per year    Active Member of Clubs or Organizations: Yes    Attends Club or Organization Meetings: More than 4 times per year    Marital Status:    Housing Stability: Low Risk     Unable to Pay for Housing in the Last Year: No    Number of Places Lived in the Last Year: 1     Unstable Housing in the Last Year: No       Review of Systems:  Constitutional:  Denies fever or chills   Eyes:  Denies change in visual acuity   HENT:  Denies nasal congestion or sore throat   Respiratory:  Denies cough or shortness of breath   Cardiovascular:  Denies chest pain or edema   GI:  Denies abdominal pain, nausea, vomiting, bloody stools or diarrhea   :  Denies dysuria   Integument:  Denies rash   Neurologic:  Denies headache, focal weakness or sensory changes   Endocrine:  Denies polyuria or polydipsia   Lymphatic:  Denies swollen glands   Psychiatric:  Denies depression or anxiety     Physical Exam:   Constitutional:  Well developed, well nourished, no acute distress, non-toxic appearance   Integument:  Well hydrated, no rash   Lymphatic:  No lymphadenopathy noted   Neurologic:  Alert & oriented x 3, CN 2-12 normal, normal motor function, normal sensory function, no focal deficits noted   Psychiatric:  Speech and behavior appropriate   Eyes: EOMI  Gi: abdomen soft    Bilateral Knee Exam    left Knee Exam     Tenderness   The patient is experiencing tenderness in the medial joint line.    Range of Motion   Extension: abnormal   Flexion: abnormal     Muscle Strength     The patient has normal knee strength.    Tests   Aide:  Medial - positive   Lachman:  Anterior - negative      Varus: negative  Valgus: negative  Patellar Apprehension: negative    Other   Erythema: absent  Sensation: normal  Pulse: present  Swelling: mild      right Knee Exam   right knee exam performed same as contralateral side and is normal.            X-rays were performed, personally reviewed by me and findings discussed with the patient.  3 views of the left knee show tricompartmental degenerative change most pronounced in the medial compartment with Kellgren 3 changes    There are no diagnoses linked to this encounter.        Using an aseptic technique, I injected 5 cc of lidocaine 1% without and 1 cc of kenalog 40mg into  the left Knee. The patient tolerated this well. I will have them return to clinic as needed.

## 2023-01-24 ENCOUNTER — OFFICE VISIT (OUTPATIENT)
Dept: ORTHOPEDICS | Facility: CLINIC | Age: 78
End: 2023-01-24
Payer: MEDICARE

## 2023-01-24 VITALS — WEIGHT: 187 LBS | BODY MASS INDEX: 34.41 KG/M2 | HEIGHT: 62 IN

## 2023-01-24 DIAGNOSIS — Z01.818 PRE-OP TESTING: ICD-10-CM

## 2023-01-24 DIAGNOSIS — M17.12 PRIMARY OSTEOARTHRITIS OF LEFT KNEE: Primary | ICD-10-CM

## 2023-01-24 PROCEDURE — 99999 PR PBB SHADOW E&M-EST. PATIENT-LVL IV: ICD-10-PCS | Mod: PBBFAC,,, | Performed by: ORTHOPAEDIC SURGERY

## 2023-01-24 PROCEDURE — 1101F PR PT FALLS ASSESS DOC 0-1 FALLS W/OUT INJ PAST YR: ICD-10-PCS | Mod: CPTII,S$GLB,, | Performed by: ORTHOPAEDIC SURGERY

## 2023-01-24 PROCEDURE — 3288F FALL RISK ASSESSMENT DOCD: CPT | Mod: CPTII,S$GLB,, | Performed by: ORTHOPAEDIC SURGERY

## 2023-01-24 PROCEDURE — 1159F MED LIST DOCD IN RCRD: CPT | Mod: CPTII,S$GLB,, | Performed by: ORTHOPAEDIC SURGERY

## 2023-01-24 PROCEDURE — 1101F PT FALLS ASSESS-DOCD LE1/YR: CPT | Mod: CPTII,S$GLB,, | Performed by: ORTHOPAEDIC SURGERY

## 2023-01-24 PROCEDURE — 3288F PR FALLS RISK ASSESSMENT DOCUMENTED: ICD-10-PCS | Mod: CPTII,S$GLB,, | Performed by: ORTHOPAEDIC SURGERY

## 2023-01-24 PROCEDURE — 1159F PR MEDICATION LIST DOCUMENTED IN MEDICAL RECORD: ICD-10-PCS | Mod: CPTII,S$GLB,, | Performed by: ORTHOPAEDIC SURGERY

## 2023-01-24 PROCEDURE — 1160F PR REVIEW ALL MEDS BY PRESCRIBER/CLIN PHARMACIST DOCUMENTED: ICD-10-PCS | Mod: CPTII,S$GLB,, | Performed by: ORTHOPAEDIC SURGERY

## 2023-01-24 PROCEDURE — 1125F PR PAIN SEVERITY QUANTIFIED, PAIN PRESENT: ICD-10-PCS | Mod: CPTII,S$GLB,, | Performed by: ORTHOPAEDIC SURGERY

## 2023-01-24 PROCEDURE — 99214 OFFICE O/P EST MOD 30 MIN: CPT | Mod: S$GLB,,, | Performed by: ORTHOPAEDIC SURGERY

## 2023-01-24 PROCEDURE — 99999 PR PBB SHADOW E&M-EST. PATIENT-LVL IV: CPT | Mod: PBBFAC,,, | Performed by: ORTHOPAEDIC SURGERY

## 2023-01-24 PROCEDURE — 1160F RVW MEDS BY RX/DR IN RCRD: CPT | Mod: CPTII,S$GLB,, | Performed by: ORTHOPAEDIC SURGERY

## 2023-01-24 PROCEDURE — 1125F AMNT PAIN NOTED PAIN PRSNT: CPT | Mod: CPTII,S$GLB,, | Performed by: ORTHOPAEDIC SURGERY

## 2023-01-24 PROCEDURE — 99214 PR OFFICE/OUTPT VISIT, EST, LEVL IV, 30-39 MIN: ICD-10-PCS | Mod: S$GLB,,, | Performed by: ORTHOPAEDIC SURGERY

## 2023-01-24 RX ORDER — SODIUM CHLORIDE 0.9 % (FLUSH) 0.9 %
10 SYRINGE (ML) INJECTION EVERY 6 HOURS PRN
Status: DISCONTINUED | OUTPATIENT
Start: 2023-01-24 | End: 2023-02-28 | Stop reason: CLARIF

## 2023-01-24 NOTE — H&P
Chief Complaint   Patient presents with    Left Knee - Pain         HPI:   This is a 77 y.o. who presents to clinic today complaining of left knee pain for 2 years after no known trauma. Pain is progressively worsening. No numbness or tingling. No associated signs or symptoms.    Past Medical History:   Diagnosis Date    Anticoagulant long-term use     Arthritis     Diabetes mellitus     Hypertension      Past Surgical History:   Procedure Laterality Date    BACK SURGERY      BREAST BIOPSY Right     neg--core    ROBOTIC ARTHROPLASTY, KNEE Right 8/29/2022    Procedure: ROBOTIC ARTHROPLASTY, KNEE, TOTAL ATTEMPTED,CONVERTED TO OPEN;  Surgeon: Matthew Patel MD;  Location: [facility];  Service: Orthopedics;  Laterality: Right;    TRACHEOSTOMY      age 14     Current Outpatient Medications on File Prior to Visit   Medication Sig Dispense Refill    acetaminophen (TYLENOL) 500 MG tablet Take 2 tablets (1,000 mg total) by mouth every 8 (eight) hours. 90 tablet 0    allopurinoL (ZYLOPRIM) 100 MG tablet TAKE 1 TABLET BY MOUTH EVERY DAY 90 tablet 0    amLODIPine (NORVASC) 10 MG tablet TAKE 1 TABLET BY MOUTH EVERY DAY 90 tablet 3    aspirin (ECOTRIN) 81 MG EC tablet Take 81 mg by mouth once daily.      atorvastatin (LIPITOR) 10 MG tablet TAKE 1 TABLET BY MOUTH EVERY DAY IN THE EVENING 90 tablet 3    metFORMIN (GLUCOPHAGE-XR) 500 MG ER 24hr tablet Take 1 tablet (500 mg total) by mouth 2 (two) times daily with meals. 180 tablet 3    methocarbamoL (ROBAXIN) 750 MG Tab Take 1 tablet (750 mg total) by mouth 4 (four) times daily as needed. 44 tablet 3    olmesartan (BENICAR) 40 MG tablet TAKE 1 TABLET BY MOUTH EVERY DAY 90 tablet 3    oxyCODONE (ROXICODONE) 5 MG immediate release tablet Take 1 tablet (5 mg total) by mouth every 4 (four) hours as needed for Pain. 22 tablet 0    oxyCODONE-acetaminophen (PERCOCET)  mg per tablet Take 1 tablet by mouth every 6 (six) hours as needed. 44 tablet 0    triamcinolone acetonide 0.1%  (KENALOG) 0.1 % Lotn Apply topically 2 (two) times daily as needed. 60 mL 5    glipiZIDE (GLUCOTROL) 5 MG TR24 Take 1 tablet (5 mg total) by mouth daily with breakfast. 90 tablet 3     No current facility-administered medications on file prior to visit.     Review of patient's allergies indicates:   Allergen Reactions    Clonidine      Causes chest tightness and leg swelling    Tradjenta [linagliptin]      Chest tightness and leg swelling     Family History   Problem Relation Age of Onset    Cancer Mother     Cancer Father      Social History     Socioeconomic History    Marital status:    Tobacco Use    Smoking status: Never    Smokeless tobacco: Never   Substance and Sexual Activity    Alcohol use: No    Drug use: No     Social Determinants of Health     Financial Resource Strain: Low Risk     Difficulty of Paying Living Expenses: Not hard at all   Food Insecurity: No Food Insecurity    Worried About Running Out of Food in the Last Year: Never true    Ran Out of Food in the Last Year: Never true   Transportation Needs: No Transportation Needs    Lack of Transportation (Medical): No    Lack of Transportation (Non-Medical): No   Physical Activity: Insufficiently Active    Days of Exercise per Week: 3 days    Minutes of Exercise per Session: 40 min   Stress: No Stress Concern Present    Feeling of Stress : Not at all   Social Connections: Moderately Integrated    Frequency of Communication with Friends and Family: More than three times a week    Frequency of Social Gatherings with Friends and Family: Once a week    Attends Gnosticist Services: More than 4 times per year    Active Member of Clubs or Organizations: Yes    Attends Club or Organization Meetings: More than 4 times per year    Marital Status:    Housing Stability: Low Risk     Unable to Pay for Housing in the Last Year: No    Number of Places Lived in the Last Year: 1    Unstable Housing in the Last Year: No       Review of  Systems:  Constitutional:  Denies fever or chills   Eyes:  Denies change in visual acuity   HENT:  Denies nasal congestion or sore throat   Respiratory:  Denies cough or shortness of breath   Cardiovascular:  Denies chest pain or edema   GI:  Denies abdominal pain, nausea, vomiting, bloody stools or diarrhea   :  Denies dysuria   Integument:  Denies rash   Neurologic:  Denies headache, focal weakness or sensory changes   Endocrine:  Denies polyuria or polydipsia   Lymphatic:  Denies swollen glands   Psychiatric:  Denies depression or anxiety     Physical Exam:   Constitutional:  Well developed, well nourished, no acute distress, non-toxic appearance   Integument:  Well hydrated, no rash   Lymphatic:  No lymphadenopathy noted   Neurologic:  Alert & oriented x 3, CN 2-12 normal, normal motor function, normal sensory function, no focal deficits noted   Psychiatric:  Speech and behavior appropriate   Eyes: EOMI  Gi: abdomen soft    Bilateral Knee Exam    left Knee Exam     Tenderness   The patient is experiencing tenderness in the medial joint line.    Range of Motion   Extension: 5  Flexion: 100    Muscle Strength     The patient has normal knee strength.    Tests   Aide:  Medial - positive   Lachman:  Anterior - negative      Varus: negative  Valgus: negative  Patellar Apprehension: negative    Other   Erythema: absent  Sensation: normal  Pulse: present  Swelling: mild      right Knee Exam   right knee exam performed same as contralateral side and is normal.            X-rays were performed, personally reviewed by me and findings discussed with the patient.  3 views of the left knee show tricompartmental degenerative change most pronounced in the medial compartment with Kellgren 3 changes    Primary osteoarthritis of left knee  -     Vital signs; Standing  -     Diet NPO; Standing  -     Place LALA hose; Standing  -     Place sequential compression device; Standing  -     Case Request Operating Room: ROBOTIC  ARTHROPLASTY, KNEE, TOTAL  -     Place in Outpatient; Standing  -     CT Knee Without Contrast Left; Standing    Pre-op testing  -     CBC auto differential; Future; Expected date: 01/24/2023  -     Comprehensive metabolic panel; Future; Expected date: 01/24/2023    Other orders  -     sodium chloride 0.9% flush 10 mL  -     IP VTE LOW RISK PATIENT; Standing  -     tranexamic acid (CYKLOKAPRON) 1,000 mg in sodium chloride 0.9% 100 mL  -     ceFAZolin (ANCEF) 2 g in dextrose 5 % (D5W) 50 mL IVPB        I had a long discussion with the patient regarding the benefits and risks of left TKA. They voiced understanding and wish to proceed with surgery. Consents signed in clinic.

## 2023-01-29 RX ORDER — GLIPIZIDE 5 MG/1
TABLET, FILM COATED, EXTENDED RELEASE ORAL
Qty: 90 TABLET | Refills: 0 | Status: SHIPPED | OUTPATIENT
Start: 2023-01-29 | End: 2023-04-27

## 2023-01-29 NOTE — TELEPHONE ENCOUNTER
No new care gaps identified.  [facility] Embedded Care Gaps. Reference number: [id number]. 1/29/2023   2:50:41 PM CST

## 2023-01-30 NOTE — TELEPHONE ENCOUNTER
Refill Decision Note   Shawanda Desir  is requesting a refill authorization.  Brief Assessment and Rationale for Refill:  Approve     Medication Therapy Plan:       Medication Reconciliation Completed: No   Comments:     No Care Gaps recommended.     Note composed:11:45 PM 01/29/2023

## 2023-01-31 ENCOUNTER — TELEPHONE (OUTPATIENT)
Dept: FAMILY MEDICINE | Facility: CLINIC | Age: 78
End: 2023-01-31
Payer: MEDICARE

## 2023-02-01 NOTE — TELEPHONE ENCOUNTER
----- Message from Lakia Wilburn sent at 2/1/2023 11:52 AM CST -----  Contact: self  Type:  Sooner Apoointment Request    Caller is requesting a sooner appointment.  Caller declined first available appointment listed below.  Caller will not accept being placed on the waitlist and is requesting a message be sent to doctor.  Name of Caller:Pt  When is the first available appointment?3/7  Symptoms:Pt need pre surgery clearance form   Would the patient rather a call back or a response via [facility]? [facility] Call Back Number:[phone]    Additional Information: Please call to consult...    Thank you...

## 2023-02-01 NOTE — TELEPHONE ENCOUNTER
----- Message from Gayle Hinojosa sent at 1/31/2023  2:02 PM CST -----  Regarding: PT CALLED  Name of Who is Calling:    PT CALLED      What is the request in detail:  THE PT HAS A SURGERY FOR 2/28 AND SHE NEEDS TO HAVE A CLEARANCE FROM SIGNED BY THE OFFICE BEFORE HAND. PLEASE ADVISE         Can the clinic reply by [facility]: NO          What Number to Call Back if not in [facility]: [phone] (home)

## 2023-02-23 ENCOUNTER — LAB VISIT (OUTPATIENT)
Dept: LAB | Facility: HOSPITAL | Age: 78
End: 2023-02-23
Attending: INTERNAL MEDICINE
Payer: MEDICARE

## 2023-02-23 ENCOUNTER — OFFICE VISIT (OUTPATIENT)
Dept: FAMILY MEDICINE | Facility: CLINIC | Age: 78
End: 2023-02-23
Payer: MEDICARE

## 2023-02-23 VITALS
DIASTOLIC BLOOD PRESSURE: 66 MMHG | OXYGEN SATURATION: 98 % | HEART RATE: 94 BPM | SYSTOLIC BLOOD PRESSURE: 132 MMHG | WEIGHT: 188.94 LBS | BODY MASS INDEX: 34.56 KG/M2

## 2023-02-23 DIAGNOSIS — E11.29 TYPE 2 DIABETES MELLITUS WITH MICROALBUMINURIA, WITHOUT LONG-TERM CURRENT USE OF INSULIN: ICD-10-CM

## 2023-02-23 DIAGNOSIS — R80.9 TYPE 2 DIABETES MELLITUS WITH MICROALBUMINURIA, WITHOUT LONG-TERM CURRENT USE OF INSULIN: ICD-10-CM

## 2023-02-23 DIAGNOSIS — Z01.818 PRE-OP EXAMINATION: Primary | ICD-10-CM

## 2023-02-23 DIAGNOSIS — I10 ESSENTIAL HYPERTENSION: ICD-10-CM

## 2023-02-23 DIAGNOSIS — I50.32 CHRONIC DIASTOLIC CONGESTIVE HEART FAILURE: ICD-10-CM

## 2023-02-23 LAB
ALBUMIN SERPL BCP-MCNC: 3.3 G/DL (ref 3.5–5.2)
ALP SERPL-CCNC: 169 U/L (ref 55–135)
ALT SERPL W/O P-5'-P-CCNC: 7 U/L (ref 10–44)
ANION GAP SERPL CALC-SCNC: 9 MMOL/L (ref 8–16)
AST SERPL-CCNC: 16 U/L (ref 10–40)
BILIRUB SERPL-MCNC: 0.4 MG/DL (ref 0.1–1)
BUN SERPL-MCNC: 12 MG/DL (ref 8–23)
CALCIUM SERPL-MCNC: 9.5 MG/DL (ref 8.7–10.5)
CHLORIDE SERPL-SCNC: 103 MMOL/L (ref 95–110)
CHOLEST SERPL-MCNC: 178 MG/DL (ref 120–199)
CHOLEST/HDLC SERPL: 4.1 {RATIO} (ref 2–5)
CO2 SERPL-SCNC: 27 MMOL/L (ref 23–29)
CREAT SERPL-MCNC: 0.8 MG/DL (ref 0.5–1.4)
EST. GFR  (NO RACE VARIABLE): >60 ML/MIN/1.73 M^2
ESTIMATED AVG GLUCOSE: 151 MG/DL (ref 68–131)
GLUCOSE SERPL-MCNC: 135 MG/DL (ref 70–110)
HBA1C MFR BLD: 6.9 % (ref 4–5.6)
HDLC SERPL-MCNC: 43 MG/DL (ref 40–75)
HDLC SERPL: 24.2 % (ref 20–50)
LDLC SERPL CALC-MCNC: 121.2 MG/DL (ref 63–159)
NONHDLC SERPL-MCNC: 135 MG/DL
POTASSIUM SERPL-SCNC: 4.8 MMOL/L (ref 3.5–5.1)
PROT SERPL-MCNC: 7.4 G/DL (ref 6–8.4)
SODIUM SERPL-SCNC: 139 MMOL/L (ref 136–145)
TRIGL SERPL-MCNC: 69 MG/DL (ref 30–150)

## 2023-02-23 PROCEDURE — 1101F PT FALLS ASSESS-DOCD LE1/YR: CPT | Mod: CPTII,S$GLB,, | Performed by: NURSE PRACTITIONER

## 2023-02-23 PROCEDURE — 1125F PR PAIN SEVERITY QUANTIFIED, PAIN PRESENT: ICD-10-PCS | Mod: CPTII,S$GLB,, | Performed by: NURSE PRACTITIONER

## 2023-02-23 PROCEDURE — 99214 PR OFFICE/OUTPT VISIT, EST, LEVL IV, 30-39 MIN: ICD-10-PCS | Mod: S$GLB,,, | Performed by: NURSE PRACTITIONER

## 2023-02-23 PROCEDURE — 99999 PR PBB SHADOW E&M-EST. PATIENT-LVL IV: ICD-10-PCS | Mod: PBBFAC,,, | Performed by: NURSE PRACTITIONER

## 2023-02-23 PROCEDURE — 3288F PR FALLS RISK ASSESSMENT DOCUMENTED: ICD-10-PCS | Mod: CPTII,S$GLB,, | Performed by: NURSE PRACTITIONER

## 2023-02-23 PROCEDURE — 99499 UNLISTED E&M SERVICE: CPT | Mod: S$GLB,,, | Performed by: NURSE PRACTITIONER

## 2023-02-23 PROCEDURE — 1101F PR PT FALLS ASSESS DOC 0-1 FALLS W/OUT INJ PAST YR: ICD-10-PCS | Mod: CPTII,S$GLB,, | Performed by: NURSE PRACTITIONER

## 2023-02-23 PROCEDURE — 99999 PR PBB SHADOW E&M-EST. PATIENT-LVL IV: CPT | Mod: PBBFAC,,, | Performed by: NURSE PRACTITIONER

## 2023-02-23 PROCEDURE — 1159F MED LIST DOCD IN RCRD: CPT | Mod: CPTII,S$GLB,, | Performed by: NURSE PRACTITIONER

## 2023-02-23 PROCEDURE — 80061 LIPID PANEL: CPT | Performed by: INTERNAL MEDICINE

## 2023-02-23 PROCEDURE — 3288F FALL RISK ASSESSMENT DOCD: CPT | Mod: CPTII,S$GLB,, | Performed by: NURSE PRACTITIONER

## 2023-02-23 PROCEDURE — 83036 HEMOGLOBIN GLYCOSYLATED A1C: CPT | Performed by: INTERNAL MEDICINE

## 2023-02-23 PROCEDURE — 99214 OFFICE O/P EST MOD 30 MIN: CPT | Mod: S$GLB,,, | Performed by: NURSE PRACTITIONER

## 2023-02-23 PROCEDURE — 36415 COLL VENOUS BLD VENIPUNCTURE: CPT | Mod: PO | Performed by: INTERNAL MEDICINE

## 2023-02-23 PROCEDURE — 1159F PR MEDICATION LIST DOCUMENTED IN MEDICAL RECORD: ICD-10-PCS | Mod: CPTII,S$GLB,, | Performed by: NURSE PRACTITIONER

## 2023-02-23 PROCEDURE — 80053 COMPREHEN METABOLIC PANEL: CPT | Performed by: INTERNAL MEDICINE

## 2023-02-23 PROCEDURE — 3075F PR MOST RECENT SYSTOLIC BLOOD PRESS GE 130-139MM HG: ICD-10-PCS | Mod: CPTII,S$GLB,, | Performed by: NURSE PRACTITIONER

## 2023-02-23 PROCEDURE — 99499 RISK ADDL DX/OHS AUDIT: ICD-10-PCS | Mod: S$GLB,,, | Performed by: NURSE PRACTITIONER

## 2023-02-23 PROCEDURE — 3075F SYST BP GE 130 - 139MM HG: CPT | Mod: CPTII,S$GLB,, | Performed by: NURSE PRACTITIONER

## 2023-02-23 PROCEDURE — 3078F PR MOST RECENT DIASTOLIC BLOOD PRESSURE < 80 MM HG: ICD-10-PCS | Mod: CPTII,S$GLB,, | Performed by: NURSE PRACTITIONER

## 2023-02-23 PROCEDURE — 1125F AMNT PAIN NOTED PAIN PRSNT: CPT | Mod: CPTII,S$GLB,, | Performed by: NURSE PRACTITIONER

## 2023-02-23 PROCEDURE — 3078F DIAST BP <80 MM HG: CPT | Mod: CPTII,S$GLB,, | Performed by: NURSE PRACTITIONER

## 2023-02-23 NOTE — PATIENT INSTRUCTIONS
Cleared for surgery.  When I call you, fill in the number on the blank.    At some point:  Eye exam  DEXA scheduled on 2/28.    To DO:  Shingles and pneumonia vaccines.    If you have concerns or questions, please do not hesitate to call.  If you have any questions, please do not hesitate to call.  You can reach us at [phone] Monday through Friday  Or call Dr. Dubose.    Thank you for using the [facility]!    KELECHI Soares, CNP, FNP-BC  [facility]    To rate your experience with MARCOS Soares, click on the link below:      [email or web address]

## 2023-02-23 NOTE — PROGRESS NOTES
Patient ID: Shawanda Desir is a 77 y.o. female.    Chief Complaint: No chief complaint on file.      Shawanda Desir is in the office for preop exam.   Knee replacement scheduled 3/3/2023.  She had the other knee replaced about a year ago.  Surgery went well.  Doing well.    HPI    Past Medical History:   Diagnosis Date    Anticoagulant long-term use     Arthritis     Diabetes mellitus     Hypertension             Current Outpatient Medications:     acetaminophen (TYLENOL) 500 MG tablet, Take 2 tablets (1,000 mg total) by mouth every 8 (eight) hours., Disp: 90 tablet, Rfl: 0    allopurinoL (ZYLOPRIM) 100 MG tablet, TAKE 1 TABLET BY MOUTH EVERY DAY, Disp: 90 tablet, Rfl: 0    amLODIPine (NORVASC) 10 MG tablet, TAKE 1 TABLET BY MOUTH EVERY DAY, Disp: 90 tablet, Rfl: 3    aspirin (ECOTRIN) 81 MG EC tablet, Take 81 mg by mouth once daily., Disp: , Rfl:     atorvastatin (LIPITOR) 10 MG tablet, TAKE 1 TABLET BY MOUTH EVERY DAY IN THE EVENING, Disp: 90 tablet, Rfl: 3    glipiZIDE 5 MG TR24, TAKE 1 TABLET BY MOUTH EVERY DAY WITH BREAKFAST, Disp: 90 tablet, Rfl: 0    metFORMIN (GLUCOPHAGE-XR) 500 MG ER 24hr tablet, Take 1 tablet (500 mg total) by mouth 2 (two) times daily with meals., Disp: 180 tablet, Rfl: 3    methocarbamoL (ROBAXIN) 750 MG Tab, Take 1 tablet (750 mg total) by mouth 4 (four) times daily as needed., Disp: 44 tablet, Rfl: 3    olmesartan (BENICAR) 40 MG tablet, TAKE 1 TABLET BY MOUTH EVERY DAY, Disp: 90 tablet, Rfl: 3    oxyCODONE (ROXICODONE) 5 MG immediate release tablet, Take 1 tablet (5 mg total) by mouth every 4 (four) hours as needed for Pain., Disp: 22 tablet, Rfl: 0    oxyCODONE-acetaminophen (PERCOCET)  mg per tablet, Take 1 tablet by mouth every 6 (six) hours as needed., Disp: 44 tablet, Rfl: 0    triamcinolone acetonide 0.1% (KENALOG) 0.1 % Lotn, Apply topically 2 (two) times daily as needed., Disp: 60 mL, Rfl: 5    Current Facility-Administered Medications:     sodium chloride 0.9% flush  10/27/2017  EUFLEXXA  (SERIES OF 3)   RIGHT KNEE. NO AUTHORIZATION REQUIRED. CAN BUY& BILL. PER MEDICARE GUIDELINES.  AP 10 mL, 10 mL, Intravenous, Q6H PRN, Matthew Patel MD    The 10-year ASCVD risk score (Nirali DK, et al., 2019) is: 28.3%    Values used to calculate the score:      Age: 77 years      Sex: Female      Is Non- : Yes      Diabetic: Yes      Tobacco smoker: No      Systolic Blood Pressure: 122 mmHg      Is BP treated: Yes      HDL Cholesterol: 44 mg/dL      Total Cholesterol: 172 mg/dL         Wt Readings from Last 3 Encounters:   01/24/23 84.8 kg (187 lb)   01/12/23 84.8 kg (187 lb)   12/07/22 85 kg (187 lb 6.3 oz)     Temp Readings from Last 3 Encounters:   08/29/22 97.5 °F (36.4 °C)   04/25/22 98.2 °F (36.8 °C) (Oral)   03/03/21 98.1 °F (36.7 °C)     BP Readings from Last 3 Encounters:   12/07/22 122/70   10/17/22 (!) 157/78   09/15/22 126/64     Pulse Readings from Last 3 Encounters:   12/07/22 85   10/17/22 96   09/15/22 78     Resp Readings from Last 3 Encounters:   08/29/22 18   04/25/22 18   09/18/19 18     PF Readings from Last 3 Encounters:   No data found for PF     SpO2 Readings from Last 3 Encounters:   12/07/22 99%   09/15/22 96%   08/29/22 98%        Lab Results   Component Value Date    HGBA1C 6.5 (H) 10/25/2022    HGBA1C 7.9 (H) 06/08/2022    HGBA1C 9.7 (H) 05/03/2022     Lab Results   Component Value Date    LDLCALC 113.6 09/07/2021    CREATININE 0.78 08/29/2022     Denies current or past use of alcohol, drugs and of smoking.    Review of Systems        Objective:     Alert, coop 77 y.o. female patient in no distress, is not ill appearing.    Vitals:    02/23/23 0906   BP: 132/66   Pulse: 94   SpO2: 98%   Weight: 85.7 kg (188 lb 15 oz)         VS reviewed.  VS stable.   CC, nursing note, medications & PMH all reviewed today.    Head:  Normocephalic, atraumatic.             Normocephalic, without obvious abnormality, atraumatic    EENT:  Ext ears  without lesions, EACs clear  bilateral              TM:  light reflex  pearly gray  bilateral    Lymph nodes:  without submental,  parotid, anterior cervical, posterior cervical, and supraclavicular lymph nodes palp.    Resp:  Breathing unlabored.  Lungs CTA bilat.  Moves air to bases bilat.  Resp excursion symmetrical.    Heart:  Heart regular rate., Regular rate and rhythm.  , No murmur., Cartoid pulses even, symmetrical.  Bruit -- None noted, Radial pulses 3 +, is symmetrical.  L  = R., and Post tib pulses 2 +,  is symmetrical. R = L.    ABD:  Exam umbilicus normal, symmetric, no masses palpable, no organomegaly, bowel sounds are normal, soft, nontender, spleen not palpable. No CVAT.            Positive findings:  right upper quadrant healed surgical scar noted, diagonally from mid abd sloping inferiorly and laterally across the RUQ.     MS:  Ambulates 2. Mild impairment: Walks independently, uses assistive devices, slower speed, mild gait deviations. with ambulation aid Cane   negative findings: back NT to palp.     NEURO:  alert, oriented x3  speech normal in context and clarity  memory intact grossly  no involuntary movements - tremors    Skin:  Skin color, texture, turgor normal. No rashes or lesions or new injuries noted.  Healed surgical scar as described above under abd exam.    Psych:  Responds appropriately throughout the visit.               Mood:  pleasant and appropriate               Appearance: well-groomed, appropriate .               Affect:  congruent and appropriate    Pre-op examination  Comments:  cleared for surgery.    Type 2 diabetes mellitus with microalbuminuria, without long-term current use of insulin  Comments:  controlled    Essential hypertension  Comments:  controlled    Chronic diastolic congestive heart failure  Comments:  stable      Screening recommendations appropriate to age and health status were reviewed.    Revised cardiac risk index (RCRI)  6 independent predictors of major cardiac complications[1]    High-risk type of surgery (examples include vascular surgery and any open intraperitoneal or  intrathoracic procedures) 0   History of ischemic heart disease (history of myocardial infarction or a positive exercise test, current complaint of chest pain considered to be secondary to myocardial ischemia, use of nitrate therapy, or ECG with pathological Q waves; do not count prior coronary revascularization procedure unless one of the other criteria for ischemic heart disease is present) 0   History of heart failure 1   History of cerebrovascular disease 0   Diabetes mellitus requiring treatment with insulin 1   Preoperative serum creatinine >2.0 mg/dL (177 micromol/L) 0   Rate of cardiac death, nonfatal myocardial infarction, and nonfatal cardiac arrest according to the number of predictors[2]    No risk factors - 0.4% (95% CI 0.1-0.8)    1 risk factor - 1.0% (95% CI 0.5-1.4)    2 risk factors - 2.4% (95% CI 1.3-3.5) x   3 or more risk factors - 5.4% (95% CI 2.8-7.9)    Rate of myocardial infarction, pulmonary edema, ventricular fibrillation, primary cardiac arrest, and complete heart block[1]    No risk factors - 0.5% (95% CI 0.2-1.1)    1 risk factor - 1.3% (95% CI 0.7-2.1)    2 risk factors - 3.6% (95% CI 2.1-5.6) x   3 or more risk factors - 9.1% (95% CI 5.5-13.8)    ECG: electrocardiogram.  Normal sinus rhythm   Nonspecific T wave abnormality   Abnormal ECG   No previous ECGs available   Confirmed by Anand Nicholson MD ([id number]) on 4/28/2022 1:15:25 PM     RCRI risk factors include: no known RCRI risk factors. As such, per RCRI the risk of cardiac death, nonfatal myocardial infarction, or nonfatal cardiac arrest is 2.4% and the risk of myocardial infarction, pulmonary edema, ventricular fibrillation, primary cardiac arrest, or complete heart block is 3.6%.  Overall this patient can be considered low risk for this low risk procedure. No further cardiac testing is recommended at this time.     Patient denies any symptoms (as per HPI) concerning for undiagnosed lung disease including SARBJIT. Would not recommend  obtaining chest X-ray, sleep study, or PFTs at this time. Patient is a non-smoker. We discussed the benefits of early mobilization and deep breathing after surgery.      Screened patient for alcohol misuse, use of illicit drugs, and personal or family history of anesthetic complications or bleeding diathesis and no substantial concerns were identified.    All current medications were reviewed and at this time no changes to medications are recommended prior to surgery.     We discussed wellness/screening measures.  She agrees to DEXA before surgery.  She has an eye place she goes for her exam and prefers to use that.    I recommend use of standard pre-op and post-op precautions for this patient. In my opinion, she is medically optimized for this procedure, and can proceed without further evaluation.

## 2023-02-27 ENCOUNTER — TELEPHONE (OUTPATIENT)
Dept: FAMILY MEDICINE | Facility: CLINIC | Age: 78
End: 2023-02-27

## 2023-02-27 ENCOUNTER — TELEPHONE (OUTPATIENT)
Dept: ORTHOPEDICS | Facility: CLINIC | Age: 78
End: 2023-02-27
Payer: MEDICARE

## 2023-02-27 NOTE — TELEPHONE ENCOUNTER
----- Message from Verónica Ugalde, Patient Care Assistant sent at 2/27/2023  3:31 PM CST -----  Contact: self  Pt is calling to get a call back in regards to her upcoming knee procedure. Please call back to advise [phone]  thanks

## 2023-02-27 NOTE — TELEPHONE ENCOUNTER
----- Message from Thierry Estevez sent at 2/27/2023 12:11 PM CST -----  Who Called: patient         Name of Test (Lab/Mammo/Etc): lab          Date of Test:2/27         Ordering Provider: Elizabeth         Where the test was performed: [facility] lab          Best Call Back Number: [phone]           Additional Information:  A1C numbers before 2/28

## 2023-02-28 ENCOUNTER — HOSPITAL ENCOUNTER (OUTPATIENT)
Dept: RADIOLOGY | Facility: HOSPITAL | Age: 78
Discharge: HOME | End: 2023-02-28
Attending: INTERNAL MEDICINE
Payer: MEDICARE

## 2023-02-28 DIAGNOSIS — Z78.0 MENOPAUSE: ICD-10-CM

## 2023-02-28 PROCEDURE — 77081 DEXA BONE DENSITY APPENDICULAR SKELETON: ICD-10-PCS | Mod: 26,,, | Performed by: RADIOLOGY

## 2023-02-28 PROCEDURE — 77081 DXA BONE DENSITY APPENDICULR: CPT | Mod: 26,,, | Performed by: RADIOLOGY

## 2023-02-28 PROCEDURE — 77081 DXA BONE DENSITY APPENDICULR: CPT | Mod: TC,PO

## 2023-03-01 ENCOUNTER — TELEPHONE (OUTPATIENT)
Dept: FAMILY MEDICINE | Facility: CLINIC | Age: 78
End: 2023-03-01
Payer: MEDICARE

## 2023-03-01 NOTE — TELEPHONE ENCOUNTER
----- Message from Thierry Estevez sent at 2/27/2023 12:11 PM CST -----  Who Called: patient         Name of Test (Lab/Mammo/Etc): lab          Date of Test:2/27         Ordering Provider: Elizabeth         Where the test was performed: [facility] lab          Best Call Back Number: [phone]           Additional Information:  A1C numbers before 2/28

## 2023-03-02 ENCOUNTER — TELEPHONE (OUTPATIENT)
Dept: FAMILY MEDICINE | Facility: CLINIC | Age: 78
End: 2023-03-02
Payer: MEDICARE

## 2023-03-07 ENCOUNTER — TELEPHONE (OUTPATIENT)
Dept: FAMILY MEDICINE | Facility: CLINIC | Age: 78
End: 2023-03-07
Payer: MEDICARE

## 2023-03-07 NOTE — TELEPHONE ENCOUNTER
----- Message from Ann Lemons sent at 3/2/2023  8:14 AM CST -----  Contact: [phone]  Type:  Patient Returning Call    Who Called:  Pt   Who Left Message for Patient:  Woody  Does the patient know what this is regarding?:  Yes   Best Call Back Number:  [phone]    Additional Information:  Pt returning call from yesterday afternoon

## 2023-03-13 PROBLEM — M17.12 PRIMARY OSTEOARTHRITIS OF LEFT KNEE: Status: ACTIVE | Noted: 2023-03-13

## 2023-03-14 PROCEDURE — G0180 MD CERTIFICATION HHA PATIENT: HCPCS | Mod: ,,, | Performed by: ORTHOPAEDIC SURGERY

## 2023-03-14 PROCEDURE — G0180 PR HOME HEALTH MD CERTIFICATION: ICD-10-PCS | Mod: ,,, | Performed by: ORTHOPAEDIC SURGERY

## 2023-03-20 DIAGNOSIS — M17.12 PRIMARY OSTEOARTHRITIS OF LEFT KNEE: Primary | ICD-10-CM

## 2023-03-20 NOTE — TELEPHONE ENCOUNTER
----- Message from Carter Alatorre sent at 3/20/2023 11:39 AM CDT -----  Type:  RX Refill Request    Who Called:  Deneen/ Eliz [facility]  Refill or New Rx:  Refill  RX Name and Strength:  oxyCODONE (ROXICODONE) 5 MG immediate release tablet      How is the patient currently taking it? (ex. 1XDay):  1 Tablet X4 hours  Is this a 30 day or 90 day RX:  22 Tablets    Preferred Pharmacy with phone number:    [id number] - [place] - [address]  [place] [address]  Phone: [phone] Fax: [phone]    Local or Mail Order:  Local  Ordering Provider:  Jorge Hernandez Call Back Number:  [phone]  Additional Information:

## 2023-03-21 RX ORDER — OXYCODONE HYDROCHLORIDE 5 MG/1
5 TABLET ORAL EVERY 4 HOURS PRN
Qty: 22 TABLET | Refills: 0 | Status: SHIPPED | OUTPATIENT
Start: 2023-03-21 | End: 2023-05-23 | Stop reason: ALTCHOICE

## 2023-03-28 ENCOUNTER — OFFICE VISIT (OUTPATIENT)
Dept: ORTHOPEDICS | Facility: CLINIC | Age: 78
End: 2023-03-28
Payer: MEDICARE

## 2023-03-28 ENCOUNTER — HOSPITAL ENCOUNTER (OUTPATIENT)
Dept: RADIOLOGY | Facility: HOSPITAL | Age: 78
Discharge: HOME | End: 2023-03-28
Attending: ORTHOPAEDIC SURGERY
Payer: MEDICARE

## 2023-03-28 VITALS — BODY MASS INDEX: 36.71 KG/M2 | WEIGHT: 187 LBS | HEIGHT: 60 IN

## 2023-03-28 DIAGNOSIS — M17.12 PRIMARY OSTEOARTHRITIS OF LEFT KNEE: ICD-10-CM

## 2023-03-28 DIAGNOSIS — M17.12 PRIMARY OSTEOARTHRITIS OF LEFT KNEE: Primary | ICD-10-CM

## 2023-03-28 PROCEDURE — 73562 XR KNEE ORTHO LEFT: ICD-10-PCS | Mod: 26,LT,, | Performed by: RADIOLOGY

## 2023-03-28 PROCEDURE — 99024 POSTOP FOLLOW-UP VISIT: CPT | Mod: S$GLB,,, | Performed by: ORTHOPAEDIC SURGERY

## 2023-03-28 PROCEDURE — 1159F MED LIST DOCD IN RCRD: CPT | Mod: CPTII,S$GLB,, | Performed by: ORTHOPAEDIC SURGERY

## 2023-03-28 PROCEDURE — 1125F PR PAIN SEVERITY QUANTIFIED, PAIN PRESENT: ICD-10-PCS | Mod: CPTII,S$GLB,, | Performed by: ORTHOPAEDIC SURGERY

## 2023-03-28 PROCEDURE — 99999 PR PBB SHADOW E&M-EST. PATIENT-LVL II: CPT | Mod: PBBFAC,,, | Performed by: ORTHOPAEDIC SURGERY

## 2023-03-28 PROCEDURE — 73560 XR KNEE ORTHO LEFT: ICD-10-PCS | Mod: 26,RT,, | Performed by: RADIOLOGY

## 2023-03-28 PROCEDURE — 99999 PR PBB SHADOW E&M-EST. PATIENT-LVL II: ICD-10-PCS | Mod: PBBFAC,,, | Performed by: ORTHOPAEDIC SURGERY

## 2023-03-28 PROCEDURE — 99024 PR POST-OP FOLLOW-UP VISIT: ICD-10-PCS | Mod: S$GLB,,, | Performed by: ORTHOPAEDIC SURGERY

## 2023-03-28 PROCEDURE — 1159F PR MEDICATION LIST DOCUMENTED IN MEDICAL RECORD: ICD-10-PCS | Mod: CPTII,S$GLB,, | Performed by: ORTHOPAEDIC SURGERY

## 2023-03-28 PROCEDURE — 1125F AMNT PAIN NOTED PAIN PRSNT: CPT | Mod: CPTII,S$GLB,, | Performed by: ORTHOPAEDIC SURGERY

## 2023-03-28 PROCEDURE — 73562 X-RAY EXAM OF KNEE 3: CPT | Mod: 26,LT,, | Performed by: RADIOLOGY

## 2023-03-28 PROCEDURE — 1160F RVW MEDS BY RX/DR IN RCRD: CPT | Mod: CPTII,S$GLB,, | Performed by: ORTHOPAEDIC SURGERY

## 2023-03-28 PROCEDURE — 73560 X-RAY EXAM OF KNEE 1 OR 2: CPT | Mod: 26,RT,, | Performed by: RADIOLOGY

## 2023-03-28 PROCEDURE — 1160F PR REVIEW ALL MEDS BY PRESCRIBER/CLIN PHARMACIST DOCUMENTED: ICD-10-PCS | Mod: CPTII,S$GLB,, | Performed by: ORTHOPAEDIC SURGERY

## 2023-03-28 PROCEDURE — 73560 X-RAY EXAM OF KNEE 1 OR 2: CPT | Mod: TC,PO,RT

## 2023-03-28 RX ORDER — METHOCARBAMOL 750 MG/1
750 TABLET, FILM COATED ORAL 4 TIMES DAILY PRN
Qty: 44 TABLET | Refills: 3 | Status: SHIPPED | OUTPATIENT
Start: 2023-03-28 | End: 2023-08-10

## 2023-03-28 RX ORDER — TRAMADOL HYDROCHLORIDE 50 MG/1
50 TABLET ORAL EVERY 4 HOURS PRN
Qty: 44 TABLET | Refills: 0 | Status: SHIPPED | OUTPATIENT
Start: 2023-03-28 | End: 2023-05-23 | Stop reason: ALTCHOICE

## 2023-03-28 NOTE — PROGRESS NOTES
Chief Complaint   Patient presents with    Left Knee - Pain       HPI:  77 y.o. female returns to clinic today status post left total knee arthroplasty 2 weeks ago. Pain is mild. Patient is compliant most of the time with restrictions.     left knee: ROM 5-95. Overall normal alignment. Incision healed. No erythema or fluctuance. Stable to stress. Skin intact. Compartments soft. NVI distally.     X-rays were performed today, personally reviewed by me and findings discussed with the patient.  3 views of the left knee show implants intact in good position    Primary osteoarthritis of left knee    Other orders  -     methocarbamoL (ROBAXIN) 750 MG Tab; Take 1 tablet (750 mg total) by mouth 4 (four) times daily as needed.  Dispense: 44 tablet; Refill: 3  -     traMADoL (ULTRAM) 50 mg tablet; Take 1 tablet (50 mg total) by mouth every 4 (four) hours as needed for Pain.  Dispense: 44 tablet; Refill: 0        Begin outpatient. RTC 6 weeks Cindy.

## 2023-03-29 ENCOUNTER — CLINICAL SUPPORT (OUTPATIENT)
Dept: REHABILITATION | Facility: HOSPITAL | Age: 78
End: 2023-03-29
Attending: ORTHOPAEDIC SURGERY
Payer: MEDICARE

## 2023-03-29 DIAGNOSIS — M25.662 STIFFNESS OF LEFT KNEE: ICD-10-CM

## 2023-03-29 DIAGNOSIS — R26.9 ALTERED GAIT: ICD-10-CM

## 2023-03-29 DIAGNOSIS — R29.898 WEAKNESS OF BOTH LOWER EXTREMITIES: ICD-10-CM

## 2023-03-29 DIAGNOSIS — M17.12 PRIMARY OSTEOARTHRITIS OF LEFT KNEE: ICD-10-CM

## 2023-03-29 DIAGNOSIS — M25.562 ACUTE PAIN OF LEFT KNEE: Primary | ICD-10-CM

## 2023-03-29 PROBLEM — R26.2 DIFFICULTY WALKING: Status: RESOLVED | Noted: 2022-09-16 | Resolved: 2023-03-29

## 2023-03-29 PROBLEM — M25.60 DECREASED RANGE OF MOTION: Status: RESOLVED | Noted: 2022-09-16 | Resolved: 2023-03-29

## 2023-03-29 PROBLEM — G89.29 CHRONIC PAIN OF RIGHT KNEE: Status: RESOLVED | Noted: 2022-09-16 | Resolved: 2023-03-29

## 2023-03-29 PROBLEM — M62.81 MUSCLE WEAKNESS: Status: RESOLVED | Noted: 2022-09-16 | Resolved: 2023-03-29

## 2023-03-29 PROBLEM — M25.561 CHRONIC PAIN OF RIGHT KNEE: Status: RESOLVED | Noted: 2022-09-16 | Resolved: 2023-03-29

## 2023-03-29 PROCEDURE — 97110 THERAPEUTIC EXERCISES: CPT | Mod: PN

## 2023-03-29 PROCEDURE — 97112 NEUROMUSCULAR REEDUCATION: CPT | Mod: PN

## 2023-03-29 PROCEDURE — 97162 PT EVAL MOD COMPLEX 30 MIN: CPT | Mod: PN

## 2023-03-29 NOTE — PATIENT INSTRUCTIONS
"Education provided:   PT provided education and demonstration of HEP to include:   Long sitting alternating quad set with towel roll under knees 5" holds  Long sitting alternating total knee extension with medium bolster, 5" holds  Long sitting alternating total knee extension with small bolster, 5" holds   Long sitting alternating total knee extension with small bolster + mini straight leg raise, 2-3" holds       Pt was instructed to perform these daily with "little bits lots" philosophy.  Pt was given instructions to stop use of HEP if there are any adverse reactions/responses and f/u with PT next treatment to discuss.    Home Exercises Provided: Exercises were reviewed and Shawanda was able to demonstrate them prior to the end of the session.  Shawanda demonstrated fair  understanding of the education provided.   See EMR under Patient Instructions for exercises provided during therapy sessions.  "

## 2023-03-29 NOTE — PLAN OF CARE
[facility]   Physical Therapy Initial Evaluation     Date: 3/29/2023   Name: Shawanda Desir  Clinic Number: [id number]    Therapy Diagnosis:   Encounter Diagnoses   Name Primary?    Primary osteoarthritis of left knee     Acute pain of left knee Yes    Stiffness of left knee     Weakness of both lower extremities     Altered gait      Physician: Matthew Patel MD    Physician Orders: PT Eval and Treat   Medical Diagnosis from Referral: Primary osteoarthritis of left knee [M17.12]  Evaluation Date: 3/29/2023  Authorization Period Expiration: 4/26/2023  Plan of Care Expiration: 6/30/2023  Progress Note Due: 4/28/2023  Visit # / Visits authorized: 1 / 1   FOTO: Issued Visit #: 1/3, (capture on visit 1, 5, 10) first on 3/29/2023    Precautions: Standard, DM, prvious Right TKA, prior lumbar Sx    DOS, procedure:  s/p Left TKA on 3/13/2023    Time In: 11:30  Time Out: 12:20  Total Appointment Time (timed & untimed codes): 50 minutes      SUBJECTIVE   Date of onset: Left knee pain x 2 years    DOS, procedure:  s/p Left TKA on 3/13/2023    History of current condition - Shawanda reports: she has been having c/o Bilateral knees the past 2 years.  She elected to have Right knee replacement first last year and now elected to have Left TKA 3/13/2023.  She feels like she has recovered better with this Left knee than with the previous Right.    Falls: none    Imaging, bone scan films:   XR KNEE ORTHO LEFT FINDINGS:   3/28/2023  Recently placed left total knee prosthesis is well aligned and positioned.  Skin staples remain in place.  There is juxta-articular soft tissue swelling.  A remote right total knee replacement prosthesis is well aligned.  There is no evidence of acute fracture.    Prior Therapy: yes  Social History: Shawanda reports [place] is staying with her to help, grandson (43 y.o.) is staying with her as well.  1-story level entry home.   Occupation: retired,   Prior Level of  Function: progressive decline in mobility and tolerance the past 2 years   Current Level of Function: limited overall s/p Right TKA 2022, Left TKA 3/13/2023    Pain:  Current 2/10, worst 7/10, best 1/10   Location: Left knee grossly, mostly anterior   Description: Aching, Dull, Throbbing, Tight, and Sharp  Aggravating Factors: Sitting, Standing, Walking, Night Time, Morning, Extension, Flexing, and Getting out of bed/chair  Easing Factors: change positions, ice, rest, massage, meds    Patients goals: be able to walk, ride her bike, do her household chores again     Medical History:   Past Medical History:   Diagnosis Date    Anticoagulant long-term use     Arthritis     Diabetes mellitus     Hyperlipidemia     Hypertension      Surgical History:   Shawanda Desir  has a past surgical history that includes Back surgery; Breast biopsy (Right); Tracheostomy; robotic arthroplasty, knee (Right, 08/29/2022); and robotic arthroplasty, knee (Left, 3/13/2023).    Medications:   Shawanda has a current medication list which includes the following prescription(s): acetaminophen, allopurinol, amlodipine, aspirin, atorvastatin, glipizide, ibuprofen, metformin, methocarbamol, olmesartan, oxycodone, pregabalin, tramadol, and triamcinolone acetonide 0.1%.    Allergies:   Review of patient's allergies indicates:   Allergen Reactions    Clonidine      Causes chest tightness and leg swelling    Tradjenta [linagliptin]      Chest tightness and leg swelling      OBJECTIVE     Transfers:  sit to stand from chair with moderate use of Bilateral Upper Extremities.  Able to position Bilateral Lower Extremity under her ROXANNE fairly well during the transfer.    Gait:  walks with no AD household levels with moderate Left knee extension lag, mild Right knee extension lag.  Mildly antalgic gait.  Uses SPC with community ambulation with similar pattern.    Range of Motion:     Left  Right    Knee Extension  -13/-10  -14/-10    Knee Flexion  97/103   "100/105     Strength:     Left  Right    Knee Flexion 4-/5 4+/5    Knee Extension 4+/5 5-/5    Hip External Rotation 4+/5    4+/5        Recruitment/tone:  Pt displays Fair (-) Left quad tone while performing long sitting quad set.            Pt displays Fair (+) Right quad tone while performing long sitting quad set.    SLR:  Pt able to perform Left SLR with -21* knee extension lag.            Pt able to perform Right SLR with -19* knee extension lag     Patella glide: moderate tightness about patella with scar tissue    Flexibility:  Decreased flexibility noted:  Gluts:                           Right=moderate, Left=moderate  Piriformis:                    Right=moderate/mild, Left=moderate/mild  Hamstrings:                 Right=moderate/mild, Left=moderate/mild  Calves:                        Right=severe/moderate, Left=severe/moderate    Limitation/Restriction for FOTO LEFS, post-op knee Survey    Therapist reviewed FOTO scores for Shawanda Desir on 3/29/2023.   FOTO documents entered into [email or web address] - see Media section.    Limitation Score: 82%.  Predicted 49%.         TREATMENT     Total Treatment time (time-based codes) separate from Evaluation: 35 minutes      Shawanda received the treatments listed below:      manual therapy techniques: for 10 minutes, including:  Patella mobs, scar tissue massage, Tibial IR, knee extension stretch/Passive Range of Motion as tolerated     neuromuscular re-education activities for 15 minutes. The following activities were included:  Long sitting alternating quad set with towel roll under knees 5" holds, x 4'  Long sitting alternating total knee extension with medium bolster, 5" holds x 4'  Long sitting alternating total knee extension with small bolster, 5" holds x 4'  Long sitting alternating total knee extension with small bolster + mini SLR x 2'      therapeutic exercises for 10 minutes including:  Long sitting heel prop x 5'  Seated alternating knee flexion stretch with CL " "Lower Extremity overpressure x 4'    cold pack deferred for home.    Plan for Next Visit:  Assess patients response to the first visit following IE, manual therapy intervention, therapeutic exercises, pt education, and HEP.  Assess alignment, posture, gait, balance, strength/muscle recruitment, flexibility/tone.  Manual therapy to address alignment, mobility, flexibility, tenderness, soft tissue restrictions and/or presence of trigger points.  Add stretching, stabilization, balance, gait, functional mobility and strengthening exercises as appropriate.  Modalities, if indicated.        PATIENT EDUCATION AND HOME EXERCISES     Education provided:   PT provided education and demonstration of HEP to include:   Long sitting alternating quad set with towel roll under knees 5" holds  Long sitting alternating total knee extension with medium bolster, 5" holds  Long sitting alternating total knee extension with small bolster, 5" holds   Long sitting alternating total knee extension with small bolster + mini straight leg raise, 2-3" holds       Pt was instructed to perform these daily with "little bits lots" philosophy.  Pt was given instructions to stop use of HEP if there are any adverse reactions/responses and f/u with PT next treatment to discuss.    Home Exercises Provided: Exercises were reviewed and Shawanda was able to demonstrate them prior to the end of the session.  Shawanda demonstrated fair  understanding of the education provided.   See EMR under Patient Instructions for exercises provided during therapy sessions.    ASSESSMENT     Shawanda is a 77 y.o. female referred to outpatient Physical Therapy with a medical diagnosis of Primary osteoarthritis of left knee [M17.12].   Patient presents s/p Left TKA on 3/13/2023 with previous Right TKA on 8/29/2023.  She presents at time of Initial Evaluation with decreased patella and scar tissue mobility at knees, decreased ROM, decreased strength, Bilateral knee extension " lags with functional weight bearing activities, altered gait, altered balance, decreased overall functional mobility.    Patient prognosis is Good.   Patientt will benefit from skilled outpatient Physical Therapy to address the deficits stated above and in the chart below, provide patient /family education, and to maximize patientt's level of independence.     Plan of care discussed with patient: Yes  Patient's spiritual, cultural and educational needs considered and patient is agreeable to the plan of care and goals as stated below:     Anticipated Barriers for therapy: Schedule conflicts, transportation, pain, guarding, tolerance, endurance, joint and soft tissue restrictions    Medical Necessity is demonstrated by the following  History  Co-morbidities and personal factors that may impact the plan of care Co-morbidities:    Anticoagulant long-term use    Arthritis    Diabetes mellitus    Hyperlipidemia    Hypertension   Back surgery; Breast biopsy (Right); Tracheostomy; robotic arthroplasty, knee (Right, 08/29/2022); and robotic arthroplasty, knee (Left, 3/13/2023).    Personal Factors:   age     high   Examination  Body Structures and Functions, activity limitations and participation restrictions that may impact the plan of care Body Regions:   lower extremities    Body Systems:    ROM  strength  balance  gait  transfers  transitions  motor control    Participation Restrictions:   Schedule conflicts, transportation, pain, guarding, tolerance, endurance    Activity limitations:   Learning and applying knowledge  no deficits    General Tasks and Commands  no deficits    Communication  no deficits    Mobility  Limited with overall functional mobility s/p recent Left TKA and Right TKA 6 months ago    Self care  Limited with overall functional mobility s/p recent Left TKA and Right TKA 6 months ago    Domestic Life  Limited with overall functional mobility s/p recent Left TKA and Right TKA 6 months  ago    Interactions/Relationships  Limited with overall functional mobility s/p recent Left TKA and Right TKA 6 months ago    Life Areas  Limited with overall functional mobility s/p recent Left TKA and Right TKA 6 months ago    Community and Social Life  Limited with overall functional mobility s/p recent Left TKA and Right TKA 6 months ago         moderate   Clinical Presentation evolving clinical presentation with changing clinical characteristics moderate   Decision Making/ Complexity Score: moderate     Goals:    Short-Term: 4 weeks (4/28/2023)  Pt will improve bilateral Active knee extension to > or = -15*  to promote return to prior functional status.  Pt will improve bilateral Active knee flexion to > or = 106* to promote return to prior functional status.  Pt will improve muscle tone/recruitment of hip and Lower Extremity muscles to help promote appropriate progression of PT, HEP, and ADL's    Pt will demonstrate improved gait pattern as evidenced by more upright posture with decreased knee extension lags  Pt will decrease pain levels < or = to 4/10 to help promote appropriate progression of PT, HEP, and ADL's    Pt will be compliant with new home exercise program to assist with PT intervention and help allow appropriate progression through plan of care.    Long-Term: 12 weeks (6/30/2023)  Pt will improve bilateralPassive  knee extension to > or = -10* to allow return to normal activities of daily living.  Pt will improve bilateralPassive  knee flexion to > or = 115* to allow return to normal activities of daily living.  Pt will improve bilateral knee extension strength to > or = 5/5 to allow performance of activities of daily living.  Pt will improve bilateral knee flexion strength to > or = 5/5 to allow performance of activities of daily living.  Pt will improve bilateral hip strength to > or = 4+/5 to allow performance of activities of daily living.  Pt will display overall good gait pattern with mild or  less deviations without assistive device to improve QOL and allow return to prior functional status.  Pt will report < or = 2-3/10 pain during activities of daily living to improve QOL and allow return to prior functional status.   Pt will be compliant and independent with HEP to allow and maintain better participation in normal activities  Pt will be able to resume normals ADL's, IADL's, previous activities    PLAN   Plan of care Certification: 3/29/2023 to 6/30/2023.      Outpatient Physical Therapy 2 times weekly for 12 weeks to include the following interventions: Electrical Stimulation attended/unattended, Gait Training, Manual Therapy, Moist Heat/ Ice, Neuromuscular Re-ed, Orthotic Management and Training, Patient Education, Therapeutic Activities, Therapeutic Exercise, and Ultrasound.  .     Chase Pan, PT      I CERTIFY THE NEED FOR THESE SERVICES FURNISHED UNDER THIS PLAN OF TREATMENT AND WHILE UNDER MY CARE   Physician's comments:     Physician's Signature: ___________________________________________________

## 2023-04-03 NOTE — PROGRESS NOTES
[facility]   Physical Therapy Treatment Note     Name: Shawanda Desir  Clinic Number: [id number]    Therapy Diagnosis:   Encounter Diagnoses   Name Primary?    Acute pain of left knee Yes    Stiffness of left knee     Weakness of both lower extremities     Altered gait      Physician: Matthew Patel MD    Visit Date: 4/4/2023    Physician Orders: PT Eval and Treat   Medical Diagnosis from Referral: Primary osteoarthritis of left knee [M17.12]  Evaluation Date: 3/29/2023  Authorization Period Expiration: 4/26/2023  Plan of Care Expiration: 6/30/2023  Progress Note Due: 4/28/2023  Visit # / Visits authorized: 1 / 11 + eval  FOTO: Issued Visit #: 1/3, (capture on visit 1, 5, 10) first on 3/29/2023     Precautions: Standard, DM, prvious Right TKA, prior lumbar Sx     DOS, procedure:  s/p Left TKA on 3/13/2023    PTA Visit #: 0/5     Time In: 11:30  Time Out: 12:15  Total Billable Time: 40 minutes    SUBJECTIVE     Pt reports: she has some soreness and stiffness in the Left knee.  Mostly anteriorly.      She was compliant with home exercise program.  Response to previous treatment: soreness from new exercises  Functional change: none yet    Pain:  Typically ranges 2-7/10.  Today 6/10  Location: Left knee grossly, mostly anterior     OBJECTIVE     Cold measurement:  Right= -20/-18 to 100/105 (Initial Evaluation was -14/-10 to 100/105)  Left= -15/-13 to 98/103  (Initial Evaluation was  -13/-10 to 97/103)    Warmed up measurement:  Right= -17/-14 to 103/107  Left= -14/-12 to 100/105    Treatment     Shawanda received the treatments listed below:      manual therapy techniques: for 10 minutes, including:  Patella mobs, scar tissue massage, Tibial IR, knee extension stretch/Passive Range of Motion as tolerated.  Release to Bilateral calf and hamstring muscles to help with relaxation to allow for better knee extension.     neuromuscular re-education activities for 15 minutes. The following activities  "were included:  Long sitting alternating total knee extension with medium bolster, 5" holds x 4'  Long sitting alternating total knee extension with small bolster, 5" holds x 4'  Long sitting alternating total knee extension with small bolster + mini SLR x 2'  (VC and TC's needed throughout for VMO recruitment and maintaining throughout straight leg raise)  Long sitting alternating quad set with towel roll under knees 5" holds, x 4' (after heel prop)     therapeutic exercises for 15 minutes including:  Long sitting heel prop x 5'  Seated alternating knee flexion stretch with CL Lower Extremity overpressure x 4'  Shuttle leg press 12.5 lbs x 5' (wants to do 25 lbs next time)     cold pack deferred for home.     Plan for Next Visit:  Assess patients response to thelast visit following  manual therapy intervention, therapeutic exercises, pt education, and HEP.  Assess alignment, posture, gait, balance, strength/muscle recruitment, flexibility/tone.  Manual therapy to address alignment, mobility, flexibility, tenderness, soft tissue restrictions and/or presence of trigger points.  Add stretching, stabilization, balance, gait, functional mobility and strengthening exercises as appropriate.  Modalities, if indicated.      Patient Education and Home Exercises     Home Exercises Provided and Patient Education Provided     Education provided:   PT provided education and demonstration of HEP to include:   Long sitting alternating quad set with towel roll under knees 5" holds  Long sitting alternating total knee extension with medium bolster, 5" holds  Long sitting alternating total knee extension with small bolster, 5" holds   Long sitting alternating total knee extension with small bolster + mini straight leg raise, 2-3" holds         Pt was instructed to perform these daily with "little bits lots" philosophy.  Pt was given instructions to stop use of HEP if there are any adverse reactions/responses and f/u with PT next " treatment to discuss.     Home Exercises Provided: Exercises were reviewed and Shawanda was able to demonstrate them prior to the end of the session.  Shawanda demonstrated fair  understanding of the education provided.   See EMR under Patient Instructions for exercises provided during therapy sessions.    ASSESSMENT   Shawanda is a 77 y.o. female referred to outpatient Physical Therapy with a medical diagnosis of Primary osteoarthritis of left knee [M17.12].   Patient presents s/p Left TKA on 3/13/2023 with previous Right TKA on 8/29/2023.  She presents at time of Initial Evaluation with decreased patella and scar tissue mobility at knees, decreased ROM, decreased strength, Bilateral knee extension lags with functional weight bearing activities, altered gait, altered balance, decreased overall functional mobility.    Today pt returns to PT with some reports of generalized soreness with newer activities and manual therapy to Left knee.  Today was much better with some improved tolerance but also changed the order of some of the exercise to allow her to slowly build into knee extension.  Reported no issues following treatment today.    Shawanda Is progressing well towards her goals.   Pt prognosis is Good.     Pt will continue to benefit from skilled outpatient physical therapy to address the deficits listed in the problem list box on initial evaluation, provide pt/family education and to maximize pt's level of independence in the home and community environment.     Pt's spiritual, cultural and educational needs considered and pt agreeable to plan of care and goals.     Anticipated barriers to physical therapy: Schedule conflicts, transportation, pain, guarding, tolerance, endurance, joint and soft tissue restrictions    Goals:   Short-Term: 4 weeks (4/28/2023)  Pt will improve bilateral Active knee extension to > or = -15*  to promote return to prior functional status.  Pt will improve bilateral Active knee flexion to >  or = 106* to promote return to prior functional status.  Pt will improve muscle tone/recruitment of hip and Lower Extremity muscles to help promote appropriate progression of PT, HEP, and ADL's    Pt will demonstrate improved gait pattern as evidenced by more upright posture with decreased knee extension lags  Pt will decrease pain levels < or = to 4/10 to help promote appropriate progression of PT, HEP, and ADL's    Pt will be compliant with new home exercise program to assist with PT intervention and help allow appropriate progression through plan of care.     Long-Term: 12 weeks (6/30/2023)  Pt will improve bilateralPassive  knee extension to > or = -10* to allow return to normal activities of daily living.  Pt will improve bilateralPassive  knee flexion to > or = 115* to allow return to normal activities of daily living.  Pt will improve bilateral knee extension strength to > or = 5/5 to allow performance of activities of daily living.  Pt will improve bilateral knee flexion strength to > or = 5/5 to allow performance of activities of daily living.  Pt will improve bilateral hip strength to > or = 4+/5 to allow performance of activities of daily living.  Pt will display overall good gait pattern with mild or less deviations without assistive device to improve QOL and allow return to prior functional status.  Pt will report < or = 2-3/10 pain during activities of daily living to improve QOL and allow return to prior functional status.   Pt will be compliant and independent with HEP to allow and maintain better participation in normal activities  Pt will be able to resume normals ADL's, IADL's, previous activities    PLAN     Progress range of motion, flexibility, reduce guarding, strengthening, stabilization, address joint and soft tissue restrictions, balance, gait, functional mobility as able. Modalities to decrease pain as needed. Progress home exercise program as tolerated.     Outpatient Physical Therapy 1-2  times weekly per POC to include the following interventions:  Electrical Stimulation (attended/unattended), massage, dry needling, Gait Training, Manual Therapy, Moist Heat/ Ice, Neuromuscular Re-ed, Patient Education, Therapeutic Activities, Therapeutic Exercise.     Chase Pan, PT

## 2023-04-04 ENCOUNTER — CLINICAL SUPPORT (OUTPATIENT)
Dept: REHABILITATION | Facility: HOSPITAL | Age: 78
End: 2023-04-04
Payer: MEDICARE

## 2023-04-04 DIAGNOSIS — M25.662 STIFFNESS OF LEFT KNEE: ICD-10-CM

## 2023-04-04 DIAGNOSIS — M25.562 ACUTE PAIN OF LEFT KNEE: Primary | ICD-10-CM

## 2023-04-04 DIAGNOSIS — R29.898 WEAKNESS OF BOTH LOWER EXTREMITIES: ICD-10-CM

## 2023-04-04 DIAGNOSIS — R26.9 ALTERED GAIT: ICD-10-CM

## 2023-04-04 PROCEDURE — 97140 MANUAL THERAPY 1/> REGIONS: CPT | Mod: PN

## 2023-04-04 PROCEDURE — 97110 THERAPEUTIC EXERCISES: CPT | Mod: PN

## 2023-04-04 PROCEDURE — 97112 NEUROMUSCULAR REEDUCATION: CPT | Mod: PN

## 2023-04-06 ENCOUNTER — CLINICAL SUPPORT (OUTPATIENT)
Dept: REHABILITATION | Facility: HOSPITAL | Age: 78
End: 2023-04-06
Payer: MEDICARE

## 2023-04-06 DIAGNOSIS — R29.898 WEAKNESS OF BOTH LOWER EXTREMITIES: ICD-10-CM

## 2023-04-06 DIAGNOSIS — M25.662 STIFFNESS OF LEFT KNEE: ICD-10-CM

## 2023-04-06 DIAGNOSIS — R26.9 ALTERED GAIT: ICD-10-CM

## 2023-04-06 DIAGNOSIS — M25.562 ACUTE PAIN OF LEFT KNEE: Primary | ICD-10-CM

## 2023-04-06 PROCEDURE — 97140 MANUAL THERAPY 1/> REGIONS: CPT | Mod: PN,CQ

## 2023-04-06 PROCEDURE — 97112 NEUROMUSCULAR REEDUCATION: CPT | Mod: PN,CQ

## 2023-04-06 PROCEDURE — 97110 THERAPEUTIC EXERCISES: CPT | Mod: PN,CQ

## 2023-04-06 NOTE — PROGRESS NOTES
"[facility]   Physical Therapy Treatment Note     Name: Shawanda Desir  Clinic Number: [id number]    Therapy Diagnosis:   Encounter Diagnoses   Name Primary?    Acute pain of left knee Yes    Stiffness of left knee     Weakness of both lower extremities     Altered gait        Physician: Matthew Patel MD    Visit Date: 4/6/2023    Physician Orders: PT Eval and Treat   Medical Diagnosis from Referral: Primary osteoarthritis of left knee [M17.12]  Evaluation Date: 3/29/2023  Authorization Period Expiration: 4/26/2023  Plan of Care Expiration: 6/30/2023  Progress Note Due: 4/28/2023  Visit # / Visits authorized: 2 / 11 + eval  FOTO: Issued Visit #: 1/3, (capture on visit 1, 5, 10) first on 3/29/2023     Precautions: Standard, DM, prvious Right TKA, prior lumbar Sx     DOS, procedure:  s/p Left TKA on 3/13/2023    PTA Visit #: 1/5     Time In: 1436  Time Out: 1524  Total Billable Time: 48 minutes    SUBJECTIVE     Pt reports: Feeling good today. Walking with right spc. "I don't do too much at home."     She was compliant with home exercise program once a day.  Response to previous treatment: soreness from new exercises  Functional change: none yet    Pain:  Typically ranges 2-7/10.  Today 0/10  Location: Left knee grossly, mostly anterior     OBJECTIVE     Cold measurement:  Right= -18/-14 to 102/105 (Initial Evaluation was -14/-10 to 100/105)  Left= -12/-9 to 102/104 (Initial Evaluation was  -13/-10 to 97/103)    Warmed up measurement:  Right= -17/-14 to 103/106  Left= -9/-6 to 109/111    Treatment     Shawanda received the treatments listed below:      manual therapy techniques: for 10 minutes, including:  Patella mobs, scar tissue massage, Tibial IR, knee extension stretch/Passive Range of Motion as tolerated.  Release to Bilateral calf and hamstring muscles to help with relaxation to allow for better knee extension.     neuromuscular re-education activities for 23 minutes. The following " "activities were included:  Long sitting alternating total knee extension with medium bolster, 5" holds x 4'  Long sitting alternating total knee extension with small bolster, 5" holds x 4'  Long sitting alternating total knee extension with small bolster + mini SLR x 2'  (VC and TC's needed throughout for VMO recruitment and maintaining throughout straight leg raise)  Long sitting alternating quad set with towel roll under knees 5" holds, x 4' (after heel prop)  Long sitting TKE with towel under knee in heel prop x 2' each     therapeutic exercises for 15 minutes including:  Long sitting heel prop x 5'  Seated alternating knee flexion stretch with CL Lower Extremity overpressure x 4'-foot on floor today  Shuttle leg press 12.5 lbs x 5' (wants to do 25 lbs next time)     cold pack deferred for home.     Plan for Next Visit:  Assess patients response to thelast visit following  manual therapy intervention, therapeutic exercises, pt education, and HEP.  Assess alignment, posture, gait, balance, strength/muscle recruitment, flexibility/tone.  Manual therapy to address alignment, mobility, flexibility, tenderness, soft tissue restrictions and/or presence of trigger points.  Add stretching, stabilization, balance, gait, functional mobility and strengthening exercises as appropriate.  Modalities, if indicated.      Patient Education and Home Exercises     Home Exercises Provided and Patient Education Provided     Education provided:   PT provided education and demonstration of HEP to include:   Long sitting alternating quad set with towel roll under knees 5" holds  Long sitting alternating total knee extension with medium bolster, 5" holds  Long sitting alternating total knee extension with small bolster, 5" holds   Long sitting alternating total knee extension with small bolster + mini straight leg raise, 2-3" holds         Pt was instructed to perform these daily with "little bits lots" philosophy.  Pt was given " instructions to stop use of HEP if there are any adverse reactions/responses and f/u with PT next treatment to discuss.     Home Exercises Provided: Instructed patient to continue current home exercise program.    Exercises were reviewed and Shawanda was able to demonstrate them prior to the end of the session.  Shawanda demonstrated fair  understanding of the education provided.   See EMR under Patient Instructions for exercises provided during therapy sessions.    ASSESSMENT   Shawanda is a 77 y.o. female referred to outpatient Physical Therapy with a medical diagnosis of Primary osteoarthritis of left knee [M17.12]. improved pain since last session. Improved ROM cold and warm measurements. Tactile cues for increased quadriceps activation with exercises. Good session without pain provocation and improved ROM. Increased resistance on shuttle today to 25# and pt asked afterward that she may increase resistance next session.     Shawanda Is progressing well towards her goals.   Pt prognosis is Good.     Pt will continue to benefit from skilled outpatient physical therapy to address the deficits listed in the problem list box on initial evaluation, provide pt/family education and to maximize pt's level of independence in the home and community environment.     Pt's spiritual, cultural and educational needs considered and pt agreeable to plan of care and goals.     Anticipated barriers to physical therapy: Schedule conflicts, transportation, pain, guarding, tolerance, endurance, joint and soft tissue restrictions    Goals:   Short-Term: 4 weeks (4/28/2023)  Pt will improve bilateral Active knee extension to > or = -15*  to promote return to prior functional status.  Pt will improve bilateral Active knee flexion to > or = 106* to promote return to prior functional status.  Pt will improve muscle tone/recruitment of hip and Lower Extremity muscles to help promote appropriate progression of PT, HEP, and ADL's    Pt will  demonstrate improved gait pattern as evidenced by more upright posture with decreased knee extension lags  Pt will decrease pain levels < or = to 4/10 to help promote appropriate progression of PT, HEP, and ADL's    Pt will be compliant with new home exercise program to assist with PT intervention and help allow appropriate progression through plan of care.     Long-Term: 12 weeks (6/30/2023)  Pt will improve bilateralPassive  knee extension to > or = -10* to allow return to normal activities of daily living.  Pt will improve bilateralPassive  knee flexion to > or = 115* to allow return to normal activities of daily living.  Pt will improve bilateral knee extension strength to > or = 5/5 to allow performance of activities of daily living.  Pt will improve bilateral knee flexion strength to > or = 5/5 to allow performance of activities of daily living.  Pt will improve bilateral hip strength to > or = 4+/5 to allow performance of activities of daily living.  Pt will display overall good gait pattern with mild or less deviations without assistive device to improve QOL and allow return to prior functional status.  Pt will report < or = 2-3/10 pain during activities of daily living to improve QOL and allow return to prior functional status.   Pt will be compliant and independent with HEP to allow and maintain better participation in normal activities  Pt will be able to resume normals ADL's, IADL's, previous activities    PLAN     Progress range of motion, flexibility, reduce guarding, strengthening, stabilization, address joint and soft tissue restrictions, balance, gait, functional mobility as able. Modalities to decrease pain as needed. Progress home exercise program as tolerated.     Outpatient Physical Therapy 1-2 times weekly per POC to include the following interventions:  Electrical Stimulation (attended/unattended), massage, dry needling, Gait Training, Manual Therapy, Moist Heat/ Ice, Neuromuscular Re-ed,  Patient Education, Therapeutic Activities, Therapeutic Exercise.     Alycia Walsh, PTA

## 2023-04-12 ENCOUNTER — CLINICAL SUPPORT (OUTPATIENT)
Dept: REHABILITATION | Facility: HOSPITAL | Age: 78
End: 2023-04-12
Payer: MEDICARE

## 2023-04-12 DIAGNOSIS — R29.898 WEAKNESS OF BOTH LOWER EXTREMITIES: ICD-10-CM

## 2023-04-12 DIAGNOSIS — R26.9 ALTERED GAIT: ICD-10-CM

## 2023-04-12 DIAGNOSIS — M25.662 STIFFNESS OF LEFT KNEE: ICD-10-CM

## 2023-04-12 DIAGNOSIS — M25.562 ACUTE PAIN OF LEFT KNEE: Primary | ICD-10-CM

## 2023-04-12 PROCEDURE — 97110 THERAPEUTIC EXERCISES: CPT | Mod: PN,CQ

## 2023-04-12 PROCEDURE — 97112 NEUROMUSCULAR REEDUCATION: CPT | Mod: PN,CQ

## 2023-04-12 PROCEDURE — 97140 MANUAL THERAPY 1/> REGIONS: CPT | Mod: PN,CQ

## 2023-04-12 NOTE — PROGRESS NOTES
[facility]   Physical Therapy Treatment Note     Name: Shawanda Desir  Clinic Number: [id number]    Therapy Diagnosis:   Encounter Diagnoses   Name Primary?    Acute pain of left knee Yes    Stiffness of left knee     Weakness of both lower extremities     Altered gait        Physician: Matthew Patel MD    Visit Date: 4/12/2023    Physician Orders: PT Eval and Treat   Medical Diagnosis from Referral: Primary osteoarthritis of left knee [M17.12]  Evaluation Date: 3/29/2023  Authorization Period Expiration: 4/26/2023  Plan of Care Expiration: 6/30/2023  Progress Note Due: 4/28/2023  Visit # / Visits authorized: 3 / 11 + eval  FOTO: Issued Visit #: 1/3, (capture on visit 1, 5, 10) first on 3/29/2023     Precautions: Standard, DM, prvious Right TKA, prior lumbar Sx     DOS, procedure:  s/p Left TKA on 3/13/2023    PTA Visit #: 1/5     Time In: 1125  Time Out: 1225  Total Billable Time: 60 minutes    SUBJECTIVE     Pt reports: feeling good today but did have some knee pain along with bilateral lower back pain [place] Walking with spc.     She was compliant with home exercise program once a day.  Response to previous treatment: soreness from new exercises  Functional change: can tie her shoes now.     Pain:  Typically ranges 2-7/10.  Today 0/10  Location: Left knee grossly, mostly anterior     OBJECTIVE     Cold measurement:  Right= -12/-9 to 103/105 (Initial Evaluation was -14/-10 to 100/105)  Left= -14/-12 to 100/103 (Initial Evaluation was  -13/-10 to 97/103)    Warmed up measurement:  Right= -9/-7 to 103/108  Left= -9/-6 to 109/111    Treatment     Shawanda received the treatments listed below:      manual therapy techniques: for 20 minutes, including:  Patella mobs, scar tissue massage, Tibial IR, knee extension stretch/Passive Range of Motion as tolerated.  Release to Bilateral calf and hamstring muscles to help with relaxation  "to allow for better knee extension.     neuromuscular re-education activities for 25 minutes. The following activities were included:  Long sitting alternating total knee extension with medium bolster, 5" holds x 4'  Long sitting alternating total knee extension with small bolster, 5" holds x 4'  Long sitting alternating total knee extension with small bolster + mini SLR x 2'  (VC and TC's needed throughout for VMO recruitment and maintaining throughout straight leg raise)-no bolster today  Long sitting alternating quad set with towel roll under knees 5" holds, x 4' (after heel prop)  Long sitting TKE with towel under knee in heel prop x 2' each  Long sitting TKE with medium bolster under ankles and small bolster under knees x 4'     therapeutic exercises for 15 minutes including:  Long sitting heel prop x 5'  Seated alternating knee flexion stretch with CL Lower Extremity overpressure x 4'-foot on floor today  Shuttle leg press 25 lbs x 5'      cold pack deferred for home.     Plan for Next Visit:  Assess patients response to thelast visit following  manual therapy intervention, therapeutic exercises, pt education, and HEP.  Assess alignment, posture, gait, balance, strength/muscle recruitment, flexibility/tone.  Manual therapy to address alignment, mobility, flexibility, tenderness, soft tissue restrictions and/or presence of trigger points.  Add stretching, stabilization, balance, gait, functional mobility and strengthening exercises as appropriate.  Modalities, if indicated.      Patient Education and Home Exercises     Home Exercises Provided and Patient Education Provided     Education provided:   PT provided education and demonstration of HEP to include:   Long sitting alternating quad set with towel roll under knees 5" holds  Long sitting alternating total knee extension with medium bolster, 5" holds  Long sitting alternating total knee extension with small bolster, 5" holds   Long sitting alternating total " "knee extension with small bolster + mini straight leg raise, 2-3" holds         Pt was instructed to perform these daily with "little bits lots" philosophy.  Pt was given instructions to stop use of HEP if there are any adverse reactions/responses and f/u with PT next treatment to discuss.     Home Exercises Provided: Instructed patient to continue current home exercise program.    Exercises were reviewed and Shawanda was able to demonstrate them prior to the end of the session.  Shawanda demonstrated fair  understanding of the education provided.   See EMR under Patient Instructions for exercises provided during therapy sessions.    ASSESSMENT   Shawanda is a 77 y.o. female referred to outpatient Physical Therapy with a medical diagnosis of Primary osteoarthritis of left knee [M17.12]. improved pain today after weekend of discomfort after sleeping on hard mattress. Walking with spc. Progressed strengthening and ROM with good tolerance. Increasing ROM bilateral knees. Cues for increased TKE with gait during heelstrike to mid stance.     Shawanda Is progressing well towards her goals.   Pt prognosis is Good.     Pt will continue to benefit from skilled outpatient physical therapy to address the deficits listed in the problem list box on initial evaluation, provide pt/family education and to maximize pt's level of independence in the home and community environment.     Pt's spiritual, cultural and educational needs considered and pt agreeable to plan of care and goals.     Anticipated barriers to physical therapy: Schedule conflicts, transportation, pain, guarding, tolerance, endurance, joint and soft tissue restrictions    Goals:   Short-Term: 4 weeks (4/28/2023)  Pt will improve bilateral Active knee extension to > or = -15*  to promote return to prior functional status.  Pt will improve bilateral Active knee flexion to > or = 106* to promote return to prior functional status.  Pt will improve muscle tone/recruitment of " hip and Lower Extremity muscles to help promote appropriate progression of PT, HEP, and ADL's    Pt will demonstrate improved gait pattern as evidenced by more upright posture with decreased knee extension lags  Pt will decrease pain levels < or = to 4/10 to help promote appropriate progression of PT, HEP, and ADL's    Pt will be compliant with new home exercise program to assist with PT intervention and help allow appropriate progression through plan of care.     Long-Term: 12 weeks (6/30/2023)  Pt will improve bilateralPassive  knee extension to > or = -10* to allow return to normal activities of daily living.  Pt will improve bilateralPassive  knee flexion to > or = 115* to allow return to normal activities of daily living.  Pt will improve bilateral knee extension strength to > or = 5/5 to allow performance of activities of daily living.  Pt will improve bilateral knee flexion strength to > or = 5/5 to allow performance of activities of daily living.  Pt will improve bilateral hip strength to > or = 4+/5 to allow performance of activities of daily living.  Pt will display overall good gait pattern with mild or less deviations without assistive device to improve QOL and allow return to prior functional status.  Pt will report < or = 2-3/10 pain during activities of daily living to improve QOL and allow return to prior functional status.   Pt will be compliant and independent with HEP to allow and maintain better participation in normal activities  Pt will be able to resume normals ADL's, IADL's, previous activities    PLAN     Progress range of motion, flexibility, reduce guarding, strengthening, stabilization, address joint and soft tissue restrictions, balance, gait, functional mobility as able. Modalities to decrease pain as needed. Progress home exercise program as tolerated.     Outpatient Physical Therapy 1-2 times weekly per POC to include the following interventions:  Electrical Stimulation  (attended/unattended), massage, dry needling, Gait Training, Manual Therapy, Moist Heat/ Ice, Neuromuscular Re-ed, Patient Education, Therapeutic Activities, Therapeutic Exercise.     Alycia Walsh, PTA

## 2023-04-12 NOTE — PROGRESS NOTES
"[facility]   Physical Therapy Treatment Note     Name: Shawanda Desir  Clinic Number: [id number]    Therapy Diagnosis:   No diagnosis found.      Physician: Matthew Patel MD    Visit Date: 4/12/2023    Physician Orders: PT Eval and Treat   Medical Diagnosis from Referral: Primary osteoarthritis of left knee [M17.12]  Evaluation Date: 3/29/2023  Authorization Period Expiration: 4/26/2023  Plan of Care Expiration: 6/30/2023  Progress Note Due: 4/28/2023  Visit # / Visits authorized: 3 / 11 + eval  FOTO: Issued Visit #: 1/3, (capture on visit 1, 5, 10) first on 3/29/2023     Precautions: Standard, DM, prvious Right TKA, prior lumbar Sx     DOS, procedure:  s/p Left TKA on 3/13/2023    PTA Visit #: 2/5     Time In: ***  Time Out: ***  Total Billable Time: 48 minutes    SUBJECTIVE     Pt reports: *** Feeling good today. Walking with right spc. "I don't do too much at home."     She was compliant with home exercise program once a day.  Response to previous treatment: soreness from new exercises  Functional change: none yet    Pain:  Typically ranges 2-7/10.  Today 0/10  Location: Left knee grossly, mostly anterior     OBJECTIVE     Cold measurement:  Right= -18/-14 to 102/105 (Initial Evaluation was -14/-10 to 100/105)  Left= -12/-9 to 102/104 (Initial Evaluation was  -13/-10 to 97/103)    Warmed up measurement:  Right= -17/-14 to 103/106  Left= -9/-6 to 109/111    Treatment     Shawanda received the treatments listed below:      manual therapy techniques: for 10 minutes, including:  Patella mobs, scar tissue massage, Tibial IR, knee extension stretch/Passive Range of Motion as tolerated.  Release to Bilateral calf and hamstring muscles to help with relaxation to allow for better knee extension.     neuromuscular re-education activities for 23 minutes. The following activities were included:  Long sitting alternating total knee extension with medium bolster, 5" holds x 4'  Long sitting " "alternating total knee extension with small bolster, 5" holds x 4'  Long sitting alternating total knee extension with small bolster + mini SLR x 2'  (VC and TC's needed throughout for VMO recruitment and maintaining throughout straight leg raise)  Long sitting alternating quad set with towel roll under knees 5" holds, x 4' (after heel prop)  Long sitting TKE with towel under knee in heel prop x 2' each     therapeutic exercises for 15 minutes including:  Long sitting heel prop x 5'  Seated alternating knee flexion stretch with CL Lower Extremity overpressure x 4'-foot on floor today  Shuttle leg press 37 lbs x 5'     cold pack deferred for home.     Plan for Next Visit:  Assess patients response to thelast visit following  manual therapy intervention, therapeutic exercises, pt education, and HEP.  Assess alignment, posture, gait, balance, strength/muscle recruitment, flexibility/tone.  Manual therapy to address alignment, mobility, flexibility, tenderness, soft tissue restrictions and/or presence of trigger points.  Add stretching, stabilization, balance, gait, functional mobility and strengthening exercises as appropriate.  Modalities, if indicated.      Patient Education and Home Exercises     Home Exercises Provided and Patient Education Provided     Education provided:   PT provided education and demonstration of HEP to include:   Long sitting alternating quad set with towel roll under knees 5" holds  Long sitting alternating total knee extension with medium bolster, 5" holds  Long sitting alternating total knee extension with small bolster, 5" holds   Long sitting alternating total knee extension with small bolster + mini straight leg raise, 2-3" holds         Pt was instructed to perform these daily with "little bits lots" philosophy.  Pt was given instructions to stop use of HEP if there are any adverse reactions/responses and f/u with PT next treatment to discuss.     Home Exercises Provided: Instructed " patient to continue current home exercise program.    Exercises were reviewed and Shawanda was able to demonstrate them prior to the end of the session.  Shawanda demonstrated fair  understanding of the education provided.   See EMR under Patient Instructions for exercises provided during therapy sessions.    ASSESSMENT   Shawanda *** is a 77 y.o. female referred to outpatient Physical Therapy with a medical diagnosis of Primary osteoarthritis of left knee [M17.12]. improved pain since last session. Improved ROM cold and warm measurements. Tactile cues for increased quadriceps activation with exercises. Good session without pain provocation and improved ROM. Increased resistance on shuttle today to 25# and pt asked afterward that she may increase resistance next session.     Shawanda Is progressing well towards her goals.   Pt prognosis is Good.     Pt will continue to benefit from skilled outpatient physical therapy to address the deficits listed in the problem list box on initial evaluation, provide pt/family education and to maximize pt's level of independence in the home and community environment.     Pt's spiritual, cultural and educational needs considered and pt agreeable to plan of care and goals.     Anticipated barriers to physical therapy: Schedule conflicts, transportation, pain, guarding, tolerance, endurance, joint and soft tissue restrictions    Goals:   Short-Term: 4 weeks (4/28/2023)  Pt will improve bilateral Active knee extension to > or = -15*  to promote return to prior functional status.  Pt will improve bilateral Active knee flexion to > or = 106* to promote return to prior functional status.  Pt will improve muscle tone/recruitment of hip and Lower Extremity muscles to help promote appropriate progression of PT, HEP, and ADL's    Pt will demonstrate improved gait pattern as evidenced by more upright posture with decreased knee extension lags  Pt will decrease pain levels < or = to 4/10 to help  promote appropriate progression of PT, HEP, and ADL's    Pt will be compliant with new home exercise program to assist with PT intervention and help allow appropriate progression through plan of care.     Long-Term: 12 weeks (6/30/2023)  Pt will improve bilateralPassive  knee extension to > or = -10* to allow return to normal activities of daily living.  Pt will improve bilateralPassive  knee flexion to > or = 115* to allow return to normal activities of daily living.  Pt will improve bilateral knee extension strength to > or = 5/5 to allow performance of activities of daily living.  Pt will improve bilateral knee flexion strength to > or = 5/5 to allow performance of activities of daily living.  Pt will improve bilateral hip strength to > or = 4+/5 to allow performance of activities of daily living.  Pt will display overall good gait pattern with mild or less deviations without assistive device to improve QOL and allow return to prior functional status.  Pt will report < or = 2-3/10 pain during activities of daily living to improve QOL and allow return to prior functional status.   Pt will be compliant and independent with HEP to allow and maintain better participation in normal activities  Pt will be able to resume normals ADL's, IADL's, previous activities    PLAN     Progress range of motion, flexibility, reduce guarding, strengthening, stabilization, address joint and soft tissue restrictions, balance, gait, functional mobility as able. Modalities to decrease pain as needed. Progress home exercise program as tolerated.     Outpatient Physical Therapy 1-2 times weekly per POC to include the following interventions:  Electrical Stimulation (attended/unattended), massage, dry needling, Gait Training, Manual Therapy, Moist Heat/ Ice, Neuromuscular Re-ed, Patient Education, Therapeutic Activities, Therapeutic Exercise.     Jannet Hwang, PTA

## 2023-04-13 NOTE — PROGRESS NOTES
[facility]   Physical Therapy Treatment Note     Name: Shawanda Desir  Clinic Number: [id number]    Therapy Diagnosis:   Encounter Diagnoses   Name Primary?    Acute pain of left knee Yes    Stiffness of left knee     Weakness of both lower extremities     Altered gait      Physician: Matthew Patel MD    Visit Date: 4/14/2023    Physician Orders: PT Eval and Treat   Medical Diagnosis from Referral: Primary osteoarthritis of left knee [M17.12]  Evaluation Date: 3/29/2023  Authorization Period Expiration: 4/26/2023  Plan of Care Expiration: 6/30/2023  Progress Note Due: 4/28/2023  Visit # / Visits authorized: 4 / 11 + eval  FOTO: Issued Visit #: 1/3, (capture on visit 1, 5, 10) first on 3/29/2023     Precautions: Standard, DM, prvious Right TKA, prior lumbar Sx     DOS, procedure:  s/p Left TKA on 3/13/2023    PTA Visit #: 0/5     Time In: 10:45  Time Out: 11:40  Total Billable Time: 43 minutes    SUBJECTIVE     Pt reports: Left knee bothering her some.  Did not sleep too well last night.   Today she felt like she progressively loosened up with PT and the ice at the end felt good.  She did admit that she might not be sleeping well at night partially because she is napping during the day.    She was compliant with home exercise program once a day.  Response to previous treatment: soreness from new exercises  Functional change: can tie her shoes now.     Pain:  Typically ranges 2-7/10.  Today 3/10 better by the end of treatment  Location: Left knee grossly, mostly anterior     OBJECTIVE     Cold measurement:  Right= -12/-9 to 103/105 (Initial Evaluation was -14/-10 to 100/105)  Left= -14/-12 to 100/103 (Initial Evaluation was  -13/-10 to 97/103)    Warmed up measurement:  Right= -9/-7 to 103/108  Left= -9/-6 to 109/111    Treatment     Shawanda received the treatments listed below:      manual therapy techniques: for 10 minutes, including:  Patella mobs, scar tissue massage, Tibial IR, knee  "extension stretch/Passive Range of Motion as tolerated.  Release to Bilateral calf and hamstring muscles to help with relaxation to allow for better knee extension.     neuromuscular re-education activities for 23 minutes. The following activities were included:  Long sitting alternating total knee extension with medium bolster, 5" holds x 4', 1 lb  Long sitting alternating total knee extension with small bolster, 5" holds x 4', 1 lb  Long sitting alternating total knee extension with small bolster + mini SLR x 2'  (VC and TC's needed throughout for VMO recruitment and maintaining throughout straight leg raise)  Long sitting alternating quad set with towel roll under knees, small roll under ankles  5" holds, x 4'   Seated alternating long arc quad x 4', 3-5" holds, 1 lb (cues for VMO recruitment at EROM)     therapeutic exercises for 10 minutes including:  Long sitting heel prop x 5'--np today (deferred)  Seated alternating knee flexion stretch with CL Lower Extremity overpressure x 4'-foot on floor today  Shuttle leg press 25 lbs x 5'      cold pack Left knee x 10'.     Plan for Next Visit:  Assess patients response to thelast visit following  manual therapy intervention, therapeutic exercises, pt education, and HEP.  Assess alignment, posture, gait, balance, strength/muscle recruitment, flexibility/tone.  Manual therapy to address alignment, mobility, flexibility, tenderness, soft tissue restrictions and/or presence of trigger points.  Add stretching, stabilization, balance, gait, functional mobility and strengthening exercises as appropriate.  Modalities, if indicated.      Patient Education and Home Exercises     Home Exercises Provided and Patient Education Provided     Education provided:   PT provided education and demonstration of HEP to include:   Long sitting alternating quad set with towel roll under knees 5" holds  Long sitting alternating total knee extension with medium bolster, 5" holds  Long sitting " "alternating total knee extension with small bolster, 5" holds   Long sitting alternating total knee extension with small bolster + mini straight leg raise, 2-3" holds         Pt was instructed to perform these daily with "little bits lots" philosophy.  Pt was given instructions to stop use of HEP if there are any adverse reactions/responses and f/u with PT next treatment to discuss.     Home Exercises Provided: Instructed patient to continue current home exercise program.    Exercises were reviewed and Shawanda was able to demonstrate them prior to the end of the session.  Shawanda demonstrated fair  understanding of the education provided.   See EMR under Patient Instructions for exercises provided during therapy sessions.    ASSESSMENT   Shawanda is a 77 y.o. female referred to outpatient Physical Therapy with a medical diagnosis of Primary osteoarthritis of left knee [M17.12].     Pt presenting with increased c/o Left knee pain since last night that seemed to progressively loosen up with exercising with some manual therapy to the knee as tolerated.  Finished with ice today.       Shawanda Is progressing well towards her goals.   Pt prognosis is Good.     Pt will continue to benefit from skilled outpatient physical therapy to address the deficits listed in the problem list box on initial evaluation, provide pt/family education and to maximize pt's level of independence in the home and community environment.     Pt's spiritual, cultural and educational needs considered and pt agreeable to plan of care and goals.     Anticipated barriers to physical therapy: Schedule conflicts, transportation, pain, guarding, tolerance, endurance, joint and soft tissue restrictions    Goals:   Short-Term: 4 weeks (4/28/2023)  Pt will improve bilateral Active knee extension to > or = -15*  to promote return to prior functional status.  Pt will improve bilateral Active knee flexion to > or = 106* to promote return to prior functional " status.  Pt will improve muscle tone/recruitment of hip and Lower Extremity muscles to help promote appropriate progression of PT, HEP, and ADL's    Pt will demonstrate improved gait pattern as evidenced by more upright posture with decreased knee extension lags  Pt will decrease pain levels < or = to 4/10 to help promote appropriate progression of PT, HEP, and ADL's    Pt will be compliant with new home exercise program to assist with PT intervention and help allow appropriate progression through plan of care.     Long-Term: 12 weeks (6/30/2023)  Pt will improve bilateralPassive  knee extension to > or = -10* to allow return to normal activities of daily living.  Pt will improve bilateralPassive  knee flexion to > or = 115* to allow return to normal activities of daily living.  Pt will improve bilateral knee extension strength to > or = 5/5 to allow performance of activities of daily living.  Pt will improve bilateral knee flexion strength to > or = 5/5 to allow performance of activities of daily living.  Pt will improve bilateral hip strength to > or = 4+/5 to allow performance of activities of daily living.  Pt will display overall good gait pattern with mild or less deviations without assistive device to improve QOL and allow return to prior functional status.  Pt will report < or = 2-3/10 pain during activities of daily living to improve QOL and allow return to prior functional status.   Pt will be compliant and independent with HEP to allow and maintain better participation in normal activities  Pt will be able to resume normals ADL's, IADL's, previous activities    PLAN     Progress range of motion, flexibility, reduce guarding, strengthening, stabilization, address joint and soft tissue restrictions, balance, gait, functional mobility as able. Modalities to decrease pain as needed. Progress home exercise program as tolerated.     Outpatient Physical Therapy 1-2 times weekly per POC to include the following  interventions:  Electrical Stimulation (attended/unattended), massage, dry needling, Gait Training, Manual Therapy, Moist Heat/ Ice, Neuromuscular Re-ed, Patient Education, Therapeutic Activities, Therapeutic Exercise.     Chase Pan, PT

## 2023-04-14 ENCOUNTER — CLINICAL SUPPORT (OUTPATIENT)
Dept: REHABILITATION | Facility: HOSPITAL | Age: 78
End: 2023-04-14
Payer: MEDICARE

## 2023-04-14 DIAGNOSIS — R26.9 ALTERED GAIT: ICD-10-CM

## 2023-04-14 DIAGNOSIS — M25.562 ACUTE PAIN OF LEFT KNEE: Primary | ICD-10-CM

## 2023-04-14 DIAGNOSIS — M25.662 STIFFNESS OF LEFT KNEE: ICD-10-CM

## 2023-04-14 DIAGNOSIS — R29.898 WEAKNESS OF BOTH LOWER EXTREMITIES: ICD-10-CM

## 2023-04-14 PROCEDURE — 97010 HOT OR COLD PACKS THERAPY: CPT | Mod: PN

## 2023-04-14 PROCEDURE — 97112 NEUROMUSCULAR REEDUCATION: CPT | Mod: PN

## 2023-04-14 PROCEDURE — 97140 MANUAL THERAPY 1/> REGIONS: CPT | Mod: PN

## 2023-04-14 PROCEDURE — 97110 THERAPEUTIC EXERCISES: CPT | Mod: PN

## 2023-04-14 NOTE — PROGRESS NOTES
[facility]   Physical Therapy Treatment Note     Name: Shawanda Desir  Clinic Number: [id number]    Therapy Diagnosis:   Encounter Diagnoses   Name Primary?    Acute pain of left knee Yes    Stiffness of left knee     Weakness of both lower extremities     Altered gait      Physician: Matthew Patel MD    Visit Date: 4/17/2023    Physician Orders: PT Eval and Treat   Medical Diagnosis from Referral: Primary osteoarthritis of left knee [M17.12]  Evaluation Date: 3/29/2023  Authorization Period Expiration: 4/26/2023  Plan of Care Expiration: 6/30/2023  Progress Note Due: 4/28/2023  Visit # / Visits authorized: 5 / 11 + eval  FOTO: Issued Visit #: 1/3, (capture on visit 1, 5, 10) first on 3/29/2023     Precautions: Standard, DM, prvious Right TKA, prior lumbar Sx     DOS, procedure:  s/p Left TKA on 3/13/2023    PTA Visit #: 0/5     Time In: 11:30  Time Out: 12:25  Total Billable Time: 43 minutes    SUBJECTIVE     Pt reports: she felt good after the last visit.  Had a very good weekend and especially had a good day yesterday.  [place].  Stopped taking the Tramadol because she felt like she was having bad reaction to them, taking Tylenol now as of this morning.    She was compliant with home exercise program once a day.  Response to previous treatment:  did very well with the exercises progression  Functional change: can tie her shoes now, got out of the house more.  Walking at home without SPC    Pain:  Typically ranges 2-7/10.  Today 2-3/10 better by the end of treatment  Location: Left knee grossly, mostly anterior     OBJECTIVE     Cold measurement:  Right= -12/-9 to 103/105 (Initial Evaluation was -14/-10 to 100/105)  Left= -14/-12 to 100/103 (Initial Evaluation was  -13/-10 to 97/103)    Warmed up measurement:  Right= -9/-7 to 103/108  Left= -9/-6 to 109/111    Treatment     Shawanda received the treatments listed  "below:      manual therapy techniques: for 5 minutes, including:  Patella mobs, scar tissue massage, Tibial IR, knee extension stretch/Passive Range of Motion as tolerated.  Release to Bilateral calf and hamstring muscles to help with relaxation to allow for better knee extension.     neuromuscular re-education activities for 23 minutes. The following activities were included:  Long sitting alternating total knee extension with medium bolster, 5" holds x 4', 1 lb  Long sitting alternating total knee extension with small bolster, 5" holds x 4', 1 lb  Long sitting alternating total knee extension with small bolster + mini SLR x 3'  (VC and TC's needed throughout for VMO recruitment and maintaining throughout straight leg raise)  Long sitting alternating quad set with long 1/2 roll under knees, small roll under ankles  5" holds, x 4'   Seated alternating long arc quad x 4', 3-5" holds, 1 lb (cues for VMO recruitment at EROM)     therapeutic exercises for 15 minutes including:  Long sitting heel prop x 5'--np today (deferred)  Seated alternating knee flexion stretch with CL Lower Extremity overpressure x 4'  Shuttle leg press 25 lbs x 5'   Recumbent bike seat 9, retro x 1.5' then forward x 1.5' for total of 3' today for ROM, strength, endurance     cold pack Left knee x 10'.     Plan for Next Visit:  Assess patients response to thelast visit following  manual therapy intervention, therapeutic exercises, pt education, and HEP.  Assess alignment, posture, gait, balance, strength/muscle recruitment, flexibility/tone.  Manual therapy to address alignment, mobility, flexibility, tenderness, soft tissue restrictions and/or presence of trigger points.  Add stretching, stabilization, balance, gait, functional mobility and strengthening exercises as appropriate.  Modalities, if indicated.      Patient Education and Home Exercises     Home Exercises Provided and Patient Education Provided     Education provided:   PT provided " "education and demonstration of HEP to include:   Long sitting alternating quad set with towel roll under knees 5" holds  Long sitting alternating total knee extension with medium bolster, 5" holds  Long sitting alternating total knee extension with small bolster, 5" holds   Long sitting alternating total knee extension with small bolster + mini straight leg raise, 2-3" holds         Pt was instructed to perform these daily with "little bits lots" philosophy.  Pt was given instructions to stop use of HEP if there are any adverse reactions/responses and f/u with PT next treatment to discuss.     Home Exercises Provided: Instructed patient to continue current home exercise program.    Exercises were reviewed and Shawanda was able to demonstrate them prior to the end of the session.  Shawanda demonstrated fair  understanding of the education provided.   See EMR under Patient Instructions for exercises provided during therapy sessions.    ASSESSMENT   Shawanda is a 77 y.o. female referred to outpatient Physical Therapy with a medical diagnosis of Primary osteoarthritis of left knee [M17.12].     Pt presenting to PT today reporting she had a very good weekend.  Last visit we progressed into knee extension progressively with exercises and then slowly progressed into knee flexion as tolerated.  Finished with ice again.  Limited tolerance/endurance on the bike today as she only did 3 minutes.       Shawanda Is progressing well towards her goals.   Pt prognosis is Good.     Pt will continue to benefit from skilled outpatient physical therapy to address the deficits listed in the problem list box on initial evaluation, provide pt/family education and to maximize pt's level of independence in the home and community environment.     Pt's spiritual, cultural and educational needs considered and pt agreeable to plan of care and goals.     Anticipated barriers to physical therapy: Schedule conflicts, transportation, pain, guarding, " tolerance, endurance, joint and soft tissue restrictions    Goals:   Short-Term: 4 weeks (4/28/2023)  Pt will improve bilateral Active knee extension to > or = -15*  to promote return to prior functional status.  Pt will improve bilateral Active knee flexion to > or = 106* to promote return to prior functional status.  Pt will improve muscle tone/recruitment of hip and Lower Extremity muscles to help promote appropriate progression of PT, HEP, and ADL's    Pt will demonstrate improved gait pattern as evidenced by more upright posture with decreased knee extension lags  Pt will decrease pain levels < or = to 4/10 to help promote appropriate progression of PT, HEP, and ADL's    Pt will be compliant with new home exercise program to assist with PT intervention and help allow appropriate progression through plan of care.     Long-Term: 12 weeks (6/30/2023)  Pt will improve bilateralPassive  knee extension to > or = -10* to allow return to normal activities of daily living.  Pt will improve bilateralPassive  knee flexion to > or = 115* to allow return to normal activities of daily living.  Pt will improve bilateral knee extension strength to > or = 5/5 to allow performance of activities of daily living.  Pt will improve bilateral knee flexion strength to > or = 5/5 to allow performance of activities of daily living.  Pt will improve bilateral hip strength to > or = 4+/5 to allow performance of activities of daily living.  Pt will display overall good gait pattern with mild or less deviations without assistive device to improve QOL and allow return to prior functional status.  Pt will report < or = 2-3/10 pain during activities of daily living to improve QOL and allow return to prior functional status.   Pt will be compliant and independent with HEP to allow and maintain better participation in normal activities  Pt will be able to resume normals ADL's, IADL's, previous activities    PLAN     Progress range of motion,  flexibility, reduce guarding, strengthening, stabilization, address joint and soft tissue restrictions, balance, gait, functional mobility as able. Modalities to decrease pain as needed. Progress home exercise program as tolerated.     Outpatient Physical Therapy 1-2 times weekly per POC to include the following interventions:  Electrical Stimulation (attended/unattended), massage, dry needling, Gait Training, Manual Therapy, Moist Heat/ Ice, Neuromuscular Re-ed, Patient Education, Therapeutic Activities, Therapeutic Exercise.     Chase Pan, PT

## 2023-04-17 ENCOUNTER — CLINICAL SUPPORT (OUTPATIENT)
Dept: REHABILITATION | Facility: HOSPITAL | Age: 78
End: 2023-04-17
Payer: MEDICARE

## 2023-04-17 DIAGNOSIS — R26.9 ALTERED GAIT: ICD-10-CM

## 2023-04-17 DIAGNOSIS — M25.562 ACUTE PAIN OF LEFT KNEE: Primary | ICD-10-CM

## 2023-04-17 DIAGNOSIS — M25.662 STIFFNESS OF LEFT KNEE: ICD-10-CM

## 2023-04-17 DIAGNOSIS — R29.898 WEAKNESS OF BOTH LOWER EXTREMITIES: ICD-10-CM

## 2023-04-17 PROCEDURE — 97112 NEUROMUSCULAR REEDUCATION: CPT | Mod: PN

## 2023-04-17 PROCEDURE — 97010 HOT OR COLD PACKS THERAPY: CPT | Mod: PN

## 2023-04-17 PROCEDURE — 97110 THERAPEUTIC EXERCISES: CPT | Mod: PN

## 2023-04-19 ENCOUNTER — CLINICAL SUPPORT (OUTPATIENT)
Dept: REHABILITATION | Facility: HOSPITAL | Age: 78
End: 2023-04-19
Payer: MEDICARE

## 2023-04-19 DIAGNOSIS — M25.662 STIFFNESS OF LEFT KNEE: ICD-10-CM

## 2023-04-19 DIAGNOSIS — R29.898 WEAKNESS OF BOTH LOWER EXTREMITIES: ICD-10-CM

## 2023-04-19 DIAGNOSIS — R26.9 ALTERED GAIT: ICD-10-CM

## 2023-04-19 DIAGNOSIS — M25.562 ACUTE PAIN OF LEFT KNEE: Primary | ICD-10-CM

## 2023-04-19 PROCEDURE — 97112 NEUROMUSCULAR REEDUCATION: CPT | Mod: PN,CQ

## 2023-04-19 PROCEDURE — 97110 THERAPEUTIC EXERCISES: CPT | Mod: PN,CQ

## 2023-04-19 PROCEDURE — 97140 MANUAL THERAPY 1/> REGIONS: CPT | Mod: PN,CQ

## 2023-04-19 NOTE — PROGRESS NOTES
"  [facility]   Physical Therapy Treatment Note     Name: Shawanda Desir  Clinic Number: [id number]    Therapy Diagnosis:   Encounter Diagnoses   Name Primary?    Acute pain of left knee Yes    Stiffness of left knee     Weakness of both lower extremities     Altered gait        Physician: Matthew Patel MD    Visit Date: 4/19/2023    Physician Orders: PT Eval and Treat   Medical Diagnosis from Referral: Primary osteoarthritis of left knee [M17.12]  Evaluation Date: 3/29/2023  Authorization Period Expiration: 4/26/2023  Plan of Care Expiration: 6/30/2023  Progress Note Due: 4/28/2023  Visit # / Visits authorized: 6 / 11 + eval  FOTO: Issued Visit #: 1/3, (capture on visit 1, 5, 10) first on 3/29/2023     Precautions: Standard, DM, prvious Right TKA, prior lumbar Sx     DOS, procedure:  s/p Left TKA on 3/13/2023    PTA Visit #: 1/5     Time In: 1345   Time Out: 1449      Total Time: 64 minutes  Total Billable Time: 46 minutes    SUBJECTIVE     Pt reports: states she is feeling much better. "Ready to work". Has been only using tylenol for pain and feeling much better, energetic, and clearer. Planted a couple of above-ground plants yesterday. No longer using cane but keeps it in the car in case of need.     She was compliant with home exercise program once a day.  Response to previous treatment:  did very well with the exercises progression  Functional change: can tie her shoes now, got out of the house more.  Walking at home without SPC    Pain:  Typically ranges 2-7/10.  Today 0/10   Location: Left knee grossly, mostly anterior     OBJECTIVE     Cold measurement:  Right= -15/-11 to 92/95 (Initial Evaluation was -14/-10 to 100/105)  Left= -8/-6 to 101/104 (Initial Evaluation was  -13/-10 to 97/103)    Warmed up measurement:  Right= -9/-7 to 103/108  Left= -9/-6 to 109/111    Treatment     Shawanda received the treatments listed below:      manual therapy techniques: for 10 minutes, " "including:  Patella mobs, scar tissue massage, Tibial IR, knee extension stretch/Passive Range of Motion as tolerated.  Release to Bilateral calf and hamstring muscles to help with relaxation to allow for better knee extension.     neuromuscular re-education activities for 23 minutes 1:1 supervision. The following activities were included:  Long sitting alternating total knee extension with medium bolster, 5" holds x 4', 1 lb  Long sitting alternating total knee extension with small bolster, 5" holds x 4', 1 lb  Long sitting alternating total knee extension with small bolster + mini SLR x 3'  (VC and TC's needed throughout for VMO recruitment and maintaining throughout straight leg raise)  Long sitting alternating quad set with long 1/2 roll under knees, small roll under ankles  5" holds, x 4'   Seated alternating long arc quad x 4', 3-5" holds, 1 lb (cues for VMO recruitment at [facility])     therapeutic exercises for 13 minutes including:  Long sitting heel prop x 5'--np today (deferred)  Seated alternating knee flexion stretch with CL Lower Extremity overpressure x 4'  Shuttle leg press 25 lbs x 5'   Recumbent bike seat 9, retro x 1.5' then forward x 1.5' for total of 5' today for ROM, strength, endurance  Heel raises, standing x 10  Sit<>stand from standard chair with UE support x 10     cold pack Left knee x 10'.     Plan for Next Visit:  Assess patients response to thelast visit following  manual therapy intervention, therapeutic exercises, pt education, and HEP.  Assess alignment, posture, gait, balance, strength/muscle recruitment, flexibility/tone.  Manual therapy to address alignment, mobility, flexibility, tenderness, soft tissue restrictions and/or presence of trigger points.  Add stretching, stabilization, balance, gait, functional mobility and strengthening exercises as appropriate.  Modalities, if indicated.      Patient Education and Home Exercises     Home Exercises Provided and Patient Education " "Provided     Education provided:   PT provided education and demonstration of HEP to include:   Long sitting alternating quad set with towel roll under knees 5" holds  Long sitting alternating total knee extension with medium bolster, 5" holds  Long sitting alternating total knee extension with small bolster, 5" holds   Long sitting alternating total knee extension with small bolster + mini straight leg raise, 2-3" holds         Pt was instructed to perform these daily with "little bits lots" philosophy.  Pt was given instructions to stop use of HEP if there are any adverse reactions/responses and f/u with PT next treatment to discuss.     Home Exercises Provided: Instructed patient to continue current home exercise program.    Exercises were reviewed and Shawanda was able to demonstrate them prior to the end of the session.  Shawanda demonstrated fair  understanding of the education provided.   See EMR under Patient Instructions for exercises provided during therapy sessions.    ASSESSMENT   Shawanda is a 77 y.o. female referred to outpatient Physical Therapy with a medical diagnosis of Primary osteoarthritis of left knee [M17.12].     Improved pain since she stopped taking strong pain meds, but educated pt on euphoria of no longer feeling great pain and importance of slow wean back into activity to prevent exacerbation of symptoms. ROM improved today. Pt is eager to progress and motivated. Did well with sit<>stands, unaware that she was capable of transfers with her feet and knees in normal position, and pt was observed utilizing better form during other transfers during session without cues. Increased endurance on bike today.     Shawanda Is progressing well towards her goals.   Pt prognosis is Good.     Pt will continue to benefit from skilled outpatient physical therapy to address the deficits listed in the problem list box on initial evaluation, provide pt/family education and to maximize pt's level of " independence in the home and community environment.     Pt's spiritual, cultural and educational needs considered and pt agreeable to plan of care and goals.     Anticipated barriers to physical therapy: Schedule conflicts, transportation, pain, guarding, tolerance, endurance, joint and soft tissue restrictions    Goals:   Short-Term: 4 weeks (4/28/2023)   ongoing  Pt will improve bilateral Active knee extension to > or = -15*  to promote return to prior functional status.  Pt will improve bilateral Active knee flexion to > or = 106* to promote return to prior functional status.  Pt will improve muscle tone/recruitment of hip and Lower Extremity muscles to help promote appropriate progression of PT, HEP, and ADL's    Pt will demonstrate improved gait pattern as evidenced by more upright posture with decreased knee extension lags  Pt will decrease pain levels < or = to 4/10 to help promote appropriate progression of PT, HEP, and ADL's    Pt will be compliant with new home exercise program to assist with PT intervention and help allow appropriate progression through plan of care.     Long-Term: 12 weeks (6/30/2023)   ongoing  Pt will improve bilateralPassive  knee extension to > or = -10* to allow return to normal activities of daily living.  Pt will improve bilateralPassive  knee flexion to > or = 115* to allow return to normal activities of daily living.  Pt will improve bilateral knee extension strength to > or = 5/5 to allow performance of activities of daily living.  Pt will improve bilateral knee flexion strength to > or = 5/5 to allow performance of activities of daily living.  Pt will improve bilateral hip strength to > or = 4+/5 to allow performance of activities of daily living.  Pt will display overall good gait pattern with mild or less deviations without assistive device to improve QOL and allow return to prior functional status.  Pt will report < or = 2-3/10 pain during activities of daily living to  improve QOL and allow return to prior functional status.   Pt will be compliant and independent with HEP to allow and maintain better participation in normal activities  Pt will be able to resume normals ADL's, IADL's, previous activities    PLAN     Progress range of motion, flexibility, reduce guarding, strengthening, stabilization, address joint and soft tissue restrictions, balance, gait, functional mobility as able. Modalities to decrease pain as needed. Progress home exercise program as tolerated.     Outpatient Physical Therapy 1-2 times weekly per POC to include the following interventions:  Electrical Stimulation (attended/unattended), massage, dry needling, Gait Training, Manual Therapy, Moist Heat/ Ice, Neuromuscular Re-ed, Patient Education, Therapeutic Activities, Therapeutic Exercise.     Alycia Walsh, PTA

## 2023-04-21 ENCOUNTER — PES CALL (OUTPATIENT)
Dept: ADMINISTRATIVE | Facility: CLINIC | Age: 78
End: 2023-04-21
Payer: MEDICARE

## 2023-04-21 NOTE — PROGRESS NOTES
[facility]   Physical Therapy Treatment Note     Name: Shawanda Desir  Clinic Number: [id number]    Therapy Diagnosis:   Encounter Diagnoses   Name Primary?    Acute pain of left knee Yes    Stiffness of left knee     Weakness of both lower extremities     Altered gait      Physician: Matthew Patel MD    Visit Date: 4/24/2023    Physician Orders: PT Eval and Treat   Medical Diagnosis from Referral: Primary osteoarthritis of left knee [M17.12]  Evaluation Date: 3/29/2023  Authorization Period Expiration: 4/26/2023  Plan of Care Expiration: 6/30/2023  Progress Note Due: 4/28/2023  Visit # / Visits authorized: 7 / 11 + eval  FOTO: Issued Visit #: 1/3, (capture on visit 1, 5, 10) first on 3/29/2023     Precautions: Standard, DM, prvious Right TKA, prior lumbar Sx     DOS, procedure:  s/p Left TKA on 3/13/2023    PTA Visit #: 0/5     Time In: 1:00  Time Out: 1:53   Total Billable Time: 45 minutes    SUBJECTIVE     Pt reports: states she had a really good weekend.  She went to wedding without c/o.  She has been walking without her SPC.     She was compliant with home exercise program once a day.  Response to previous treatment:  did very well with the exercises progression  Functional change: can tie her shoes now, got out of the house more.  Walking at home without SPC, has been walking outside more without SPC    Pain:  Typically ranges 2-7/10.  Today 0/10   Location: Left knee grossly, mostly anterior     OBJECTIVE     Cold measurement:  Right= -15/-11 to 92/95 (Initial Evaluation was -14/-10 to 100/105)  Left= -8/-6 to 101/104 (Initial Evaluation was  -13/-10 to 97/103)    Warmed up measurement:  Right= -9/-7 to 103/108  Left= -9/-6 to 109/111    Treatment     Shawanda received the treatments listed below:      manual therapy techniques: for 7 minutes, including:  Patella mobs, scar tissue massage, Tibial IR, knee extension stretch/Passive Range of Motion as tolerated.  Release to  "Bilateral calf and hamstring muscles to help with relaxation to allow for better knee extension.     neuromuscular re-education activities for 23 minutes. The following activities were included:  Long sitting alternating total knee extension with medium bolster, 5" holds x 4', 1 lb  Long sitting alternating total knee extension with small bolster, 5" holds x 4', 1 lb  Long sitting alternating total knee extension with small bolster + mini SLR x 3'  (VC and TC's needed throughout for VMO recruitment and maintaining throughout straight leg raise)  Long sitting alternating quad set with long 1/2 roll under knees, small roll under ankles  5" holds, x 4'   Seated alternating long arc quad x 4', 3-5" holds, 1 lb (cues for VMO recruitment at ER)     therapeutic exercises for 19 minutes including:  Long sitting heel prop x 5'--np today (deferred)  Seated alternating knee flexion stretch with CL Lower Extremity overpressure x 4'  Shuttle leg press 25 lbs x 5'   Recumbent bike seat 9, retro x 1.5' then forward x 8.5' for total of 10' today for ROM, strength, endurance  Heel raises, standing x 10  Sit<>stand from standard chair with UE support x 10     cold pack Left knee x 00' (deferred today).     Plan for Next Visit:  Assess patients response to thelast visit following  manual therapy intervention, therapeutic exercises, pt education, and HEP.  Assess alignment, posture, gait, balance, strength/muscle recruitment, flexibility/tone.  Manual therapy to address alignment, mobility, flexibility, tenderness, soft tissue restrictions and/or presence of trigger points.  Add stretching, stabilization, balance, gait, functional mobility and strengthening exercises as appropriate.  Modalities, if indicated.      Patient Education and Home Exercises     Home Exercises Provided and Patient Education Provided     Education provided:   PT provided education and demonstration of HEP to include:   Long sitting alternating quad set with " "towel roll under knees 5" holds  Long sitting alternating total knee extension with medium bolster, 5" holds  Long sitting alternating total knee extension with small bolster, 5" holds   Long sitting alternating total knee extension with small bolster + mini straight leg raise, 2-3" holds         Pt was instructed to perform these daily with "little bits lots" philosophy.  Pt was given instructions to stop use of HEP if there are any adverse reactions/responses and f/u with PT next treatment to discuss.     Home Exercises Provided: Instructed patient to continue current home exercise program.    Exercises were reviewed and Shawanda was able to demonstrate them prior to the end of the session.  Shawanda demonstrated fair  understanding of the education provided.   See EMR under Patient Instructions for exercises provided during therapy sessions.    ASSESSMENT   Shawanda is a 77 y.o. female referred to outpatient Physical Therapy with a medical diagnosis of Primary osteoarthritis of left knee [M17.12].     Improvements in exercise tolerance, pain levels, walking without SPC.  Pt reporting that she felt good enough to not need ice after treatment today.     Shawanda Is progressing well towards her goals.   Pt prognosis is Good.     Pt will continue to benefit from skilled outpatient physical therapy to address the deficits listed in the problem list box on initial evaluation, provide pt/family education and to maximize pt's level of independence in the home and community environment.     Pt's spiritual, cultural and educational needs considered and pt agreeable to plan of care and goals.     Anticipated barriers to physical therapy: Schedule conflicts, transportation, pain, guarding, tolerance, endurance, joint and soft tissue restrictions    Goals:   Short-Term: 4 weeks (4/28/2023)   ongoing  Pt will improve bilateral Active knee extension to > or = -15*  to promote return to prior functional status.  Pt will improve " bilateral Active knee flexion to > or = 106* to promote return to prior functional status.  Pt will improve muscle tone/recruitment of hip and Lower Extremity muscles to help promote appropriate progression of PT, HEP, and ADL's    Pt will demonstrate improved gait pattern as evidenced by more upright posture with decreased knee extension lags  Pt will decrease pain levels < or = to 4/10 to help promote appropriate progression of PT, HEP, and ADL's    Pt will be compliant with new home exercise program to assist with PT intervention and help allow appropriate progression through plan of care.     Long-Term: 12 weeks (6/30/2023)   ongoing  Pt will improve bilateralPassive  knee extension to > or = -10* to allow return to normal activities of daily living.  Pt will improve bilateralPassive  knee flexion to > or = 115* to allow return to normal activities of daily living.  Pt will improve bilateral knee extension strength to > or = 5/5 to allow performance of activities of daily living.  Pt will improve bilateral knee flexion strength to > or = 5/5 to allow performance of activities of daily living.  Pt will improve bilateral hip strength to > or = 4+/5 to allow performance of activities of daily living.  Pt will display overall good gait pattern with mild or less deviations without assistive device to improve QOL and allow return to prior functional status.  Pt will report < or = 2-3/10 pain during activities of daily living to improve QOL and allow return to prior functional status.   Pt will be compliant and independent with HEP to allow and maintain better participation in normal activities  Pt will be able to resume normals ADL's, IADL's, previous activities    PLAN     Progress range of motion, flexibility, reduce guarding, strengthening, stabilization, address joint and soft tissue restrictions, balance, gait, functional mobility as able. Modalities to decrease pain as needed. Progress home exercise program as  tolerated.     Outpatient Physical Therapy 1-2 times weekly per POC to include the following interventions:  Electrical Stimulation (attended/unattended), massage, dry needling, Gait Training, Manual Therapy, Moist Heat/ Ice, Neuromuscular Re-ed, Patient Education, Therapeutic Activities, Therapeutic Exercise.     Chase Pan, PT

## 2023-04-24 ENCOUNTER — CLINICAL SUPPORT (OUTPATIENT)
Dept: REHABILITATION | Facility: HOSPITAL | Age: 78
End: 2023-04-24
Payer: MEDICARE

## 2023-04-24 DIAGNOSIS — M25.662 STIFFNESS OF LEFT KNEE: ICD-10-CM

## 2023-04-24 DIAGNOSIS — R29.898 WEAKNESS OF BOTH LOWER EXTREMITIES: ICD-10-CM

## 2023-04-24 DIAGNOSIS — M25.562 ACUTE PAIN OF LEFT KNEE: Primary | ICD-10-CM

## 2023-04-24 DIAGNOSIS — R26.9 ALTERED GAIT: ICD-10-CM

## 2023-04-24 PROCEDURE — 97112 NEUROMUSCULAR REEDUCATION: CPT | Mod: PN

## 2023-04-24 PROCEDURE — 97110 THERAPEUTIC EXERCISES: CPT | Mod: PN

## 2023-04-25 NOTE — PROGRESS NOTES
[facility]   Physical Therapy Treatment Note     Name: Shawanda Desir  Clinic Number: [id number]    Therapy Diagnosis:   Encounter Diagnoses   Name Primary?    Acute pain of left knee Yes    Stiffness of left knee     Weakness of both lower extremities     Altered gait        Physician: Matthew Patel MD    Visit Date: 4/26/2023    Physician Orders: PT Eval and Treat   Medical Diagnosis from Referral: Primary osteoarthritis of left knee [M17.12]  Evaluation Date: 3/29/2023  Authorization Period Expiration: 4/26/2023  Plan of Care Expiration: 6/30/2023  Progress Note Due: 4/28/2023  Visit # / Visits authorized: 8 / 11 + eval  FOTO: Issued Visit #: 1/3, (capture on visit 1, 5, 10) first on 3/29/2023     Precautions: Standard, DM, prvious Right TKA, prior lumbar Sx     DOS, procedure:  s/p Left TKA on 3/13/2023    PTA Visit #: 0/5     Time In: 10:00  Time Out: 10:50  Total Billable Time: 43 minutes    SUBJECTIVE     Pt reports: states she is doing a lot better.  Some soreness when she does too much but otherwise doing well.  Still walking without SPC.     She was compliant with home exercise program once a day.  Response to previous treatment:  did very well with the exercises progression  Functional change: can tie her shoes now, got out of the house more.  Walking at home without SPC, has been walking outside more without SPC    Pain:  Typically ranges 2-7/10.  Today 0/10   Location: Left knee grossly, mostly anterior     OBJECTIVE     Cold measurement:  Right= -15/-11 to 92/95 (Initial Evaluation was -14/-10 to 100/105)  Left= -8/-6 to 101/104 (Initial Evaluation was  -13/-10 to 97/103)    Warmed up measurement:  Right= -9/-7 to 103/108  Left= -9/-6 to 109/111    Treatment     Shawanda received the treatments listed below:      manual therapy techniques: for 5 minutes, including:  Patella mobs, scar tissue massage, Tibial IR, knee extension stretch/Passive Range of Motion as tolerated.   "Release to Bilateral calf and hamstring muscles to help with relaxation to allow for better knee extension.     neuromuscular re-education activities for 23 minutes. The following activities were included:  Long sitting alternating total knee extension with medium bolster, 5" holds x 4', 2 lb  Long sitting alternating total knee extension with small bolster, 5" holds x 4', 2 lb  Long sitting alternating total knee extension with small bolster + mini SLR x 3'  (VC and TC's needed throughout for VMO recruitment and maintaining throughout straight leg raise)  Long sitting alternating quad set with long 1/2 roll under knees, small roll under ankles  5" holds, x 4'   Seated alternating long arc quad x 4', 3-5" holds, 2 lb (cues for VMO recruitment at [facility])     therapeutic exercises for 15 minutes including:  Long sitting heel prop x 5'--np today (deferred)  Seated alternating knee flexion stretch with CL Lower Extremity overpressure x 4'  Shuttle leg press 25 lbs x 5'   Recumbent bike seat 9, retro x 1.5' then forward x 3.5' for total of 5' today for ROM, strength, endurance  Heel raises, standing x 10  Sit<>stand from standard chair with UE support x 10     cold pack Left knee x 00' (deferred today).     Plan for Next Visit:  Assess patients response to thelast visit following  manual therapy intervention, therapeutic exercises, pt education, and HEP.  Assess alignment, posture, gait, balance, strength/muscle recruitment, flexibility/tone.  Manual therapy to address alignment, mobility, flexibility, tenderness, soft tissue restrictions and/or presence of trigger points.  Add stretching, stabilization, balance, gait, functional mobility and strengthening exercises as appropriate.  Modalities, if indicated.      Patient Education and Home Exercises     Home Exercises Provided and Patient Education Provided     Education provided:   PT provided education and demonstration of HEP to include:   Long sitting alternating quad " "set with towel roll under knees 5" holds  Long sitting alternating total knee extension with medium bolster, 5" holds  Long sitting alternating total knee extension with small bolster, 5" holds   Long sitting alternating total knee extension with small bolster + mini straight leg raise, 2-3" holds         Pt was instructed to perform these daily with "little bits lots" philosophy.  Pt was given instructions to stop use of HEP if there are any adverse reactions/responses and f/u with PT next treatment to discuss.     Home Exercises Provided: Instructed patient to continue current home exercise program.    Exercises were reviewed and Shawanda was able to demonstrate them prior to the end of the session.  Shawanda demonstrated fair  understanding of the education provided.   See EMR under Patient Instructions for exercises provided during therapy sessions.    ASSESSMENT   Shawanda is a 77 y.o. female referred to outpatient Physical Therapy with a medical diagnosis of Primary osteoarthritis of left knee [M17.12].     Improvements in exercise tolerance, pain levels, walking without SPC.  Pt reporting that she felt good enough to not need ice after treatment today but will do it at home.    Shawanda Is progressing well towards her goals.   Pt prognosis is Good.     Pt will continue to benefit from skilled outpatient physical therapy to address the deficits listed in the problem list box on initial evaluation, provide pt/family education and to maximize pt's level of independence in the home and community environment.     Pt's spiritual, cultural and educational needs considered and pt agreeable to plan of care and goals.     Anticipated barriers to physical therapy: Schedule conflicts, transportation, pain, guarding, tolerance, endurance, joint and soft tissue restrictions    Goals:   Short-Term: 4 weeks (4/28/2023)   ongoing  Pt will improve bilateral Active knee extension to > or = -15*  to promote return to prior " functional status.  Pt will improve bilateral Active knee flexion to > or = 106* to promote return to prior functional status.  Pt will improve muscle tone/recruitment of hip and Lower Extremity muscles to help promote appropriate progression of PT, HEP, and ADL's    Pt will demonstrate improved gait pattern as evidenced by more upright posture with decreased knee extension lags  Pt will decrease pain levels < or = to 4/10 to help promote appropriate progression of PT, HEP, and ADL's    Pt will be compliant with new home exercise program to assist with PT intervention and help allow appropriate progression through plan of care.     Long-Term: 12 weeks (6/30/2023)   ongoing  Pt will improve bilateralPassive  knee extension to > or = -10* to allow return to normal activities of daily living.  Pt will improve bilateralPassive  knee flexion to > or = 115* to allow return to normal activities of daily living.  Pt will improve bilateral knee extension strength to > or = 5/5 to allow performance of activities of daily living.  Pt will improve bilateral knee flexion strength to > or = 5/5 to allow performance of activities of daily living.  Pt will improve bilateral hip strength to > or = 4+/5 to allow performance of activities of daily living.  Pt will display overall good gait pattern with mild or less deviations without assistive device to improve QOL and allow return to prior functional status.  Pt will report < or = 2-3/10 pain during activities of daily living to improve QOL and allow return to prior functional status.   Pt will be compliant and independent with HEP to allow and maintain better participation in normal activities  Pt will be able to resume normals ADL's, IADL's, previous activities    PLAN     Progress range of motion, flexibility, reduce guarding, strengthening, stabilization, address joint and soft tissue restrictions, balance, gait, functional mobility as able. Modalities to decrease pain as needed.  Progress home exercise program as tolerated.     Outpatient Physical Therapy 1-2 times weekly per POC to include the following interventions:  Electrical Stimulation (attended/unattended), massage, dry needling, Gait Training, Manual Therapy, Moist Heat/ Ice, Neuromuscular Re-ed, Patient Education, Therapeutic Activities, Therapeutic Exercise.     Chase Pan, PT

## 2023-04-26 ENCOUNTER — CLINICAL SUPPORT (OUTPATIENT)
Dept: REHABILITATION | Facility: HOSPITAL | Age: 78
End: 2023-04-26
Payer: MEDICARE

## 2023-04-26 DIAGNOSIS — R29.898 WEAKNESS OF BOTH LOWER EXTREMITIES: ICD-10-CM

## 2023-04-26 DIAGNOSIS — M25.562 ACUTE PAIN OF LEFT KNEE: Primary | ICD-10-CM

## 2023-04-26 DIAGNOSIS — M25.662 STIFFNESS OF LEFT KNEE: ICD-10-CM

## 2023-04-26 DIAGNOSIS — R26.9 ALTERED GAIT: ICD-10-CM

## 2023-04-26 PROCEDURE — 97110 THERAPEUTIC EXERCISES: CPT | Mod: PN

## 2023-04-26 PROCEDURE — 97112 NEUROMUSCULAR REEDUCATION: CPT | Mod: PN

## 2023-05-02 NOTE — PROGRESS NOTES
[facility]   Physical Therapy Treatment and discharge Note     Name: Shawanda Desir  Clinic Number: [id number]    Therapy Diagnosis:   Encounter Diagnoses   Name Primary?    Acute pain of left knee Yes    Stiffness of left knee     Weakness of both lower extremities     Altered gait      Physician: Matthew Patel MD    Visit Date: 5/3/2023    Physician Orders: PT Eval and Treat   Medical Diagnosis from Referral: Primary osteoarthritis of left knee [M17.12]  Evaluation Date: 3/29/2023  Authorization Period Expiration: 4/26/2023  Plan of Care Expiration: 6/30/2023  Progress Note Due: 5/3/2023  Visit # / Visits authorized: 9 / 11 + eval  FOTO: Issued Visit #: 1/3, (capture on visit 1, 5, 10) first on 3/29/2023     Precautions: Standard, DM, prvious Right TKA, prior lumbar Sx     DOS, procedure:  s/p Left TKA on 3/13/2023    PTA Visit #: 0/5     Time In: 11:30  Time Out: 12:05  Total Billable Time: 28 minutes    SUBJECTIVE     Pt reports: states she is doing a lot better.  Some soreness when she does too much but otherwise doing well.  Still walking without SPC household and community level ambulation.  Feeling really good and happy with her progress.  She currently has too much personal stuff going on to continue with PT at this time.  Requesting discharge to home exercise program.  PT discussed with pt that she could request more PT in the future once things settle down if needed.    She was compliant with home exercise program once a day.  Response to previous treatment:  did very well with the exercises progression  Functional change: can tie her shoes now, got out of the house more.  Walking at home without SPC, has been walking outside more without SPC    Pain:  Typically ranges 2-7/10.  Today 0/10   Location: Left knee grossly, mostly anterior     OBJECTIVE     Cold measurement:  Right=   -11/-7 to 98/102 (Initial Evaluation was -14/-10 to 100/105)  Left=   -8/-6 to 101/106 (Initial  "Evaluation was  -13/-10 to 97/103)    Warmed up measurement:  Right= -9/-7 to 103/108  Left= -9/-6 to 109/111    Transfers:  sit to stand from chair with moderate use of Bilateral Upper Extremities.  Able to position Bilateral Lower Extremity under her ROXANNE fairly well during the transfer.  Has progressed to transfers with mild use of Upper Extremities.     Gait:  walks with no AD household levels with mild Left knee extension lag, mild Right knee extension lag.  No longer uses SPC with community ambulation.     Range of Motion:     Left  Right    Knee Extension  -8/-6 was -13/-10  -11/-7 was -14/-10    Knee Flexion  101/106 was 97/103  100/105      Strength:     Left  Right    Knee Flexion 4+/5 was 4-/5 4+/5    Knee Extension 5/5 was 4+/5 5-/5    Hip External Rotation 5-/5 was 4+/5    5-/5 was 4+/5         Recruitment/tone:  Pt displays Fair Left quad tone while performing long sitting quad set.            Pt displays Fair Right quad tone while performing long sitting quad set.     SLR:  Pt able to perform Left SLR with 15 was -21* knee extension lag.            Pt able to perform Right SLR with -15 was -19* knee extension lag     Treatment     Shawanda received the treatments listed below:      manual therapy techniques: for 5 minutes, including:  Patella mobs, scar tissue massage, Tibial IR, knee extension stretch/Passive Range of Motion as tolerated.  Release to Bilateral calf and hamstring muscles to help with relaxation to allow for better knee extension.     neuromuscular re-education activities for 23 minutes. The following activities were included:  Long sitting alternating total knee extension with medium bolster, 5" holds x 4', 2 lb  Long sitting alternating total knee extension with small bolster, 5" holds x 4', 2 lb  Long sitting alternating total knee extension with small bolster + mini SLR x 3'  (VC and TC's needed throughout for VMO recruitment and maintaining throughout straight leg raise)  Long " "sitting alternating quad set with long 1/2 roll under knees, small roll under ankles  5" holds, x 4'   Seated alternating long arc quad x 4', 3-5" holds, 2 lb (cues for VMO recruitment at ER)     therapeutic exercises for 00 minutes including: (deferred today)  Long sitting heel prop x 5'--np today (deferred)  Seated alternating knee flexion stretch with CL Lower Extremity overpressure x 4'  Shuttle leg press 25 lbs x 5'   Recumbent bike seat 9, retro x 1.5' then forward x 3.5' for total of 5' today for ROM, strength, endurance  Heel raises, standing x 10  Sit<>stand from standard chair with UE support x 10     cold pack Left knee x 00' (deferred today).     Plan for Next Visit:  Assess patients response to thelast visit following  manual therapy intervention, therapeutic exercises, pt education, and HEP.  Assess alignment, posture, gait, balance, strength/muscle recruitment, flexibility/tone.  Manual therapy to address alignment, mobility, flexibility, tenderness, soft tissue restrictions and/or presence of trigger points.  Add stretching, stabilization, balance, gait, functional mobility and strengthening exercises as appropriate.  Modalities, if indicated.      Patient Education and Home Exercises     Home Exercises Provided and Patient Education Provided     Education provided:   PT provided education and demonstration of HEP to include:   Long sitting alternating quad set with towel roll under knees 5" holds  Long sitting alternating total knee extension with medium bolster, 5" holds  Long sitting alternating total knee extension with small bolster, 5" holds   Long sitting alternating total knee extension with small bolster + mini straight leg raise, 2-3" holds         Pt was instructed to perform these daily with "little bits lots" philosophy.  Pt was given instructions to stop use of HEP if there are any adverse reactions/responses and f/u with PT next treatment to discuss.     Home Exercises Provided: " Instructed patient to continue current home exercise program.    Exercises were reviewed and Shawanda was able to demonstrate them prior to the end of the session.  Shawanda demonstrated fair  understanding of the education provided.   See EMR under Patient Instructions for exercises provided during therapy sessions.    ASSESSMENT   Shawanda is a 77 y.o. female referred to outpatient Physical Therapy with a medical diagnosis of Primary osteoarthritis of left knee [M17.12].   Pt has made some modest gains towards her goals with PT but has not met all her goals yet.  Per her request, she is getting discharged to home exercise program to progress more on her own.   We did educate her on the option to request more PT in the future if needed to help address deficits that were not met or might show regression while she is attending to personal stuff and possibly not able to be as compliant with her home exercise program as needed.    Shawanda Is progressing towards her goals.   Pt prognosis is Good.     Pt would continue to benefit from skilled outpatient physical therapy to address the deficits listed in the problem list box on initial evaluation, provide pt/family education and to maximize pt's level of independence in the home and community environment.   However she is requesting discharge from PT at this time.    Pt's spiritual, cultural and educational needs considered and pt agreeable to plan of care and goals.     Anticipated barriers to physical therapy: Schedule conflicts, transportation, pain, guarding, tolerance, endurance, joint and soft tissue restrictions    Goals:   Short-Term: 4 weeks (4/28/2023)   mostly met to met  Pt will improve bilateral Active knee extension to > or = -15*  to promote return to prior functional status.  Pt will improve bilateral Active knee flexion to > or = 106* to promote return to prior functional status.  Pt will improve muscle tone/recruitment of hip and Lower Extremity muscles to  help promote appropriate progression of PT, HEP, and ADL's    Pt will demonstrate improved gait pattern as evidenced by more upright posture with decreased knee extension lags  Pt will decrease pain levels < or = to 4/10 to help promote appropriate progression of PT, HEP, and ADL's    Pt will be compliant with new home exercise program to assist with PT intervention and help allow appropriate progression through plan of care.     Long-Term: 12 weeks (6/30/2023)   partially met  Pt will improve bilateralPassive  knee extension to > or = -10* to allow return to normal activities of daily living.  Pt will improve bilateralPassive  knee flexion to > or = 115* to allow return to normal activities of daily living.  Pt will improve bilateral knee extension strength to > or = 5/5 to allow performance of activities of daily living.  Pt will improve bilateral knee flexion strength to > or = 5/5 to allow performance of activities of daily living.  Pt will improve bilateral hip strength to > or = 4+/5 to allow performance of activities of daily living.  Pt will display overall good gait pattern with mild or less deviations without assistive device to improve QOL and allow return to prior functional status.  Pt will report < or = 2-3/10 pain during activities of daily living to improve QOL and allow return to prior functional status.   Pt will be compliant and independent with HEP to allow and maintain better participation in normal activities  Pt will be able to resume normals ADL's, IADL's, previous activities    PLAN     Discharge to home exercise program per pt request.    Chase Pan, PT

## 2023-05-03 ENCOUNTER — EXTERNAL HOME HEALTH (OUTPATIENT)
Dept: HOME HEALTH SERVICES | Facility: HOSPITAL | Age: 78
End: 2023-05-03
Payer: MEDICARE

## 2023-05-03 ENCOUNTER — CLINICAL SUPPORT (OUTPATIENT)
Dept: REHABILITATION | Facility: HOSPITAL | Age: 78
End: 2023-05-03
Payer: MEDICARE

## 2023-05-03 DIAGNOSIS — M25.562 ACUTE PAIN OF LEFT KNEE: Primary | ICD-10-CM

## 2023-05-03 DIAGNOSIS — R26.9 ALTERED GAIT: ICD-10-CM

## 2023-05-03 DIAGNOSIS — M25.662 STIFFNESS OF LEFT KNEE: ICD-10-CM

## 2023-05-03 DIAGNOSIS — R29.898 WEAKNESS OF BOTH LOWER EXTREMITIES: ICD-10-CM

## 2023-05-03 PROCEDURE — 97112 NEUROMUSCULAR REEDUCATION: CPT | Mod: PN

## 2023-05-08 DIAGNOSIS — M17.12 PRIMARY OSTEOARTHRITIS OF LEFT KNEE: Primary | ICD-10-CM

## 2023-05-09 ENCOUNTER — OFFICE VISIT (OUTPATIENT)
Dept: ORTHOPEDICS | Facility: CLINIC | Age: 78
End: 2023-05-09
Payer: MEDICARE

## 2023-05-09 ENCOUNTER — HOSPITAL ENCOUNTER (OUTPATIENT)
Dept: RADIOLOGY | Facility: HOSPITAL | Age: 78
Discharge: HOME | End: 2023-05-09
Attending: NURSE PRACTITIONER
Payer: MEDICARE

## 2023-05-09 VITALS — WEIGHT: 187 LBS | HEIGHT: 60 IN | BODY MASS INDEX: 36.71 KG/M2

## 2023-05-09 DIAGNOSIS — Z96.652 S/P TOTAL KNEE ARTHROPLASTY, LEFT: Primary | ICD-10-CM

## 2023-05-09 DIAGNOSIS — M17.12 PRIMARY OSTEOARTHRITIS OF LEFT KNEE: ICD-10-CM

## 2023-05-09 PROCEDURE — 73560 X-RAY EXAM OF KNEE 1 OR 2: CPT | Mod: 59,TC,PO,RT

## 2023-05-09 PROCEDURE — 73560 XR KNEE ORTHO LEFT: ICD-10-PCS | Mod: 26,RT,, | Performed by: RADIOLOGY

## 2023-05-09 PROCEDURE — 1126F AMNT PAIN NOTED NONE PRSNT: CPT | Mod: CPTII,S$GLB,, | Performed by: NURSE PRACTITIONER

## 2023-05-09 PROCEDURE — 99024 POSTOP FOLLOW-UP VISIT: CPT | Mod: S$GLB,,, | Performed by: NURSE PRACTITIONER

## 2023-05-09 PROCEDURE — 1126F PR PAIN SEVERITY QUANTIFIED, NO PAIN PRESENT: ICD-10-PCS | Mod: CPTII,S$GLB,, | Performed by: NURSE PRACTITIONER

## 2023-05-09 PROCEDURE — 73562 XR KNEE ORTHO LEFT: ICD-10-PCS | Mod: 26,LT,, | Performed by: RADIOLOGY

## 2023-05-09 PROCEDURE — 1159F PR MEDICATION LIST DOCUMENTED IN MEDICAL RECORD: ICD-10-PCS | Mod: CPTII,S$GLB,, | Performed by: NURSE PRACTITIONER

## 2023-05-09 PROCEDURE — 73562 X-RAY EXAM OF KNEE 3: CPT | Mod: 26,LT,, | Performed by: RADIOLOGY

## 2023-05-09 PROCEDURE — 73560 X-RAY EXAM OF KNEE 1 OR 2: CPT | Mod: 26,RT,, | Performed by: RADIOLOGY

## 2023-05-09 PROCEDURE — 1160F PR REVIEW ALL MEDS BY PRESCRIBER/CLIN PHARMACIST DOCUMENTED: ICD-10-PCS | Mod: CPTII,S$GLB,, | Performed by: NURSE PRACTITIONER

## 2023-05-09 PROCEDURE — 99024 PR POST-OP FOLLOW-UP VISIT: ICD-10-PCS | Mod: S$GLB,,, | Performed by: NURSE PRACTITIONER

## 2023-05-09 PROCEDURE — 99999 PR PBB SHADOW E&M-EST. PATIENT-LVL III: CPT | Mod: PBBFAC,,, | Performed by: NURSE PRACTITIONER

## 2023-05-09 PROCEDURE — 1159F MED LIST DOCD IN RCRD: CPT | Mod: CPTII,S$GLB,, | Performed by: NURSE PRACTITIONER

## 2023-05-09 PROCEDURE — 99999 PR PBB SHADOW E&M-EST. PATIENT-LVL III: ICD-10-PCS | Mod: PBBFAC,,, | Performed by: NURSE PRACTITIONER

## 2023-05-09 PROCEDURE — 1160F RVW MEDS BY RX/DR IN RCRD: CPT | Mod: CPTII,S$GLB,, | Performed by: NURSE PRACTITIONER

## 2023-05-09 NOTE — PROGRESS NOTES
Chief Complaint   Patient presents with    Left Knee - Post-op Evaluation       HPI:  77 y.o. returns to clinic today status post left TKA 8 weeks ago. Pain is intermittent. Patient is compliant most of the time with restrictions.     Left knee   ROM 2-120  Incision is healed. No redness.     X-rays were performed today, personally reviewed by me and findings discussed with the patient.  3 views of the left knee show implants intact without any evidence of loosening.       Shawanda was seen today for post-op evaluation.    Diagnoses and all orders for this visit:    S/P total knee arthroplasty, left       Continue self guided exercises.   She has completed P.T. and is doing well.   Rtc in 4 months or as needed.

## 2023-05-23 ENCOUNTER — OFFICE VISIT (OUTPATIENT)
Dept: HOME HEALTH SERVICES | Facility: CLINIC | Age: 78
End: 2023-05-23
Payer: MEDICARE

## 2023-05-23 VITALS
HEIGHT: 60 IN | HEART RATE: 90 BPM | WEIGHT: 182 LBS | DIASTOLIC BLOOD PRESSURE: 81 MMHG | BODY MASS INDEX: 35.73 KG/M2 | OXYGEN SATURATION: 96 % | SYSTOLIC BLOOD PRESSURE: 148 MMHG

## 2023-05-23 DIAGNOSIS — E11.29 TYPE 2 DIABETES MELLITUS WITH MICROALBUMINURIA, WITHOUT LONG-TERM CURRENT USE OF INSULIN: ICD-10-CM

## 2023-05-23 DIAGNOSIS — H35.033 HYPERTENSIVE RETINOPATHY, BILATERAL: ICD-10-CM

## 2023-05-23 DIAGNOSIS — I10 ESSENTIAL HYPERTENSION: ICD-10-CM

## 2023-05-23 DIAGNOSIS — H40.023 OPEN ANGLE WITH BORDERLINE FINDINGS AND HIGH GLAUCOMA RISK IN BOTH EYES: ICD-10-CM

## 2023-05-23 DIAGNOSIS — E78.49 OTHER HYPERLIPIDEMIA: ICD-10-CM

## 2023-05-23 DIAGNOSIS — I77.1 STRICTURE OF ARTERY: ICD-10-CM

## 2023-05-23 DIAGNOSIS — M17.0 BILATERAL PRIMARY OSTEOARTHRITIS OF KNEE: ICD-10-CM

## 2023-05-23 DIAGNOSIS — Z00.00 ENCOUNTER FOR PREVENTIVE HEALTH EXAMINATION: Primary | ICD-10-CM

## 2023-05-23 DIAGNOSIS — E66.01 CLASS 2 SEVERE OBESITY DUE TO EXCESS CALORIES WITH SERIOUS COMORBIDITY AND BODY MASS INDEX (BMI) OF 36.0 TO 36.9 IN ADULT: ICD-10-CM

## 2023-05-23 DIAGNOSIS — R80.9 TYPE 2 DIABETES MELLITUS WITH MICROALBUMINURIA, WITHOUT LONG-TERM CURRENT USE OF INSULIN: ICD-10-CM

## 2023-05-23 DIAGNOSIS — I20.89 OTHER FORMS OF ANGINA PECTORIS: ICD-10-CM

## 2023-05-23 DIAGNOSIS — I50.32 CHRONIC DIASTOLIC CONGESTIVE HEART FAILURE: ICD-10-CM

## 2023-05-23 PROCEDURE — 1101F PR PT FALLS ASSESS DOC 0-1 FALLS W/OUT INJ PAST YR: ICD-10-PCS | Mod: CPTII,S$GLB,, | Performed by: NURSE PRACTITIONER

## 2023-05-23 PROCEDURE — 3079F PR MOST RECENT DIASTOLIC BLOOD PRESSURE 80-89 MM HG: ICD-10-PCS | Mod: CPTII,S$GLB,, | Performed by: NURSE PRACTITIONER

## 2023-05-23 PROCEDURE — 3077F SYST BP >= 140 MM HG: CPT | Mod: CPTII,S$GLB,, | Performed by: NURSE PRACTITIONER

## 2023-05-23 PROCEDURE — 99499 RISK ADDL DX/OHS AUDIT: ICD-10-PCS | Mod: S$GLB,,, | Performed by: NURSE PRACTITIONER

## 2023-05-23 PROCEDURE — 1159F PR MEDICATION LIST DOCUMENTED IN MEDICAL RECORD: ICD-10-PCS | Mod: CPTII,S$GLB,, | Performed by: NURSE PRACTITIONER

## 2023-05-23 PROCEDURE — 3288F FALL RISK ASSESSMENT DOCD: CPT | Mod: CPTII,S$GLB,, | Performed by: NURSE PRACTITIONER

## 2023-05-23 PROCEDURE — 3077F PR MOST RECENT SYSTOLIC BLOOD PRESSURE >= 140 MM HG: ICD-10-PCS | Mod: CPTII,S$GLB,, | Performed by: NURSE PRACTITIONER

## 2023-05-23 PROCEDURE — 1159F MED LIST DOCD IN RCRD: CPT | Mod: CPTII,S$GLB,, | Performed by: NURSE PRACTITIONER

## 2023-05-23 PROCEDURE — G0439 PR MEDICARE ANNUAL WELLNESS SUBSEQUENT VISIT: ICD-10-PCS | Mod: S$GLB,,, | Performed by: NURSE PRACTITIONER

## 2023-05-23 PROCEDURE — 1160F RVW MEDS BY RX/DR IN RCRD: CPT | Mod: CPTII,S$GLB,, | Performed by: NURSE PRACTITIONER

## 2023-05-23 PROCEDURE — 99499 UNLISTED E&M SERVICE: CPT | Mod: S$GLB,,, | Performed by: NURSE PRACTITIONER

## 2023-05-23 PROCEDURE — 1160F PR REVIEW ALL MEDS BY PRESCRIBER/CLIN PHARMACIST DOCUMENTED: ICD-10-PCS | Mod: CPTII,S$GLB,, | Performed by: NURSE PRACTITIONER

## 2023-05-23 PROCEDURE — G0439 PPPS, SUBSEQ VISIT: HCPCS | Mod: S$GLB,,, | Performed by: NURSE PRACTITIONER

## 2023-05-23 PROCEDURE — 3079F DIAST BP 80-89 MM HG: CPT | Mod: CPTII,S$GLB,, | Performed by: NURSE PRACTITIONER

## 2023-05-23 PROCEDURE — 1101F PT FALLS ASSESS-DOCD LE1/YR: CPT | Mod: CPTII,S$GLB,, | Performed by: NURSE PRACTITIONER

## 2023-05-23 PROCEDURE — 3288F PR FALLS RISK ASSESSMENT DOCUMENTED: ICD-10-PCS | Mod: CPTII,S$GLB,, | Performed by: NURSE PRACTITIONER

## 2023-05-23 RX ORDER — ASPIRIN 81 MG/1
81 TABLET ORAL DAILY
COMMUNITY

## 2023-05-24 ENCOUNTER — OFFICE VISIT (OUTPATIENT)
Dept: FAMILY MEDICINE | Facility: CLINIC | Age: 78
End: 2023-05-24
Payer: MEDICARE

## 2023-05-24 VITALS
HEART RATE: 97 BPM | BODY MASS INDEX: 36.4 KG/M2 | SYSTOLIC BLOOD PRESSURE: 138 MMHG | HEIGHT: 60 IN | DIASTOLIC BLOOD PRESSURE: 68 MMHG | OXYGEN SATURATION: 98 % | WEIGHT: 185.44 LBS

## 2023-05-24 DIAGNOSIS — I10 ESSENTIAL HYPERTENSION: ICD-10-CM

## 2023-05-24 DIAGNOSIS — R80.9 TYPE 2 DIABETES MELLITUS WITH MICROALBUMINURIA, WITHOUT LONG-TERM CURRENT USE OF INSULIN: Primary | ICD-10-CM

## 2023-05-24 DIAGNOSIS — E78.49 OTHER HYPERLIPIDEMIA: ICD-10-CM

## 2023-05-24 DIAGNOSIS — E11.29 TYPE 2 DIABETES MELLITUS WITH MICROALBUMINURIA, WITHOUT LONG-TERM CURRENT USE OF INSULIN: Primary | ICD-10-CM

## 2023-05-24 PROCEDURE — 1101F PR PT FALLS ASSESS DOC 0-1 FALLS W/OUT INJ PAST YR: ICD-10-PCS | Mod: CPTII,S$GLB,, | Performed by: INTERNAL MEDICINE

## 2023-05-24 PROCEDURE — 99999 PR PBB SHADOW E&M-EST. PATIENT-LVL III: ICD-10-PCS | Mod: PBBFAC,,, | Performed by: INTERNAL MEDICINE

## 2023-05-24 PROCEDURE — 90677 PNEUMOCOCCAL CONJUGATE VACCINE 20-VALENT: ICD-10-PCS | Mod: S$GLB,,, | Performed by: INTERNAL MEDICINE

## 2023-05-24 PROCEDURE — 3288F FALL RISK ASSESSMENT DOCD: CPT | Mod: CPTII,S$GLB,, | Performed by: INTERNAL MEDICINE

## 2023-05-24 PROCEDURE — G0009 ADMIN PNEUMOCOCCAL VACCINE: HCPCS | Mod: S$GLB,,, | Performed by: INTERNAL MEDICINE

## 2023-05-24 PROCEDURE — 3075F SYST BP GE 130 - 139MM HG: CPT | Mod: CPTII,S$GLB,, | Performed by: INTERNAL MEDICINE

## 2023-05-24 PROCEDURE — G0009 PNEUMOCOCCAL CONJUGATE VACCINE 20-VALENT: ICD-10-PCS | Mod: S$GLB,,, | Performed by: INTERNAL MEDICINE

## 2023-05-24 PROCEDURE — 90677 PCV20 VACCINE IM: CPT | Mod: S$GLB,,, | Performed by: INTERNAL MEDICINE

## 2023-05-24 PROCEDURE — 3078F PR MOST RECENT DIASTOLIC BLOOD PRESSURE < 80 MM HG: ICD-10-PCS | Mod: CPTII,S$GLB,, | Performed by: INTERNAL MEDICINE

## 2023-05-24 PROCEDURE — 99999 PR PBB SHADOW E&M-EST. PATIENT-LVL III: CPT | Mod: PBBFAC,,, | Performed by: INTERNAL MEDICINE

## 2023-05-24 PROCEDURE — 99214 OFFICE O/P EST MOD 30 MIN: CPT | Mod: S$GLB,,, | Performed by: INTERNAL MEDICINE

## 2023-05-24 PROCEDURE — 3075F PR MOST RECENT SYSTOLIC BLOOD PRESS GE 130-139MM HG: ICD-10-PCS | Mod: CPTII,S$GLB,, | Performed by: INTERNAL MEDICINE

## 2023-05-24 PROCEDURE — 3078F DIAST BP <80 MM HG: CPT | Mod: CPTII,S$GLB,, | Performed by: INTERNAL MEDICINE

## 2023-05-24 PROCEDURE — 99214 PR OFFICE/OUTPT VISIT, EST, LEVL IV, 30-39 MIN: ICD-10-PCS | Mod: S$GLB,,, | Performed by: INTERNAL MEDICINE

## 2023-05-24 PROCEDURE — 1101F PT FALLS ASSESS-DOCD LE1/YR: CPT | Mod: CPTII,S$GLB,, | Performed by: INTERNAL MEDICINE

## 2023-05-24 PROCEDURE — 1126F PR PAIN SEVERITY QUANTIFIED, NO PAIN PRESENT: ICD-10-PCS | Mod: CPTII,S$GLB,, | Performed by: INTERNAL MEDICINE

## 2023-05-24 PROCEDURE — 1126F AMNT PAIN NOTED NONE PRSNT: CPT | Mod: CPTII,S$GLB,, | Performed by: INTERNAL MEDICINE

## 2023-05-24 PROCEDURE — 3288F PR FALLS RISK ASSESSMENT DOCUMENTED: ICD-10-PCS | Mod: CPTII,S$GLB,, | Performed by: INTERNAL MEDICINE

## 2023-05-24 RX ORDER — TRIAMCINOLONE ACETONIDE 1 MG/ML
LOTION TOPICAL 2 TIMES DAILY PRN
Qty: 60 ML | Refills: 5 | Status: SHIPPED | OUTPATIENT
Start: 2023-05-24

## 2023-05-24 NOTE — PROGRESS NOTES
Subjective     Patient ID: Shawanda Desir is a 77 y.o. female.    Chief Complaint: Follow-up (3 month f/u)    Pt here for three mo check up    Htn: stable on medication  Dm: a1c 6.9 3 mo ago, not checking sugars  Hyperlipidemia: utd labs stable on medciation       Follow-up  Pertinent negatives include no chest pain, fever or headaches.   Review of Systems   Constitutional:  Negative for fever.   Respiratory:  Negative for shortness of breath.    Cardiovascular:  Negative for chest pain.   Neurological:  Negative for headaches.        Objective     Physical Exam  Constitutional:       Appearance: Normal appearance.   HENT:      Head: Normocephalic.   Cardiovascular:      Rate and Rhythm: Normal rate and regular rhythm.   Pulmonary:      Effort: Pulmonary effort is normal.      Breath sounds: Normal breath sounds.   Musculoskeletal:         General: Normal range of motion.      Cervical back: Normal range of motion.   Neurological:      General: No focal deficit present.      Mental Status: She is alert.   Psychiatric:         Mood and Affect: Mood normal.         Behavior: Behavior normal.          Assessment and Plan     Problem List Items Addressed This Visit          Cardiac/Vascular    Other hyperlipidemia    Essential hypertension       Endocrine    Type 2 diabetes mellitus with microalbuminuria, without long-term current use of insulin - Primary       Htn- stable  Dm- stable. A1c next visit  Pcv 20 today

## 2023-05-29 NOTE — PATIENT INSTRUCTIONS
Counseling and Referral of Other Preventative  (Italic type indicates deductible and co-insurance are waived)    Patient Name: Shawanda Desir  Today's Date: 5/29/2023    Health Maintenance       Date Due Completion Date    TETANUS VACCINE Never done ---    Shingles Vaccine (1 of 2) Never done ---    Eye Exam 07/18/2020 7/18/2019    COVID-19 Vaccine (5 - Pfizer series) 03/01/2023 11/1/2022    Hemoglobin A1c 08/23/2023 2/23/2023    Diabetes Urine Screening 10/25/2023 10/25/2022    Lipid Panel 02/23/2024 2/23/2023    DEXA Scan 02/28/2026 2/28/2023        No orders of the defined types were placed in this encounter.    The following information is provided to all patients.  This information is to help you find resources for any of the problems found today that may be affecting your health:                Living healthy guide: www.[facility].[place].[facility]      Understanding Diabetes: www.diabetes.org      Eating healthy: www.cdc.gov/healthyweight      CDC home safety checklist: www.cdc.gov/steadi/patient.html      Agency on Aging: www.goea.[place].[facility]      Alcoholics anonymous (AA): www.aa.org      Physical Activity: www.carrie.nih.gov/rm8ptsb      Tobacco use: www.quitwithusla.org

## 2023-05-29 NOTE — PROGRESS NOTES
Shawanda Desir presented for a  Medicare AWV and comprehensive Health Risk Assessment today. The following components were reviewed and updated:    Medical history  Family History  Social history  Allergies and Current Medications  Health Risk Assessment  Health Maintenance  Care Team         ** See Completed Assessments for Annual Wellness Visit within the encounter summary.**         The following assessments were completed:  Living Situation  CAGE  Depression Screening  Timed Get Up and Go  Whisper Test  Cognitive Function Screening  Nutrition Screening  ADL Screening  PAQ Screening        Vitals:    05/23/23 1037   BP: (!) 148/81   Pulse: 90   SpO2: 96%   Weight: 82.6 kg (182 lb)   Height: 5' (1.524 m)     Body mass index is 35.54 kg/m².  Physical Exam  Constitutional:       Appearance: Normal appearance.   HENT:      Head: Normocephalic and atraumatic.      Nose: Nose normal.   Eyes:      Extraocular Movements: Extraocular movements intact.      Pupils: Pupils are equal, round, and reactive to light.   Cardiovascular:      Rate and Rhythm: Normal rate and regular rhythm.      Pulses: Normal pulses.   Pulmonary:      Effort: Pulmonary effort is normal.   Musculoskeletal:      Cervical back: Normal range of motion and neck supple.   Neurological:      General: No focal deficit present.      Mental Status: She is alert and oriented to person, place, and time.             Diagnoses and health risks identified today and associated recommendations/orders:    1. Encounter for preventive health examination  Awv completed    2. Other forms of angina pectoris  Chronic and stable. Continue current treatment. Follow with PCP.      3. Stricture of artery  Chronic and stable. Continue current treatment. Follow with PCP.      4. Class 2 severe obesity due to excess calories with serious comorbidity and body mass index (BMI) of 36.0 to 36.9 in adult  Chronic and stable. Continue current treatment. Follow with PCP.      5.  Type 2 diabetes mellitus with microalbuminuria, without long-term current use of insulin  Chronic and stable. Continue current treatment. Follow with PCP.]    6. Hypertensive retinopathy, bilateral  Chronic and stable. Continue current treatment. Follow with PCP.      7. Open angle with borderline findings and high glaucoma risk in both eyes  Chronic and stable. Continue current treatment. Follow with PCP.  On drops    8. Chronic diastolic congestive heart failure  Chronic and stable. Continue current treatment. Follow with PCP.      9. Essential hypertension  Chronic and stable. Continue current treatment. Follow with PCP.  On meds      10. Other hyperlipidemia  Chronic and stable. Continue current treatment. Follow with PCP.  On statin     11. Bilateral primary osteoarthritis of knee  Chronic and stable. Continue current treatment. Follow with PCP.        Provided Shawanda with a 5-10 year written screening schedule and personal prevention plan. Recommendations were developed using the USPSTF age appropriate recommendations. Education, counseling, and referrals were provided as needed. After Visit Summary printed and given to patient which includes a list of additional screenings\tests needed.    Fu in 1 yr for SHAWANDA Jimenez NP  I offered to discuss advanced care planning, including how to pick a person who would make decisions for you if you were unable to make them for yourself, called a health care power of , and what kind of decisions you might make such as use of life sustaining treatments such as ventilators and tube feeding when faced with a life limiting illness recorded on a living will that they will need to know. (How you want to be cared for as you near the end of your natural life)     X Patient is interested in learning more about how to make advanced directives.  I provided them paperwork and offered to discuss this with them.

## 2023-06-13 LAB
LEFT EYE DM RETINOPATHY: NEGATIVE
RIGHT EYE DM RETINOPATHY: NEGATIVE

## 2023-06-28 NOTE — TELEPHONE ENCOUNTER
"----- Message from Betito Ospina sent at 6/28/2023  4:42 PM CDT -----  Regarding: med question  Type: Needs Medical Advice    Who Called:  Shawanda    Pharmacy name and phone #:    [facility]/pharmacy #[id number] - [place], [place] - [address]  [address]  [place] [address]  Phone: [phone] Fax: [phone]    Best Call Back Number: [phone]    Additional Information: pt has not received refill for "kidney medicine" pt does not know name or have bottle. Pt needs office to call and discuss med and [facility]>        "

## 2023-06-29 RX ORDER — ALLOPURINOL 100 MG/1
100 TABLET ORAL DAILY
Qty: 90 TABLET | Refills: 0 | Status: SHIPPED | OUTPATIENT
Start: 2023-06-29 | End: 2024-01-03

## 2023-07-10 ENCOUNTER — TELEPHONE (OUTPATIENT)
Dept: FAMILY MEDICINE | Facility: CLINIC | Age: 78
End: 2023-07-10
Payer: MEDICARE

## 2023-07-10 DIAGNOSIS — R80.9 TYPE 2 DIABETES MELLITUS WITH MICROALBUMINURIA, WITHOUT LONG-TERM CURRENT USE OF INSULIN: Primary | ICD-10-CM

## 2023-07-10 DIAGNOSIS — E11.29 TYPE 2 DIABETES MELLITUS WITH MICROALBUMINURIA, WITHOUT LONG-TERM CURRENT USE OF INSULIN: Primary | ICD-10-CM

## 2023-07-10 NOTE — TELEPHONE ENCOUNTER
----- Message from Tiffanie Odom sent at 7/10/2023 11:07 AM CDT -----  Regarding: Orders  Type:  Needs Medical Advice    Who Called: Pt      Would the patient rather a call back or a response via [facility]? Callback    Best Call Back Number: [phone]    Additional Information: Sts she would be in [place] for next week and would love to do her labs next week for her visit 8/10. Please put orders in the system. Please advise ---------------- Thank you.

## 2023-07-10 NOTE — TELEPHONE ENCOUNTER
Pt has appt 8/10/23, states she usually has lab work done prior to seeing you.  Upon chart review, she had lab work done on 2/23/23.  Does she need anything else prior to 8/10/23 appt?

## 2023-07-19 ENCOUNTER — LAB VISIT (OUTPATIENT)
Dept: LAB | Facility: HOSPITAL | Age: 78
End: 2023-07-19
Attending: INTERNAL MEDICINE
Payer: MEDICARE

## 2023-07-19 DIAGNOSIS — R80.9 TYPE 2 DIABETES MELLITUS WITH MICROALBUMINURIA, WITHOUT LONG-TERM CURRENT USE OF INSULIN: ICD-10-CM

## 2023-07-19 DIAGNOSIS — E11.29 TYPE 2 DIABETES MELLITUS WITH MICROALBUMINURIA, WITHOUT LONG-TERM CURRENT USE OF INSULIN: ICD-10-CM

## 2023-07-19 LAB
ALBUMIN SERPL BCP-MCNC: 3.5 G/DL (ref 3.5–5.2)
ALP SERPL-CCNC: 203 U/L (ref 55–135)
ALT SERPL W/O P-5'-P-CCNC: 6 U/L (ref 10–44)
ANION GAP SERPL CALC-SCNC: 11 MMOL/L (ref 8–16)
AST SERPL-CCNC: 12 U/L (ref 10–40)
BILIRUB SERPL-MCNC: 0.4 MG/DL (ref 0.1–1)
BUN SERPL-MCNC: 8 MG/DL (ref 8–23)
CALCIUM SERPL-MCNC: 9.5 MG/DL (ref 8.7–10.5)
CHLORIDE SERPL-SCNC: 102 MMOL/L (ref 95–110)
CO2 SERPL-SCNC: 26 MMOL/L (ref 23–29)
CREAT SERPL-MCNC: 0.8 MG/DL (ref 0.5–1.4)
EST. GFR  (NO RACE VARIABLE): >60 ML/MIN/1.73 M^2
ESTIMATED AVG GLUCOSE: 151 MG/DL (ref 68–131)
GLUCOSE SERPL-MCNC: 155 MG/DL (ref 70–110)
HBA1C MFR BLD: 6.9 % (ref 4–5.6)
POTASSIUM SERPL-SCNC: 4.4 MMOL/L (ref 3.5–5.1)
PROT SERPL-MCNC: 7.8 G/DL (ref 6–8.4)
SODIUM SERPL-SCNC: 139 MMOL/L (ref 136–145)

## 2023-07-19 PROCEDURE — 36415 COLL VENOUS BLD VENIPUNCTURE: CPT | Mod: PO | Performed by: INTERNAL MEDICINE

## 2023-07-19 PROCEDURE — 83036 HEMOGLOBIN GLYCOSYLATED A1C: CPT | Performed by: INTERNAL MEDICINE

## 2023-07-19 PROCEDURE — 80053 COMPREHEN METABOLIC PANEL: CPT | Performed by: INTERNAL MEDICINE

## 2023-08-04 RX ORDER — GLIPIZIDE 5 MG/1
TABLET, FILM COATED, EXTENDED RELEASE ORAL
Qty: 90 TABLET | Refills: 1 | Status: SHIPPED | OUTPATIENT
Start: 2023-08-04 | End: 2024-01-03

## 2023-08-04 NOTE — TELEPHONE ENCOUNTER
No care due was identified.  [facility] Embedded Care Due Messages. Reference number: [id number].   8/04/2023 12:11:34 AM CDT

## 2023-08-04 NOTE — TELEPHONE ENCOUNTER
Refill Decision Note   Shawanda Desir  is requesting a refill authorization.  Brief Assessment and Rationale for Refill:  Approve     Medication Therapy Plan:         Comments:     Note composed:10:13 AM 08/04/2023

## 2023-08-10 ENCOUNTER — OFFICE VISIT (OUTPATIENT)
Dept: FAMILY MEDICINE | Facility: CLINIC | Age: 78
End: 2023-08-10
Payer: MEDICARE

## 2023-08-10 ENCOUNTER — PATIENT OUTREACH (OUTPATIENT)
Dept: ADMINISTRATIVE | Facility: HOSPITAL | Age: 78
End: 2023-08-10
Payer: MEDICARE

## 2023-08-10 VITALS
DIASTOLIC BLOOD PRESSURE: 60 MMHG | WEIGHT: 185.44 LBS | OXYGEN SATURATION: 98 % | SYSTOLIC BLOOD PRESSURE: 110 MMHG | HEIGHT: 60 IN | BODY MASS INDEX: 36.4 KG/M2 | HEART RATE: 74 BPM

## 2023-08-10 DIAGNOSIS — E78.49 OTHER HYPERLIPIDEMIA: ICD-10-CM

## 2023-08-10 DIAGNOSIS — I10 ESSENTIAL HYPERTENSION: Primary | ICD-10-CM

## 2023-08-10 DIAGNOSIS — M79.673 PAIN OF FOOT, UNSPECIFIED LATERALITY: ICD-10-CM

## 2023-08-10 DIAGNOSIS — E11.29 TYPE 2 DIABETES MELLITUS WITH MICROALBUMINURIA, WITHOUT LONG-TERM CURRENT USE OF INSULIN: ICD-10-CM

## 2023-08-10 DIAGNOSIS — R80.9 TYPE 2 DIABETES MELLITUS WITH MICROALBUMINURIA, WITHOUT LONG-TERM CURRENT USE OF INSULIN: ICD-10-CM

## 2023-08-10 PROCEDURE — 1126F PR PAIN SEVERITY QUANTIFIED, NO PAIN PRESENT: ICD-10-PCS | Mod: CPTII,S$GLB,, | Performed by: INTERNAL MEDICINE

## 2023-08-10 PROCEDURE — 99214 OFFICE O/P EST MOD 30 MIN: CPT | Mod: S$GLB,,, | Performed by: INTERNAL MEDICINE

## 2023-08-10 PROCEDURE — 1159F PR MEDICATION LIST DOCUMENTED IN MEDICAL RECORD: ICD-10-PCS | Mod: CPTII,S$GLB,, | Performed by: INTERNAL MEDICINE

## 2023-08-10 PROCEDURE — 3074F SYST BP LT 130 MM HG: CPT | Mod: CPTII,S$GLB,, | Performed by: INTERNAL MEDICINE

## 2023-08-10 PROCEDURE — 99214 PR OFFICE/OUTPT VISIT, EST, LEVL IV, 30-39 MIN: ICD-10-PCS | Mod: S$GLB,,, | Performed by: INTERNAL MEDICINE

## 2023-08-10 PROCEDURE — 3078F PR MOST RECENT DIASTOLIC BLOOD PRESSURE < 80 MM HG: ICD-10-PCS | Mod: CPTII,S$GLB,, | Performed by: INTERNAL MEDICINE

## 2023-08-10 PROCEDURE — 99999 PR PBB SHADOW E&M-EST. PATIENT-LVL III: CPT | Mod: PBBFAC,,, | Performed by: INTERNAL MEDICINE

## 2023-08-10 PROCEDURE — 99999 PR PBB SHADOW E&M-EST. PATIENT-LVL III: ICD-10-PCS | Mod: PBBFAC,,, | Performed by: INTERNAL MEDICINE

## 2023-08-10 PROCEDURE — 1159F MED LIST DOCD IN RCRD: CPT | Mod: CPTII,S$GLB,, | Performed by: INTERNAL MEDICINE

## 2023-08-10 PROCEDURE — 3074F PR MOST RECENT SYSTOLIC BLOOD PRESSURE < 130 MM HG: ICD-10-PCS | Mod: CPTII,S$GLB,, | Performed by: INTERNAL MEDICINE

## 2023-08-10 PROCEDURE — 3078F DIAST BP <80 MM HG: CPT | Mod: CPTII,S$GLB,, | Performed by: INTERNAL MEDICINE

## 2023-08-10 PROCEDURE — 1126F AMNT PAIN NOTED NONE PRSNT: CPT | Mod: CPTII,S$GLB,, | Performed by: INTERNAL MEDICINE

## 2023-08-10 NOTE — PROGRESS NOTES
Population Health Chart Review & Patient Outreach Details:     Reason for Outreach Encounter:     [x]  Non-Compliant Report   []  Payor Report (Humana, PHN, BCBS, MSSP, MCIP, UHC, etc.)   []  Pre-Visit Chart Review     Updates Requested / Reviewed:     []  Care Everywhere    []     []  External Sources ([facility], [facility], DIS, etc.)   [x]  Care Team Updated    Patient Outreach Method:    []  Telephone Outreach Completed   [] Successful   [] Left Voicemail   [] Unable to Contact (wrong number, no voicemail)  []  [employer] Portal Outreach Sent  []  Letter Outreach Mailed  [x]  Fax Sent for External Records  []  External Records Upload    Health Maintenance Topics Addressed and Outreach Outcomes / Actions Taken:        []      Breast Cancer Screening []  Mammo Scheduled      []  External Records Requested     []  Added Reminder to Complete to Upcoming Primary Care Appt Notes     []  Patient Declined     []  Patient Will Call Back to Schedule     []  Patient Will Schedule with External Provider / Order Routed if Applicable             []       Cervical Cancer Screening []  Pap Scheduled      []  External Records Requested     []  Added Reminder to Complete to Upcoming Primary Care Appt Notes     []  Patient Declined     []  Patient Will Call Back to Schedule     []  Patient Will Schedule with External Provider               []          Colorectal Cancer Screening []  Colonoscopy Case Request or Referral Placed     []  External Records Requested     []  Added Reminder to Complete to Upcoming Primary Care Appt Notes     []  Patient Declined     []  Patient Will Call Back to Schedule     []  Patient Will Schedule with External Provider     []  Fit Kit Mailed (add the SmartPhrase under additional notes)     []  Reminded Patient to Complete Home Test             [x]      Diabetic Eye Exam []  Eye Camera Scheduled or Optometry Referral Placed     [x]  External Records Requested     []  Added Reminder to Complete  to Upcoming Primary Care Appt Notes     []  Patient Declined     []  Patient Will Call Back to Schedule     []  Patient Will Schedule with External Provider             []      Blood Pressure Control []  Primary Care Follow Up Visit Scheduled     []  Remote Blood Pressure Reading Captured     []  Added Reminder to Complete to Upcoming Primary Care Appt Notes     []  Patient Declined     []  Patient Will Call Back / Patient Will Send Portal Message with Reading     []  Patient Will Call Back to Schedule Provider Visit             []       HbA1c & Other Labs []  Lab Appt Scheduled for Due Labs     []  Primary Care Follow Up Visit Scheduled      []  Reminded Patient to Complete Home Test     []  Added Reminder to Complete to Upcoming Primary Care Appt Notes     []  Patient Declined     []  Patient Will Call Back to Schedule     []  Patient Will Schedule with External Provider / Order Routed if Applicable           []    Schedule Primary Care Appt []  Primary Care Appt Scheduled     []  Patient Declined     []  Patient Will Call Back to Schedule     []  Pt Established with External Provider & Updated Care Team             []      Medication Adherence []  Primary Care Appointment Scheduled     []  Added Reminder to Upcoming Primary Care Appt Notes     []  Patient Reminded to  Prescription     []  Patient Declined, Provider Notified if Needed     []  Sent Provider Message to Review and/or Add Exclusion to Problem List             []      Osteoporosis Screening []  DXA Appointment Scheduled     []  External Records Requested     []  Added Reminder to Complete to Upcoming Primary Care Appt Notes     []  Patient Declined     []  Patient Will Call Back to Schedule     []  Patient Will Schedule with External Provider / Order Routed if Applicable     Additional Care Coordinator Notes:         Further Action Needed If Patient Returns Outreach:     \

## 2023-08-10 NOTE — PROGRESS NOTES
Subjective     Patient ID: Shawanda Desir is a 77 y.o. female.    Chief Complaint: Follow-up and Foot Swelling (Happens when taking amplodipine 10mg)    Here for check up    Dm - currently only on glipizde 5 mg,  stoppd the metformin due to rash. A1c 6.9  Htn- stable on just olmesartan  Hyperlipidemia- stable on lipitor    Follow-up  Pertinent negatives include no chest pain, fever or headaches.   Review of Systems   Constitutional:  Negative for fever.   Respiratory:  Negative for shortness of breath.    Cardiovascular:  Negative for chest pain.   Neurological:  Negative for headaches.          Objective     Physical Exam  Constitutional:       Appearance: Normal appearance.   HENT:      Head: Normocephalic.   Cardiovascular:      Rate and Rhythm: Normal rate and regular rhythm.   Pulmonary:      Effort: Pulmonary effort is normal.      Breath sounds: Normal breath sounds.   Musculoskeletal:         General: Normal range of motion.      Cervical back: Normal range of motion.   Neurological:      General: No focal deficit present.      Mental Status: She is alert.   Psychiatric:         Mood and Affect: Mood normal.         Behavior: Behavior normal.            Assessment and Plan     1. Essential hypertension    2. Other hyperlipidemia    3. Type 2 diabetes mellitus with microalbuminuria, without long-term current use of insulin    4. Pain of foot, unspecified laterality  -     Ambulatory referral/consult to Podiatry; Future; Expected date: 08/17/2023        Dm- continue glipizdie, recheck a1c 6.9  Htn- stable on olmesartan  Hyperlipidemia- on lipitor 10 mg , stable          Follow up in about 3 months (around 11/10/2023).

## 2023-08-10 NOTE — LETTER
AUTHORIZATION FOR RELEASE OF   CONFIDENTIAL INFORMATION    Dear, [facility] [place] [facility]     We are seeing Shawanda Desir, date of birth 1945, in the clinic at [facility]. Vi Dubose MD is the patient's PCP. Shawanda Desir has an outstanding lab/procedure at the time we reviewed her chart. In order to help keep her health information updated, she has authorized us to request the following medical record(s):        (  )  MAMMOGRAM                                      (  )  COLONOSCOPY      (  )  PAP SMEAR                                          (  )  OUTSIDE LAB RESULTS     (  )  DEXA SCAN                                          ( X )  EYE EXAM            (  )  FOOT EXAM                                          (  )  ENTIRE RECORD     (  )  OUTSIDE IMMUNIZATIONS                 (  )  _______________         Please fax records to [facility], Nicaud, Elise J., MD, [phone]     If you have any questions, please contact [facility]          Patient Name: Shawanda Desir  : 1945  Patient Phone #: [phone]

## 2023-08-14 NOTE — TELEPHONE ENCOUNTER
Left message for pt to return call.   Detail Level: Zone Hide Include Location In Plan Question?: No Additional Note: If lesions become symptomatic treatment options of liquid nitrogen vs shave removal were discussed. Benign nature reviewed. Risks & benefits of cryosurgery discussed, including incomplete treatment, scar, blistering, and dyspigmentation. Risks vs Benefits and cautions of removal discussed with patient. These are including but not limited to : Scar, infection, poor cosmetic outcome and recurrence. Additional Note: Patient to observe site for changes. Discussed the importance of monthly self skin checks and routine skin exams. Discussed the importance of daily sun protection- SPF 35 or higher.

## 2023-08-29 RX ORDER — OLMESARTAN MEDOXOMIL 40 MG/1
40 TABLET ORAL DAILY
Qty: 90 TABLET | Refills: 3 | Status: SHIPPED | OUTPATIENT
Start: 2023-08-29 | End: 2024-05-06 | Stop reason: SDUPTHER

## 2023-08-29 NOTE — TELEPHONE ENCOUNTER
Care Due:                  Date            Visit Type   Department     Provider  --------------------------------------------------------------------------------                                EP -                              PRIMARY      [facility]  Last Visit: 08-      CARE ([facility])   MEDICINE       Vi Dubose                              ESTABLISHED   [facility]  Next Visit: 11-      PATIENT      MEDICINE       Vi Dubose                                                            Last  Test          Frequency    Reason                     Performed    Due Date  --------------------------------------------------------------------------------    Uric Acid...  12 months..  allopurinoL..............  Not Found    Overdue    [employer] Embedded Care Due Messages. Reference number: [id number].   8/29/2023 11:43:28 AM CDT

## 2023-08-29 NOTE — TELEPHONE ENCOUNTER
Provider Staff:  Action required for this patient     Please see care gap opportunities below in Care Due Message: Lab (uric acid) outdated.     Thanks!  [facility]     Appointments     Last Visit   8/10/2023 Vi Dubose MD   Next Visit   11/14/2023 Vi Dubose MD       Refill Decision Note   Shawanda Desir  is requesting a refill authorization.  Brief Assessment and Rationale for Refill:  Approve       Medication Therapy Plan:  Lab (uric acid) outdated      Comments:   Note composed:4:20 PM 08/29/2023

## 2023-08-29 NOTE — TELEPHONE ENCOUNTER
----- Message from Nuris Burrows sent at 8/29/2023 11:26 AM CDT -----  Type:  RX Refill Request    Who Called:  pt  Refill or New Rx:  Refill  RX Name and Strength:  olmesartan (BENICAR) 40 MG tablet  How is the patient currently taking it? (ex. 1XDay):  As directed  Is this a 30 day or 90 day RX:  90  Preferred Pharmacy with phone number:    [id number] [place] - [address]  [address]  [place] [address]  Phone: [phone] Fax: [phone]  Local or Mail Order:  Local  Ordering Provider:  Sedrick Hernandez Call Back Number:  [phone]  Additional Information:  pl call bk to advise thanks

## 2023-09-08 DIAGNOSIS — Z96.652 S/P TOTAL KNEE ARTHROPLASTY, LEFT: Primary | ICD-10-CM

## 2023-09-12 ENCOUNTER — HOSPITAL ENCOUNTER (OUTPATIENT)
Dept: RADIOLOGY | Facility: HOSPITAL | Age: 78
Discharge: HOME | End: 2023-09-12
Attending: NURSE PRACTITIONER
Payer: MEDICARE

## 2023-09-12 ENCOUNTER — OFFICE VISIT (OUTPATIENT)
Dept: ORTHOPEDICS | Facility: CLINIC | Age: 78
End: 2023-09-12
Payer: MEDICARE

## 2023-09-12 VITALS — HEIGHT: 60 IN | WEIGHT: 185.44 LBS | BODY MASS INDEX: 36.4 KG/M2

## 2023-09-12 DIAGNOSIS — Z96.652 S/P TOTAL KNEE ARTHROPLASTY, LEFT: Primary | ICD-10-CM

## 2023-09-12 DIAGNOSIS — Z96.652 S/P TOTAL KNEE ARTHROPLASTY, LEFT: ICD-10-CM

## 2023-09-12 PROCEDURE — 3288F PR FALLS RISK ASSESSMENT DOCUMENTED: ICD-10-PCS | Mod: CPTII,S$GLB,, | Performed by: NURSE PRACTITIONER

## 2023-09-12 PROCEDURE — 1126F AMNT PAIN NOTED NONE PRSNT: CPT | Mod: CPTII,S$GLB,, | Performed by: NURSE PRACTITIONER

## 2023-09-12 PROCEDURE — 73560 XR KNEE ORTHO LEFT: ICD-10-PCS | Mod: 26,RT,, | Performed by: RADIOLOGY

## 2023-09-12 PROCEDURE — 73562 X-RAY EXAM OF KNEE 3: CPT | Mod: TC,PO,LT

## 2023-09-12 PROCEDURE — 1126F PR PAIN SEVERITY QUANTIFIED, NO PAIN PRESENT: ICD-10-PCS | Mod: CPTII,S$GLB,, | Performed by: NURSE PRACTITIONER

## 2023-09-12 PROCEDURE — 73560 X-RAY EXAM OF KNEE 1 OR 2: CPT | Mod: 26,RT,, | Performed by: RADIOLOGY

## 2023-09-12 PROCEDURE — 73562 XR KNEE ORTHO LEFT: ICD-10-PCS | Mod: 26,LT,, | Performed by: RADIOLOGY

## 2023-09-12 PROCEDURE — 99213 PR OFFICE/OUTPT VISIT, EST, LEVL III, 20-29 MIN: ICD-10-PCS | Mod: S$GLB,,, | Performed by: NURSE PRACTITIONER

## 2023-09-12 PROCEDURE — 3288F FALL RISK ASSESSMENT DOCD: CPT | Mod: CPTII,S$GLB,, | Performed by: NURSE PRACTITIONER

## 2023-09-12 PROCEDURE — 1101F PR PT FALLS ASSESS DOC 0-1 FALLS W/OUT INJ PAST YR: ICD-10-PCS | Mod: CPTII,S$GLB,, | Performed by: NURSE PRACTITIONER

## 2023-09-12 PROCEDURE — 1160F RVW MEDS BY RX/DR IN RCRD: CPT | Mod: CPTII,S$GLB,, | Performed by: NURSE PRACTITIONER

## 2023-09-12 PROCEDURE — 73562 X-RAY EXAM OF KNEE 3: CPT | Mod: 26,LT,, | Performed by: RADIOLOGY

## 2023-09-12 PROCEDURE — 1160F PR REVIEW ALL MEDS BY PRESCRIBER/CLIN PHARMACIST DOCUMENTED: ICD-10-PCS | Mod: CPTII,S$GLB,, | Performed by: NURSE PRACTITIONER

## 2023-09-12 PROCEDURE — 1159F MED LIST DOCD IN RCRD: CPT | Mod: CPTII,S$GLB,, | Performed by: NURSE PRACTITIONER

## 2023-09-12 PROCEDURE — 1159F PR MEDICATION LIST DOCUMENTED IN MEDICAL RECORD: ICD-10-PCS | Mod: CPTII,S$GLB,, | Performed by: NURSE PRACTITIONER

## 2023-09-12 PROCEDURE — 99999 PR PBB SHADOW E&M-EST. PATIENT-LVL III: ICD-10-PCS | Mod: PBBFAC,,, | Performed by: NURSE PRACTITIONER

## 2023-09-12 PROCEDURE — 99999 PR PBB SHADOW E&M-EST. PATIENT-LVL III: CPT | Mod: PBBFAC,,, | Performed by: NURSE PRACTITIONER

## 2023-09-12 PROCEDURE — 99213 OFFICE O/P EST LOW 20 MIN: CPT | Mod: S$GLB,,, | Performed by: NURSE PRACTITIONER

## 2023-09-12 PROCEDURE — 1101F PT FALLS ASSESS-DOCD LE1/YR: CPT | Mod: CPTII,S$GLB,, | Performed by: NURSE PRACTITIONER

## 2023-09-12 NOTE — PROGRESS NOTES
Chief Complaint   Patient presents with    Left Knee - Pain       HPI:   This is a 77 y.o. who returns to clinic today in follow-up s/p left total knee arthroplasty close to 6 months ago. She denies any pain and is doing well. No numbness or tingling. No associated signs or symptoms.    Past Medical History:   Diagnosis Date    Anticoagulant long-term use     Arthritis     Diabetes mellitus     Hyperlipidemia     Hypertension      Past Surgical History:   Procedure Laterality Date    BACK SURGERY      lumbar fusion    BREAST BIOPSY Right     neg--core    ROBOTIC ARTHROPLASTY, KNEE Right 08/29/2022    Procedure: ROBOTIC ARTHROPLASTY, KNEE, TOTAL ATTEMPTED,CONVERTED TO OPEN;  Surgeon: Matthew Patel MD;  Location: [facility] OR;  Service: Orthopedics;  Laterality: Right;    ROBOTIC ARTHROPLASTY, KNEE Left 3/13/2023    Procedure: ROBOTIC ARTHROPLASTY, KNEE, TOTAL - [facility];  Surgeon: Matthew Patel MD;  Location: [facility] OR;  Service: Orthopedics;  Laterality: Left;    TRACHEOSTOMY      age 14--unwashed strawberries- reaction to a chemical-- still eats strawberries     Current Outpatient Medications on File Prior to Visit   Medication Sig Dispense Refill    acetaminophen (TYLENOL) 500 MG tablet Take 2 tablets (1,000 mg total) by mouth every 8 (eight) hours. 90 tablet 0    allopurinoL (ZYLOPRIM) 100 MG tablet Take 1 tablet (100 mg total) by mouth once daily. 90 tablet 0    aspirin (ECOTRIN) 81 MG EC tablet Take 81 mg by mouth once daily.      atorvastatin (LIPITOR) 10 MG tablet TAKE 1 TABLET BY MOUTH EVERY DAY IN THE EVENING 90 tablet 2    glipiZIDE 5 MG TR24 TAKE 1 TABLET BY MOUTH EVERY DAY WITH BREAKFAST 90 tablet 1    olmesartan (BENICAR) 40 MG tablet Take 1 tablet (40 mg total) by mouth once daily. 90 tablet 3    triamcinolone acetonide 0.1% (KENALOG) 0.1 % Lotn Apply topically 2 (two) times daily as needed. 60 mL 5     No current facility-administered medications on file prior to visit.     Review of patient's allergies  indicates:   Allergen Reactions    Clonidine      Causes chest tightness and leg swelling    Tradjenta [linagliptin]      Chest tightness and leg swelling     Family History   Problem Relation Age of Onset    Cancer Mother     Cancer Father      Social History     Socioeconomic History    Marital status:    Tobacco Use    Smoking status: Never    Smokeless tobacco: Never   Substance and Sexual Activity    Alcohol use: No    Drug use: No     Social Determinants of Health     Financial Resource Strain: Low Risk  (10/17/2022)    Overall Financial Resource Strain (CARDIA)     Difficulty of Paying Living Expenses: Not hard at all   Food Insecurity: No Food Insecurity (10/17/2022)    Hunger Vital Sign     Worried About Running Out of Food in the Last Year: Never true     Ran Out of Food in the Last Year: Never true   Transportation Needs: No Transportation Needs (10/17/2022)    PRAPARE - Transportation     Lack of Transportation (Medical): No     Lack of Transportation (Non-Medical): No   Physical Activity: Insufficiently Active (10/17/2022)    Exercise Vital Sign     Days of Exercise per Week: 3 days     Minutes of Exercise per Session: 40 min   Stress: No Stress Concern Present (10/17/2022)    [employer] [place] [employer] - Occupational Stress Questionnaire     Feeling of Stress : Not at all   Social Connections: Moderately Integrated (10/17/2022)    Social Connection and Isolation Panel [NHANES]     Frequency of Communication with Friends and Family: More than three times a week     Frequency of Social Gatherings with Friends and Family: Once a week     Attends Sikh Services: More than 4 times per year     Active Member of Clubs or Organizations: Yes     Attends Club or Organization Meetings: More than 4 times per year     Marital Status:    Housing Stability: Unknown (10/17/2022)    Housing Stability Vital Sign     Unable to Pay for Housing in the Last Year: No     Unstable Housing in  the Last Year: No       Review of Systems:  Constitutional:  Denies fever or chills   Eyes:  Denies change in visual acuity   HENT:  Denies nasal congestion or sore throat   Respiratory:  Denies cough or shortness of breath   Cardiovascular:  Denies chest pain or edema   GI:  Denies abdominal pain, nausea, vomiting, bloody stools or diarrhea   :  Denies dysuria   Integument:  Denies rash   Neurologic:  Denies headache, focal weakness or sensory changes   Endocrine:  Denies polyuria or polydipsia   Lymphatic:  Denies swollen glands   Psychiatric:  Denies depression or anxiety     Physical Exam:   Constitutional:  Well developed, well nourished, no acute distress, non-toxic appearance   Integument:  Well hydrated  Neurologic:  Alert & oriented x 3  Psychiatric:  Speech and behavior appropriate     Bilateral Knee Exam    left Knee Exam   Tenderness   The patient is experiencing no tenderness in the medial joint line.    Range of Motion   ROM 0-125 bilaterally    Muscle Strength   The patient has normal bilateral knee strength.    Tests   Aide:  Medial - positive   Lachman:  Anterior - negative      Varus: negative  Valgus: negative  Patellar Apprehension: negative      Other   Incision healed. No redness.   Erythema: absent  Sensation: normal  Pulse: present  Swelling: mild    bilateral Knee exam   Knee exam performed same as contralateral side and is normal                    X-rays were performed today, personally reviewed by me and findings discussed with the patient.  3 views of the left knee show implants intact without any evidence of loosening.       S/P total knee arthroplasty, left       She is doing well.   Denies pain.   She has completed PT.   Return to clinic at her 1 year post op or as needed.

## 2023-09-20 ENCOUNTER — OFFICE VISIT (OUTPATIENT)
Dept: PODIATRY | Facility: CLINIC | Age: 78
End: 2023-09-20
Payer: MEDICARE

## 2023-09-20 VITALS — WEIGHT: 185 LBS | BODY MASS INDEX: 36.32 KG/M2 | HEIGHT: 60 IN

## 2023-09-20 DIAGNOSIS — M72.2 PLANTAR FASCIITIS OF LEFT FOOT: ICD-10-CM

## 2023-09-20 DIAGNOSIS — M21.6X2 ACQUIRED EQUINUS DEFORMITY OF BOTH FEET: Primary | ICD-10-CM

## 2023-09-20 DIAGNOSIS — M21.6X1 ACQUIRED EQUINUS DEFORMITY OF BOTH FEET: Primary | ICD-10-CM

## 2023-09-20 PROCEDURE — 20550 NJX 1 TENDON SHEATH/LIGAMENT: CPT | Mod: LT,S$GLB,, | Performed by: PODIATRIST

## 2023-09-20 PROCEDURE — 1125F AMNT PAIN NOTED PAIN PRSNT: CPT | Mod: CPTII,S$GLB,, | Performed by: PODIATRIST

## 2023-09-20 PROCEDURE — 99999 PR PBB SHADOW E&M-EST. PATIENT-LVL III: ICD-10-PCS | Mod: PBBFAC,,, | Performed by: PODIATRIST

## 2023-09-20 PROCEDURE — 99203 OFFICE O/P NEW LOW 30 MIN: CPT | Mod: 25,S$GLB,, | Performed by: PODIATRIST

## 2023-09-20 PROCEDURE — 99999 PR PBB SHADOW E&M-EST. PATIENT-LVL III: CPT | Mod: PBBFAC,,, | Performed by: PODIATRIST

## 2023-09-20 PROCEDURE — 1159F PR MEDICATION LIST DOCUMENTED IN MEDICAL RECORD: ICD-10-PCS | Mod: CPTII,S$GLB,, | Performed by: PODIATRIST

## 2023-09-20 PROCEDURE — 20550 PR INJECT TENDON SHEATH/LIGAMENT: ICD-10-PCS | Mod: LT,S$GLB,, | Performed by: PODIATRIST

## 2023-09-20 PROCEDURE — 1101F PR PT FALLS ASSESS DOC 0-1 FALLS W/OUT INJ PAST YR: ICD-10-PCS | Mod: CPTII,S$GLB,, | Performed by: PODIATRIST

## 2023-09-20 PROCEDURE — 3288F PR FALLS RISK ASSESSMENT DOCUMENTED: ICD-10-PCS | Mod: CPTII,S$GLB,, | Performed by: PODIATRIST

## 2023-09-20 PROCEDURE — 99203 PR OFFICE/OUTPT VISIT, NEW, LEVL III, 30-44 MIN: ICD-10-PCS | Mod: 25,S$GLB,, | Performed by: PODIATRIST

## 2023-09-20 PROCEDURE — 1160F PR REVIEW ALL MEDS BY PRESCRIBER/CLIN PHARMACIST DOCUMENTED: ICD-10-PCS | Mod: CPTII,S$GLB,, | Performed by: PODIATRIST

## 2023-09-20 PROCEDURE — 3288F FALL RISK ASSESSMENT DOCD: CPT | Mod: CPTII,S$GLB,, | Performed by: PODIATRIST

## 2023-09-20 PROCEDURE — 1125F PR PAIN SEVERITY QUANTIFIED, PAIN PRESENT: ICD-10-PCS | Mod: CPTII,S$GLB,, | Performed by: PODIATRIST

## 2023-09-20 PROCEDURE — 1101F PT FALLS ASSESS-DOCD LE1/YR: CPT | Mod: CPTII,S$GLB,, | Performed by: PODIATRIST

## 2023-09-20 PROCEDURE — 1160F RVW MEDS BY RX/DR IN RCRD: CPT | Mod: CPTII,S$GLB,, | Performed by: PODIATRIST

## 2023-09-20 PROCEDURE — 1159F MED LIST DOCD IN RCRD: CPT | Mod: CPTII,S$GLB,, | Performed by: PODIATRIST

## 2023-09-20 RX ORDER — METHYLPREDNISOLONE ACETATE 40 MG/ML
40 INJECTION, SUSPENSION INTRA-ARTICULAR; INTRALESIONAL; INTRAMUSCULAR; SOFT TISSUE
Status: COMPLETED | OUTPATIENT
Start: 2023-09-20 | End: 2023-09-20

## 2023-09-20 RX ADMIN — METHYLPREDNISOLONE ACETATE 40 MG: 40 INJECTION, SUSPENSION INTRA-ARTICULAR; INTRALESIONAL; INTRAMUSCULAR; SOFT TISSUE at 09:09

## 2023-09-20 NOTE — PROGRESS NOTES
Subjective:      Patient ID: Shawanda Desir is a 77 y.o. female.    Chief Complaint: Heel Pain (Left heel)    Shawanda is a 77 y.o. female who presents to the clinic complaining of heel pain in the left foot, especially with the first step in the morning or after rest. The pain is described as Aching and Sharp. The onset of the pain was gradual and has worsened over the past several weeks. Shawanda rates the pain as 9/10. She denies a history of trauma. Prior treatments include rest and changing her shoes.    Review of Systems   Constitutional: Negative for chills and fever.   Cardiovascular:  Negative for claudication and leg swelling.   Respiratory:  Negative for shortness of breath.    Skin:  Negative for itching, nail changes and rash.   Musculoskeletal:  Negative for muscle cramps, muscle weakness and myalgias.        Left heel pain   Gastrointestinal:  Negative for nausea and vomiting.   Neurological:  Negative for focal weakness, loss of balance, numbness and paresthesias.           Objective:      Physical Exam  Constitutional:       General: She is not in acute distress.     Appearance: She is well-developed. She is not diaphoretic.   Cardiovascular:      Pulses:           Dorsalis pedis pulses are 2+ on the right side and 2+ on the left side.        Posterior tibial pulses are 2+ on the right side and 2+ on the left side.      Comments: < 3 sec capillary refill time to toes 1-5 bilateral. Toes and feet are warm to touch proximally with normal distal cooling b/l. There is some hair growth on the feet and toes b/l. There is no edema b/l. No spider veins or varicosities present b/l.     Musculoskeletal:      Comments: Equinus noted b/l ankles with < 5 deg DF noted. MMT 5/5 in DF/PF/Inv/Ev resistance with no reproduction of pain in any direction. Passive range of motion of ankle and pedal joints is painless b/l.    Pain on palpation plantar medial and central heel. No pain with ROM or MMT. No pain with medial  and lateral compression of heel.     Skin:     General: Skin is warm and dry.      Coloration: Skin is not pale.      Findings: No abrasion, bruising, burn, ecchymosis, erythema, laceration, lesion, petechiae or rash.      Nails: There is no clubbing.      Comments: Skin temperature, texture and turgor within normal limits.   Neurological:      Mental Status: She is alert and oriented to person, place, and time.      Sensory: No sensory deficit.      Motor: No tremor, atrophy or abnormal muscle tone.      Comments: Negative tinel sign bilateral.   Psychiatric:         Behavior: Behavior normal.               Assessment:       Encounter Diagnoses   Name Primary?    Plantar fasciitis of left foot     Acquired equinus deformity of both feet Yes         Plan:       Shawanda was seen today for heel pain.    Diagnoses and all orders for this visit:    Acquired equinus deformity of both feet    Plantar fasciitis of left foot  -     Ambulatory referral/consult to Podiatry    Other orders  -     methylPREDNISolone acetate injection 40 mg      I counseled the patient on her conditions, their implications and medical management.    Patient will obtain over the counter arch supports and wear them in shoes whenever possible.  Athletic shoes intended for walking or running are usually best.    Patient will stretch the tendo achilles complex three times daily as demonstrated in the office.  Literature was dispensed illustrating proper stretching technique.    Avoid barefoot or flats    Patient instructed on adequate icing techniques. Patient should ice the affected area at least once per day x 10 minutes for 10 days . I advised the  patient that extra icing would also be beneficial to ensure adequate anti inflammatory effect     Conservative vs surgical treatment options for Plantar Fasciitis were explained in detail. Today the patient received an injection in plantar left heel. The area was prepped with alcohol, skin anesthestized  with cold spray and a mixture of 40 mg Depomedrol 1 mL 1% plain lidocaine was injected. The patient tolerated the procedure well. Instructed to rest, ice and elevate.    Return in 6 weeks for follow up    Jan Howard DPM

## 2023-09-20 NOTE — PATIENT INSTRUCTIONS
1. Stretch calf and plantar fascia at least 3x per day for 30 sec and before getting out of bed.    2. Supportive shoes at all times (athletic shoe including dixon, new balance, asics, HOKA or casual shoes like Dansko, Oksana, Naot, Vionoic, Fit flop  clog or wedge with extra heel padding and arch support. For house slippers would recommend Fitflop or Spenco found on amazon.com, Never walk barefoot or in flats.    (Varsity sports, Phidippides, [place] running [employer], Masseys, Goodfeet, Cantilever, Feet First, Foot Solutions, Therapeutic shoes, SAS, [facility] [employer] [facility] pro shop) http://www.itzat.[employer]/    3. Orthotic (recommend the following brands: Superfeet, Spenco, Powerstep, Sof Sole Fit Series)            5. ICE massage with frozen water bottle 1-2x per day for 30 minutes.    6. Consider night splint, custom orthotics, therapy and/or steroid injection    What Is Plantar Fasciitis?   The plantar fascia is a ligament-like band running from your heel to the ball of your foot. This band pulls on the heel bone, raising the arch of your foot as it pushes off the ground. But if your foot moves incorrectly, the plantar fascia may become strained. The fascia may swell and its tiny fibers may begin to fray, causing plantar fasciitis.  Causes  Plantar fasciitis is often caused by poor foot mechanics. If your foot flattens too much, the fascia may overstretch and swell. If your foot flattens too little, the fascia may ache from being pulled too tight.    The plantar fascia is a thick, fibrous layer of tissue that covers the bones on the bottom of your foot. It holds the foot bones in an arched position. Plantar fasciitis is a painful swelling of the plantar fascia.  A heel spur is an overgrowth of bone where the plantar fascia attaches to the heel bone. The heel spur itself usually doesnt cause pain. However, the heel spur might be a sign of plantar fasciitis which may cause your foot pain. There is no specific  treatment for heel spurs.   Plantar fasciitis can develop slowly or suddenly. It usually affects one foot at a time. Heel pain can feel sharp, like a knife sticking into the bottom of your foot. You may feel pain after exercising, long-distance jogging, stair climbing, long periods of standing, or after standing up.  Risk factors for plantar fasciitis include: arthritis, diabetes, obesity or recent weight gain, flat foot, and having high arches. Wearing high heels, loose shoes, or shoes with poor arch support adds to the risk.    Foot pain is usually worse in the morning. But it improves with walking. By the end of the day there may be a dull aching. Treatment includes short-term rest and controlling inflammation. It may take up to 9 months before all symptoms go away. In rare cases, a steroid injection in the foot, or surgery, may be needed.  Home care  If you are overweight, lose weight to help healing.  Choose supportive shoes with good arch support and shock absorbency. Replace athletic shoes when they become worn out. Dont walk or run barefoot.  Premade or custom-fitted shoe inserts may be helpful. Inserts made of silicone seem to be the most effective. Custom-made inserts can be provided by a podiatrist or foot specialist, physical therapist, or orthopedist.  Premade or custom-made night splints keep the heel stretched out while you sleep. They may prevent morning pain.  Avoid activities that stress the feet: jogging, prolonged standing or walking, contact sports, etc.  First thing in the morning and before sports, stretch the bottom of your foot. Gently flex your ankle so the toes move toward your knee.  Icing may help control heel pain. Apply an ice pack to the heel for 10-20 minutes as a preventive. Or ice your heel after a severe flare-up of symptoms. You may repeat this every 1-2 hours as needed.  You may use over-the-counter pain medicine to control pain, unless another medicine was prescribed.  Anti-inflammatory pain medicines, such as ibuprofen or naproxen, may work better than acetaminophen. If you have chronic liver or kidney disease or ever had a stomach ulcer or GI bleeding, talk with your healthcare provider before using these medicines.  Shoe inserts, a night splint, or a special boot may be needed. Use these as directed by your healthcare provider.      Treating Plantar Fasciitis    First, your doctor relieves pain. Then, the cause of your problem may be found and corrected. If your pain is due to poor foot mechanics, custom-made shoe inserts (orthoses) may help.        Reduce Symptoms:  To relieve mild symptoms, try aspirin, ibuprofen, or other medications as directed. Rubbing ice on the affected area may also help.  To reduce severe pain and swelling, your doctor may prescribe pills or injections or a walking cast in some instances. Physical therapy, such as ultrasound or a daily stretching program, may also be recommended. Surgery is rarely required.  To reduce symptoms caused by poor foot mechanics, your foot may be taped. This supports the arch and temporarily controls movement. Night splints may also help by stretching the fascia.    Control Movement  If taping helps, your doctor may prescribe orthoses. Built from plaster casts of your feet, these inserts control the way your foot moves. As a result, your symptoms should go away.  If Surgery Is Needed  Your doctor may consider surgery if other types of treatment don't control your pain. During surgery, the plantar fascia is partially cut to release tension. As you heal, fibrous tissue fills the space between the heel bone and the plantar fascia.   Reduce Overuse  Every time your foot strikes the ground, the plantar fascia is stretched. You can reduce the strain on the plantar fascia and the possibility of overuse by following these suggestions:  Lose any excess weight.  Avoid running on hard or uneven ground.  Use orthoses at all times in  your shoes and house slippers.  © 0560-9944 [employer]. [address] [place] [address]. All rights reserved. This information is not intended as a substitute for professional medical care. Always follow your healthcare professional's instructions.            _                Lower Body Exercises: Calf Stretch    This exercise both stretches and strengthens your lower body to help your back. Do the exercise as often as suggested by your health care provider. As you work out, dont rush or strain. Use an exercise mat, pillow, or folded towel to protect your knees and other sensitive areas.  Face a wall 2 feet away. Step toward the wall with one foot.  Place both palms on the wall and bend your front knee.  Lean forward, keeping the back leg straight and the heel on the floor.  Hold for 20 seconds. Switch legs.  © 6762-4267 [employer]. [address] [place] [address]. All rights reserved. This information is not intended as a substitute for professional medical care. Always follow your healthcare professional's instructions.    These instructions are for your right foot. Switch sides for your left foot.  Sit in a chair. Rest your right ankle on your left knee.  Hold your toes with your right hand. Gently bend the toes backward. Feel a stretch in the undersides of the toes and ball of the foot. Hold for 30 to 60 seconds.  Then gently bend the toes in the other direction. Gently press on them until your foot is pointed. Hold for 30 to 60 seconds.  Repeat 5 times, or as instructed.  Date Last Reviewed: 5/1/2016  © 4113-9771 [employer]. [address] [place] [address]. All rights reserved. This information is not intended as a substitute for professional medical care. Always follow your healthcare professional's instructions.

## 2023-11-03 ENCOUNTER — LAB VISIT (OUTPATIENT)
Dept: LAB | Facility: HOSPITAL | Age: 78
End: 2023-11-03
Attending: INTERNAL MEDICINE
Payer: MEDICARE

## 2023-11-03 DIAGNOSIS — E11.29 TYPE 2 DIABETES MELLITUS WITH MICROALBUMINURIA, WITHOUT LONG-TERM CURRENT USE OF INSULIN: ICD-10-CM

## 2023-11-03 DIAGNOSIS — R80.9 TYPE 2 DIABETES MELLITUS WITH MICROALBUMINURIA, WITHOUT LONG-TERM CURRENT USE OF INSULIN: ICD-10-CM

## 2023-11-03 LAB
ESTIMATED AVG GLUCOSE: 169 MG/DL (ref 68–131)
HBA1C MFR BLD: 7.5 % (ref 4–5.6)

## 2023-11-03 PROCEDURE — 83036 HEMOGLOBIN GLYCOSYLATED A1C: CPT | Performed by: INTERNAL MEDICINE

## 2023-11-03 PROCEDURE — 36415 COLL VENOUS BLD VENIPUNCTURE: CPT | Mod: PO | Performed by: INTERNAL MEDICINE

## 2023-11-14 ENCOUNTER — CLINICAL SUPPORT (OUTPATIENT)
Dept: AUDIOLOGY | Facility: CLINIC | Age: 78
End: 2023-11-14
Payer: MEDICARE

## 2023-11-14 ENCOUNTER — TELEPHONE (OUTPATIENT)
Dept: FAMILY MEDICINE | Facility: CLINIC | Age: 78
End: 2023-11-14

## 2023-11-14 ENCOUNTER — OFFICE VISIT (OUTPATIENT)
Dept: FAMILY MEDICINE | Facility: CLINIC | Age: 78
End: 2023-11-14
Payer: MEDICARE

## 2023-11-14 VITALS
SYSTOLIC BLOOD PRESSURE: 112 MMHG | DIASTOLIC BLOOD PRESSURE: 72 MMHG | OXYGEN SATURATION: 96 % | BODY MASS INDEX: 36.52 KG/M2 | HEART RATE: 94 BPM | WEIGHT: 187 LBS

## 2023-11-14 DIAGNOSIS — E78.49 OTHER HYPERLIPIDEMIA: ICD-10-CM

## 2023-11-14 DIAGNOSIS — R80.9 TYPE 2 DIABETES MELLITUS WITH MICROALBUMINURIA, WITHOUT LONG-TERM CURRENT USE OF INSULIN: ICD-10-CM

## 2023-11-14 DIAGNOSIS — I10 ESSENTIAL HYPERTENSION: Primary | ICD-10-CM

## 2023-11-14 DIAGNOSIS — E11.29 TYPE 2 DIABETES MELLITUS WITH MICROALBUMINURIA, WITHOUT LONG-TERM CURRENT USE OF INSULIN: ICD-10-CM

## 2023-11-14 DIAGNOSIS — H91.90 HEARING LOSS, UNSPECIFIED HEARING LOSS TYPE, UNSPECIFIED LATERALITY: ICD-10-CM

## 2023-11-14 DIAGNOSIS — H90.3 SENSORINEURAL HEARING LOSS (SNHL) OF BOTH EARS: Primary | ICD-10-CM

## 2023-11-14 PROCEDURE — 1101F PR PT FALLS ASSESS DOC 0-1 FALLS W/OUT INJ PAST YR: ICD-10-PCS | Mod: CPTII,S$GLB,, | Performed by: INTERNAL MEDICINE

## 2023-11-14 PROCEDURE — 99214 PR OFFICE/OUTPT VISIT, EST, LEVL IV, 30-39 MIN: ICD-10-PCS | Mod: S$GLB,,, | Performed by: INTERNAL MEDICINE

## 2023-11-14 PROCEDURE — 1126F PR PAIN SEVERITY QUANTIFIED, NO PAIN PRESENT: ICD-10-PCS | Mod: CPTII,S$GLB,, | Performed by: INTERNAL MEDICINE

## 2023-11-14 PROCEDURE — 99999 PR PBB SHADOW E&M-EST. PATIENT-LVL I: CPT | Mod: PBBFAC,,, | Performed by: AUDIOLOGIST

## 2023-11-14 PROCEDURE — 99999 PR PBB SHADOW E&M-EST. PATIENT-LVL III: ICD-10-PCS | Mod: PBBFAC,,, | Performed by: INTERNAL MEDICINE

## 2023-11-14 PROCEDURE — 99999 PR PBB SHADOW E&M-EST. PATIENT-LVL I: ICD-10-PCS | Mod: PBBFAC,,, | Performed by: AUDIOLOGIST

## 2023-11-14 PROCEDURE — 92557 COMPREHENSIVE HEARING TEST: CPT | Mod: S$GLB,,, | Performed by: AUDIOLOGIST

## 2023-11-14 PROCEDURE — 99999 PR PBB SHADOW E&M-EST. PATIENT-LVL III: CPT | Mod: PBBFAC,,, | Performed by: INTERNAL MEDICINE

## 2023-11-14 PROCEDURE — 3288F FALL RISK ASSESSMENT DOCD: CPT | Mod: CPTII,S$GLB,, | Performed by: INTERNAL MEDICINE

## 2023-11-14 PROCEDURE — 3074F SYST BP LT 130 MM HG: CPT | Mod: CPTII,S$GLB,, | Performed by: INTERNAL MEDICINE

## 2023-11-14 PROCEDURE — 1126F AMNT PAIN NOTED NONE PRSNT: CPT | Mod: CPTII,S$GLB,, | Performed by: INTERNAL MEDICINE

## 2023-11-14 PROCEDURE — 1101F PT FALLS ASSESS-DOCD LE1/YR: CPT | Mod: CPTII,S$GLB,, | Performed by: INTERNAL MEDICINE

## 2023-11-14 PROCEDURE — 1159F PR MEDICATION LIST DOCUMENTED IN MEDICAL RECORD: ICD-10-PCS | Mod: CPTII,S$GLB,, | Performed by: INTERNAL MEDICINE

## 2023-11-14 PROCEDURE — 92567 TYMPANOMETRY: CPT | Mod: S$GLB,,, | Performed by: AUDIOLOGIST

## 2023-11-14 PROCEDURE — 3074F PR MOST RECENT SYSTOLIC BLOOD PRESSURE < 130 MM HG: ICD-10-PCS | Mod: CPTII,S$GLB,, | Performed by: INTERNAL MEDICINE

## 2023-11-14 PROCEDURE — 3078F PR MOST RECENT DIASTOLIC BLOOD PRESSURE < 80 MM HG: ICD-10-PCS | Mod: CPTII,S$GLB,, | Performed by: INTERNAL MEDICINE

## 2023-11-14 PROCEDURE — 3288F PR FALLS RISK ASSESSMENT DOCUMENTED: ICD-10-PCS | Mod: CPTII,S$GLB,, | Performed by: INTERNAL MEDICINE

## 2023-11-14 PROCEDURE — 99214 OFFICE O/P EST MOD 30 MIN: CPT | Mod: S$GLB,,, | Performed by: INTERNAL MEDICINE

## 2023-11-14 PROCEDURE — 1159F MED LIST DOCD IN RCRD: CPT | Mod: CPTII,S$GLB,, | Performed by: INTERNAL MEDICINE

## 2023-11-14 PROCEDURE — 3078F DIAST BP <80 MM HG: CPT | Mod: CPTII,S$GLB,, | Performed by: INTERNAL MEDICINE

## 2023-11-14 PROCEDURE — 92567 PR TYMPA2METRY: ICD-10-PCS | Mod: S$GLB,,, | Performed by: AUDIOLOGIST

## 2023-11-14 PROCEDURE — 92557 PR COMPREHENSIVE HEARING TEST: ICD-10-PCS | Mod: S$GLB,,, | Performed by: AUDIOLOGIST

## 2023-11-14 RX ORDER — SEMAGLUTIDE 0.68 MG/ML
0.25 INJECTION, SOLUTION SUBCUTANEOUS
Qty: 3 ML | Refills: 2 | Status: SHIPPED | OUTPATIENT
Start: 2023-11-14 | End: 2024-03-05

## 2023-11-14 NOTE — TELEPHONE ENCOUNTER
----- Message from Gisela Campos sent at 11/14/2023 10:12 AM CST -----  Regarding: Schedule Appointments for Patient  Patient came out of appointment with Dr. Dubose stating that she was to be seen in three months for a follow up with Dr. Dubose.  She was not given an appointment time or date for her follow up.  She also stated that Dr. Dubose would refer her to an audiologist for an appointment for her hearing concerns.  This patient would like for someone to set those appointments up for her and also she would like to be notified once they have been set.      Her contact information is: [phone].  She is looking forward to being contacted by someone soon.

## 2023-11-14 NOTE — PROGRESS NOTES
The patient was referred by Dr. Vi Dubose for a hearing evaluation.    Report from the patient was as follows:    Difficulty Hearing/Understanding - gradual onset of hearing difficulty, no previous hearing evaluation  Tinnitus - negative   History of Loud Noise Exposure - loud music   Family History of Hearing Loss - brother and sister with hearing loss with onset in older age    Otoscopic screening revealed a clear view of TM AU    A hearing evaluation was performed today. Test results indicated a mild to moderately severe sensorineural hearing loss in the right ear and a moderate to severe sensorineural hearing loss in the left ear. Impedance testing showed a Type A tympanogram in each ear, consistent with normal middle ear function.    The recommendations were as follows:    (1)  Hearing aid consult pending medical clearance.  (2)  Ear protection in loud noise   (3)  Hearing evaluation in one year or sooner if hearing decrease is noted       Today's test results and recommendations were discussed with the patient.

## 2023-11-14 NOTE — PROGRESS NOTES
Subjective     Patient ID: Shawanda Desir is a 78 y.o. female.    Chief Complaint: Hearing Problem (Requesting to see someone regarding hearing loss)    Pt here for check up.     Dm- uncontrolled on glipizide.  Can't take metfomrin or januvia,  eye exam utd beena best  Htn- stable  Hyperlipidemia- stable on lipitor      Review of Systems   Constitutional:  Negative for fever.   Respiratory:  Negative for shortness of breath.    Cardiovascular:  Negative for chest pain.   Neurological:  Negative for headaches.          Objective     Physical Exam  Constitutional:       Appearance: Normal appearance.   HENT:      Head: Normocephalic.   Cardiovascular:      Rate and Rhythm: Normal rate and regular rhythm.   Pulmonary:      Effort: Pulmonary effort is normal.      Breath sounds: Normal breath sounds.   Musculoskeletal:         General: Normal range of motion.      Cervical back: Normal range of motion.   Neurological:      General: No focal deficit present.      Mental Status: She is alert.   Psychiatric:         Mood and Affect: Mood normal.         Behavior: Behavior normal.            Assessment and Plan     1. Essential hypertension    2. Type 2 diabetes mellitus with microalbuminuria, without long-term current use of insulin  -     semaglutide (OZEMPIC) 0.25 mg or 0.5 mg (2 mg/3 mL) pen injector; Inject 0.25 mg into the skin every 7 days.  Dispense: 3 mL; Refill: 2    3. Other hyperlipidemia    4. Hearing loss, unspecified hearing loss type, unspecified laterality  -     Ambulatory referral/consult to Audiology; Future; Expected date: 11/21/2023        Dm add ozempic, follow up 3 mo  Hearing loss- referred for audiogram  Htn stable-         Follow up in about 3 months (around 2/14/2024).

## 2024-01-02 NOTE — TELEPHONE ENCOUNTER
Care Due:                  Date            Visit Type   Department     Provider  --------------------------------------------------------------------------------                                ESTABLISHED   [facility]  Last Visit: 11-      PATIENT      MEDICINE       Vi Dubose                              ESTABLISHED   [facility]  Next Visit: 02-      PATIENT      MEDICINE       Vi Dubose                                                            Last  Test          Frequency    Reason                     Performed    Due Date  --------------------------------------------------------------------------------    CBC.........  12 months..  allopurinoL..............  02- 02-    Lipid Panel.  12 months..  atorvastatin.............  02- 02-    Uric Acid...  12 months..  allopurinoL..............  Not Found    Overdue    [employer] Embedded Care Due Messages. Reference number: [id number].   1/02/2024 12:19:43 PM CST

## 2024-01-03 RX ORDER — AMLODIPINE BESYLATE 10 MG/1
10 TABLET ORAL DAILY
Qty: 90 TABLET | Refills: 0 | OUTPATIENT
Start: 2024-01-03

## 2024-01-03 RX ORDER — ALLOPURINOL 100 MG/1
100 TABLET ORAL DAILY
Qty: 90 TABLET | Refills: 0 | Status: SHIPPED | OUTPATIENT
Start: 2024-01-03

## 2024-01-03 RX ORDER — GLIPIZIDE 5 MG/1
5 TABLET, FILM COATED, EXTENDED RELEASE ORAL
Qty: 90 TABLET | Refills: 0 | Status: SHIPPED | OUTPATIENT
Start: 2024-01-03

## 2024-01-03 NOTE — TELEPHONE ENCOUNTER
Refill Routing Note   Medication(s) are not appropriate for processing by [facility] for the following reason(s):        Required labs outdated: Uric Acid    [facility] action(s):  Defer  Quick Discontinue     Requires labs : Yes      Medication Therapy Plan: Amlodipine: discontinued on 8/10/2023 by Vi Dubose MD.;Patient reported not taking on 8/10/2023      Appointments  past 12m or future 3m with PCP    Date Provider   Last Visit   11/14/2023 Vi Dubose MD   Next Visit   2/14/2024 Vi Dubose MD   ED visits in past 90 days: 0        Note composed:9:28 AM 01/03/2024

## 2024-03-10 DIAGNOSIS — E78.49 OTHER HYPERLIPIDEMIA: ICD-10-CM

## 2024-03-10 NOTE — TELEPHONE ENCOUNTER
No care due was identified.  [facility] Embedded Care Due Messages. Reference number: [id number].   3/10/2024 6:58:46 AM CDT

## 2024-03-11 DIAGNOSIS — M25.562 LEFT KNEE PAIN, UNSPECIFIED CHRONICITY: Primary | ICD-10-CM

## 2024-03-11 RX ORDER — ATORVASTATIN CALCIUM 10 MG/1
TABLET, FILM COATED ORAL
Qty: 90 TABLET | Refills: 0 | Status: SHIPPED | OUTPATIENT
Start: 2024-03-11 | End: 2024-05-06 | Stop reason: SDUPTHER

## 2024-03-11 NOTE — TELEPHONE ENCOUNTER
Refill Routing Note   Medication(s) are not appropriate for processing by [facility] for the following reason(s):        Required labs outdated    [facility] action(s):  Defer               Appointments  past 12m or future 3m with PCP    Date Provider   Last Visit   11/14/2023 Vi Dubose MD   Next Visit   3/12/2024 Vi Dubose MD   ED visits in past 90 days: 0        Note composed:10:53 AM 03/11/2024

## 2024-03-12 ENCOUNTER — OFFICE VISIT (OUTPATIENT)
Dept: FAMILY MEDICINE | Facility: CLINIC | Age: 79
End: 2024-03-12
Payer: MEDICARE

## 2024-03-12 ENCOUNTER — OFFICE VISIT (OUTPATIENT)
Dept: ORTHOPEDICS | Facility: CLINIC | Age: 79
End: 2024-03-12
Payer: MEDICARE

## 2024-03-12 ENCOUNTER — HOSPITAL ENCOUNTER (OUTPATIENT)
Dept: RADIOLOGY | Facility: HOSPITAL | Age: 79
Discharge: HOME | End: 2024-03-12
Attending: NURSE PRACTITIONER
Payer: MEDICARE

## 2024-03-12 VITALS
TEMPERATURE: 98 F | HEIGHT: 60 IN | WEIGHT: 190.5 LBS | BODY MASS INDEX: 37.4 KG/M2 | SYSTOLIC BLOOD PRESSURE: 130 MMHG | DIASTOLIC BLOOD PRESSURE: 64 MMHG | HEART RATE: 97 BPM | OXYGEN SATURATION: 99 %

## 2024-03-12 VITALS — WEIGHT: 190 LBS | BODY MASS INDEX: 37.3 KG/M2 | HEIGHT: 60 IN

## 2024-03-12 DIAGNOSIS — E78.49 OTHER HYPERLIPIDEMIA: ICD-10-CM

## 2024-03-12 DIAGNOSIS — I10 ESSENTIAL HYPERTENSION: Primary | ICD-10-CM

## 2024-03-12 DIAGNOSIS — Z96.652 S/P TOTAL KNEE ARTHROPLASTY, LEFT: Primary | ICD-10-CM

## 2024-03-12 DIAGNOSIS — R80.9 TYPE 2 DIABETES MELLITUS WITH MICROALBUMINURIA, WITHOUT LONG-TERM CURRENT USE OF INSULIN: ICD-10-CM

## 2024-03-12 DIAGNOSIS — E11.29 TYPE 2 DIABETES MELLITUS WITH MICROALBUMINURIA, WITHOUT LONG-TERM CURRENT USE OF INSULIN: ICD-10-CM

## 2024-03-12 DIAGNOSIS — M25.562 LEFT KNEE PAIN, UNSPECIFIED CHRONICITY: ICD-10-CM

## 2024-03-12 DIAGNOSIS — R53.83 FATIGUE, UNSPECIFIED TYPE: ICD-10-CM

## 2024-03-12 PROCEDURE — 73562 X-RAY EXAM OF KNEE 3: CPT | Mod: 26,LT,, | Performed by: RADIOLOGY

## 2024-03-12 PROCEDURE — 99999 PR PBB SHADOW E&M-EST. PATIENT-LVL III: CPT | Mod: PBBFAC,,, | Performed by: INTERNAL MEDICINE

## 2024-03-12 PROCEDURE — 99213 OFFICE O/P EST LOW 20 MIN: CPT | Mod: S$GLB,,, | Performed by: NURSE PRACTITIONER

## 2024-03-12 PROCEDURE — 99999 PR PBB SHADOW E&M-EST. PATIENT-LVL III: CPT | Mod: PBBFAC,,, | Performed by: NURSE PRACTITIONER

## 2024-03-12 PROCEDURE — 99214 OFFICE O/P EST MOD 30 MIN: CPT | Mod: S$GLB,,, | Performed by: INTERNAL MEDICINE

## 2024-03-12 PROCEDURE — 73562 X-RAY EXAM OF KNEE 3: CPT | Mod: TC,PO,LT

## 2024-03-12 PROCEDURE — 73560 X-RAY EXAM OF KNEE 1 OR 2: CPT | Mod: 26,59,RT, | Performed by: RADIOLOGY

## 2024-03-12 PROCEDURE — 73560 X-RAY EXAM OF KNEE 1 OR 2: CPT | Mod: TC,59,PO,RT

## 2024-03-12 RX ORDER — AMLODIPINE BESYLATE 10 MG/1
10 TABLET ORAL DAILY
Qty: 30 TABLET | Refills: 11 | Status: SHIPPED | OUTPATIENT
Start: 2024-03-12 | End: 2024-05-06 | Stop reason: SDUPTHER

## 2024-03-12 RX ORDER — AMLODIPINE BESYLATE 10 MG/1
10 TABLET ORAL DAILY
COMMUNITY
Start: 2023-11-05 | End: 2024-03-12 | Stop reason: SDUPTHER

## 2024-03-12 NOTE — PROGRESS NOTES
Subjective     Patient ID: Shawanda Desir is a 78 y.o. female.    Chief Complaint: Follow-up (Patient state the Ozempic was putting weight on her and so she stopped taking the medicatiion about 2 weeks ago)    Pt here for check up    DM-  says ozepmic made her gain weight, stopped taking it . Only on glipizide.  Cannot tolerate metfomrin and januvia  Htn- stable on medication      Follow-up  Pertinent negatives include no chest pain, fever or headaches.     Review of Systems   Constitutional:  Negative for fever.   Respiratory:  Negative for shortness of breath.    Cardiovascular:  Negative for chest pain.   Neurological:  Negative for headaches.          Objective     Physical Exam  Constitutional:       Appearance: Normal appearance.   HENT:      Head: Normocephalic.   Cardiovascular:      Rate and Rhythm: Normal rate and regular rhythm.   Pulmonary:      Effort: Pulmonary effort is normal.      Breath sounds: Normal breath sounds.   Musculoskeletal:         General: Normal range of motion.      Cervical back: Normal range of motion.   Neurological:      General: No focal deficit present.      Mental Status: She is alert.   Psychiatric:         Mood and Affect: Mood normal.         Behavior: Behavior normal.            Assessment and Plan     1. Essential hypertension    2. Type 2 diabetes mellitus with microalbuminuria, without long-term current use of insulin    3. Other hyperlipidemia        Htn- stable  Dm- check A1c  Hyperlipidemia- on lipitor          No follow-ups on file.

## 2024-03-12 NOTE — PROGRESS NOTES
Subjective     Patient ID: Shawanda Desir is a 78 y.o. female.    Chief Complaint: No chief complaint on file.    HPI  Review of Systems       Objective     Physical Exam       Assessment and Plan     1. Essential hypertension    2. Type 2 diabetes mellitus with microalbuminuria, without long-term current use of insulin    3. Other hyperlipidemia        ***         No follow-ups on file.

## 2024-03-12 NOTE — PROGRESS NOTES
Chief Complaint   Patient presents with    Left Knee - Post-op Evaluation       HPI:   This is a 78 y.o. who returns to clinic today in follow-up on her left knee s/p left total knee arthroplasty approximately 1 year ago. She reports she is doing well. Has no pain to her left knee.  No numbness or tingling. No associated signs or symptoms. Here for her one year follow up.     Past Medical History:   Diagnosis Date    Anticoagulant long-term use     Arthritis     Diabetes mellitus     Hyperlipidemia     Hypertension      Past Surgical History:   Procedure Laterality Date    BACK SURGERY      lumbar fusion    BREAST BIOPSY Right     neg--core    ROBOTIC ARTHROPLASTY, KNEE Right 08/29/2022    Procedure: ROBOTIC ARTHROPLASTY, KNEE, TOTAL ATTEMPTED,CONVERTED TO OPEN;  Surgeon: Matthew Patel MD;  Location: [facility] OR;  Service: Orthopedics;  Laterality: Right;    ROBOTIC ARTHROPLASTY, KNEE Left 3/13/2023    Procedure: ROBOTIC ARTHROPLASTY, KNEE, TOTAL - Chi;  Surgeon: Matthew Patel MD;  Location: [facility] OR;  Service: Orthopedics;  Laterality: Left;    TRACHEOSTOMY      age 14--unwashed strawberries- reaction to a chemical-- still eats strawberries     Current Outpatient Medications on File Prior to Visit   Medication Sig Dispense Refill    acetaminophen (TYLENOL) 500 MG tablet Take 2 tablets (1,000 mg total) by mouth every 8 (eight) hours. 90 tablet 0    allopurinoL (ZYLOPRIM) 100 MG tablet Take 1 tablet (100 mg total) by mouth once daily. 90 tablet 0    aspirin (ECOTRIN) 81 MG EC tablet Take 81 mg by mouth once daily.      atorvastatin (LIPITOR) 10 MG tablet TAKE 1 TABLET BY MOUTH EVERY DAY IN THE EVENING 90 tablet 0    glipiZIDE 5 MG TR24 Take 1 tablet (5 mg total) by mouth daily with breakfast. 90 tablet 0    olmesartan (BENICAR) 40 MG tablet Take 1 tablet (40 mg total) by mouth once daily. 90 tablet 3    OZEMPIC 0.25 mg or 0.5 mg (2 mg/3 mL) pen injector INJECT 0.25 MG INTO THE SKIN EVERY 7 DAYS (Patient not taking:  Reported on 3/12/2024) 9 mL 0    triamcinolone acetonide 0.1% (KENALOG) 0.1 % Lotn Apply topically 2 (two) times daily as needed. 60 mL 5     No current facility-administered medications on file prior to visit.     Review of patient's allergies indicates:   Allergen Reactions    Clonidine      Causes chest tightness and leg swelling    Tradjenta [linagliptin]      Chest tightness and leg swelling     Family History   Problem Relation Age of Onset    Cancer Mother     Cancer Father      Social History     Socioeconomic History    Marital status:    Tobacco Use    Smoking status: Never    Smokeless tobacco: Never   Substance and Sexual Activity    Alcohol use: No    Drug use: No     Social Determinants of Health     Financial Resource Strain: Low Risk  (10/17/2022)    Overall Financial Resource Strain (CARDIA)     Difficulty of Paying Living Expenses: Not hard at all   Food Insecurity: No Food Insecurity (10/17/2022)    Hunger Vital Sign     Worried About Running Out of Food in the Last Year: Never true     Ran Out of Food in the Last Year: Never true   Transportation Needs: No Transportation Needs (10/17/2022)    PRAPARE - Transportation     Lack of Transportation (Medical): No     Lack of Transportation (Non-Medical): No   Physical Activity: Insufficiently Active (10/17/2022)    Exercise Vital Sign     Days of Exercise per Week: 3 days     Minutes of Exercise per Session: 40 min   Stress: No Stress Concern Present (10/17/2022)    [employer] [place] [employer] - Occupational Stress Questionnaire     Feeling of Stress : Not at all   Social Connections: Moderately Integrated (10/17/2022)    Social Connection and Isolation Panel [NHANES]     Frequency of Communication with Friends and Family: More than three times a week     Frequency of Social Gatherings with Friends and Family: Once a week     Attends Spiritism Services: More than 4 times per year     Active Member of Clubs or Organizations: Yes      Attends Club or Organization Meetings: More than 4 times per year     Marital Status:    Housing Stability: Unknown (10/17/2022)    Housing Stability Vital Sign     Unable to Pay for Housing in the Last Year: No     Unstable Housing in the Last Year: No       Review of Systems:  Constitutional:  Denies fever or chills   Eyes:  Denies change in visual acuity   HENT:  Denies nasal congestion or sore throat   Respiratory:  Denies cough or shortness of breath   Cardiovascular:  Denies chest pain or edema   GI:  Denies abdominal pain, nausea, vomiting, bloody stools or diarrhea   :  Denies dysuria   Integument:  Denies rash   Neurologic:  Denies headache, focal weakness or sensory changes   Endocrine:  Denies polyuria or polydipsia   Lymphatic:  Denies swollen glands   Psychiatric:  Denies depression or anxiety     Physical Exam:   Constitutional:  Well developed, well nourished, no acute distress, non-toxic appearance   Integument:  Well hydrated  Neurologic:  Alert & oriented x 3    Psychiatric:  Speech and behavior appropriate     Bilateral Knee Exam    left Knee Exam   Tenderness   The patient is experiencing no tenderness in the medial joint line.  Incision healed.     Range of Motion   Flexion: normal     Muscle Strength   The patient has normal bilateral knee strength.    Tests   Aide:  Medial - negative   Lachman:  Anterior - negative      Varus: negative  Valgus: negative  Patellar Apprehension: negative      Other   Erythema: absent  Sensation: normal  Pulse: present  Swelling: mild    Right Knee exam   Knee exam performed same as contralateral side and is normal      X-rays were performed today, personally reviewed by me and findings discussed with the patient.  3 views of the left knee show implants intact without any evidence of loosening.         S/P total knee arthroplasty, left    She is doing well 1 year post op.   Rtc as needed.

## 2024-03-14 ENCOUNTER — PATIENT OUTREACH (OUTPATIENT)
Dept: ADMINISTRATIVE | Facility: HOSPITAL | Age: 79
End: 2024-03-14
Payer: MEDICARE

## 2024-03-14 NOTE — PROGRESS NOTES
Population Health Chart Review & Patient Outreach Details      Additional [facility] Health Notes:               Updates Requested / Reviewed:      Updated Care Coordination Note, Care Everywhere, , External Sources: [facility] and [facility], and Immunizations Reconciliation Completed or Queried: [facility] Topics Overdue:      [facility] Score: 2     Urine Screening  Eye Exam    Influenza Vaccine  Tetanus Vaccine  Shingles/Zoster Vaccine  RSV Vaccine                  Health Maintenance Topic(s) Outreach Outcomes & Actions Taken:    Eye Exam - Outreach Outcomes & Actions Taken  : External Records Requested & Care Team Updated if Applicable

## 2024-03-14 NOTE — LETTER
AUTHORIZATION FOR RELEASE OF   CONFIDENTIAL INFORMATION    Dear Contacts-[facility]  ,    We are seeing Shawanda Desir, date of birth 1945, in the clinic at [facility]. Vi Dubose MD is the patient's PCP. Shawanda Desir has an outstanding lab/procedure at the time we reviewed her chart. In order to help keep her health information updated, she has authorized us to request the following medical record(s):        (  )  MAMMOGRAM                                      (  )  COLONOSCOPY      (  )  PAP SMEAR                                          (  )  OUTSIDE LAB RESULTS     (  )  DEXA SCAN                                          ( X )  EYE EXAM            (  )  FOOT EXAM                                          (  )  ENTIRE RECORD     (  )  OUTSIDE IMMUNIZATIONS                 (  )  _______________         Please fax records to [facility], Nicaud, Elise J., MD, [phone]     If you have any questions, please contact [facility]          Patient Name: Shawanda Desir  : 1945  Patient Phone #: [phone]                             Shawanda Desir  MRN: [id number]  : 1945  Age: 77 y.o.  Sex: female         Patient/Legal Guardian Signature  This signature was collected at 2023    Shawanda Desir     Self  _______________________________   Printed Name/Relationship to Patient      Consent for Examination and Treatment: I hereby authorize the providers and employees of [facility] ([facility]) to provide medical treatment/services which includes, but is not limited to, performing and administering tests and diagnostic procedures that are deemed necessary, including, but not limited to, imaging examinations, blood tests and other laboratory procedures as may be required by the hospital, clinic, or may be ordered by my physician(s) or persons working under the general and/or special instructions of my physician(s).      I understand and  agree that this consent covers all authorized persons, including but not limited to physicians, residents, nurse practitioners, physicians' assistants, specialists, consultants, student nurses, and independently contracted physicians, who are called upon by the physician in charge, to carry out the diagnostic procedures and medical or surgical treatment.     I hereby authorize [facility] to retain or dispose of any specimens or tissue, should there be such remaining from any test or procedure.     I hereby authorize and give consent for [facility] providers and employees to take photographs, images or videotapes of such diagnostic, surgical or treatment procedures of Patient as may be required by [facility] or as may be ordered by a physician. I further acknowledge and agree that [facility] may use cameras or other devices for patient monitoring.     I am aware that the practice of medicine is not an exact science, and I acknowledge that no guarantees have been made to me as to the outcome of any tests, procedures or treatment.     Authorization for Release of Information: I understand that my insurance company and/or their agents may need information necessary to make determinations about payment/reimbursement. I hereby provide authorization to release to all insurance companies, their successors, assignees, other parties with whom they may have contracted, or others acting on their behalf, that are involved with payment for any hospital and/or clinic charges incurred by the patient, any information that they request and deem necessary for payment/reimbursement, and/or quality review.  I further authorize the release of my health information to physicians or other health care practitioners on staff who are involved in my health care now and in the future, and to other health care providers, entities, or institutions for the purpose of my continued care and treatment, including referrals.     REGISTRATION AUTHORIZATION  Form  No. 59860 (Rev. 7/13/2022)       Medicare Patient's Certification and Authorization to Release Information and Payment Request:  I certify that the information given by me in applying for payment under Title XVIII of the Social Security Act is correct. I authorize any terry of medical or other information about me to release to the Social SecurityAdministration, or its intermediaries or carriers, any information needed for this or a related Medicare claim. I request that payment of authorized benefits be made on my behalf.     Assignment of Insurance Benefits:   I hereby authorize any and all insurance companies, health plans, defined   benefit plans, health insurers or any entity that is or may be responsible for payment of my medical expenses to pay all hospital and medical benefits now due, and to become due and payable to me under any hospital benefits, sick benefits, injury benefits or any other benefit for services rendered to me, including Major Medical Benefits, direct to [facility] and all independently contracted physicians. I assign any and all rights that I may have against any and all insurance companies, health plans, defined benefit plans, health insurers or any entity that is or may be responsible for payment of my medical expenses, including, but not limited to any right to appeal a denial of a claim, any right to bring any action, lawsuit, administrative proceeding, or other cause of action on my behalf. I specifically assign my right to pursue litigation against any and all insurance companies, health plans, defined benefit plans, health insurers or any entity that is or may be responsible for payment of my medical expenses based upon a refusal to pay charges.            E. Valuables: It is understood and agreed that [facility] is not liable for the damage to or loss of any money, jewelry,   documents, dentures, eye glasses, hearing aids, prosthetics, or other property of value.     F. Computer  Equipment: I understand and agree that should I choose to use computer equipment owned by [facility] or if I choose to access the Internet via [facility]s network, I do so at my own risk. [facility] is not responsible for any damage to my computer equipment or to any damages of any type that might arise from my loss of equipment or data.     G. Acceptance of Financial Responsibility:  I agree that in consideration of the services and   supplies that have been   or will be furnished to the patient, I am hereby obligated to pay all charges made for or on the account of the patient according to the standard rates (in effect at the time the services and supplies are delivered) established by [facility], including its Patient Financial Assistance Policy to the extent it is applicable. I understand that I am responsible for all charges, or portions thereof, not covered by insurance or other sources. Patient refunds will be distributed only after balances at all [facility] facilities are paid.     H. Communication Authorization:  I hereby authorize [facility] and its representatives, along with any billing service   or  who may work on their behalf, to contact me on   my cell phone and/or home phone using pre- recorded messages, artificial voice messages, automatic telephone dialing devices or other computer assisted technology, or by electronic      mail, text messaging, or by any other form of electronic communication. This includes, but is not limited to, appointment reminders, yearly physical exam reminders, preventive care reminders, patient campaigns, welcome calls, and calls about account balances on my account or any account on which I am listed as a guarantor. I understand I have the right to opt out of these communications at any time.      Relationship  Between  Facility and  Provider:      I understand that some, but not all, providers furnishing services to the patient are not employees or agents of [facility].  The patient is under the care and supervision of his/her attending physician, and it is the responsibility of the facility and its nursing staff to carry out the instructions of such physicians. It is the responsibility of the patient's physician/designee to obtain the patient's informed consent, when required, for medical or surgical treatment, special diagnostic or therapeutic procedures, or hospital services rendered for the patient under the special instructions of the physician/designee.     REGISTRATION AUTHORIZATION  Form No. 22349 (Rev. 7/13/2022)      Notice of Privacy Practices: I acknowledge I have received a copy of [facility]'s Notice of Privacy Practices.     Facility  Directory: I have discussed with the organization my desire to be either included or excluded  in the facility directory in the event of my being an inpatient at an [facility] facility. I understand that if my choice is to opt-out of being identified in the facility directory that the facility will not provide any information about me such as my condition (e.g. fair, stable, etc.) or my location in the facility (e.g., room number, department).     Immunizations: [facility] shares immunization information with state sponsored health departments to help you and your doctor keep track of your immunization records. By signing, you consent to have this information shared with the health department in your state:                                [place] - LINKS ([place] Immunization Network for Kids Statewide)                                [place] - MIIX ([place] Immunization Information eXchange)                                [place] - ImmPRINT (Immunization Patient Registry with Integrated Technology)     TERM: This authorization is valid for this and subsequent care/treatment I receive at [facility] and will remain valid unless/until revoked in writing by me.     [facility]: As used in this document, [facility] means all  [facility] owned and managed facilities, including, but not limited to, all health centers, surgery centers, clinics, urgent care centers, and hospitals.         [facility] complies with applicable Federal civil rights laws and does not discriminate on the basis of race, color, national origin, age, disability, or sex.  ATENCIÓN: si habla español, tiene a velasco disposición servicios gratuitos de asistencia lingüística. Rob al [phone].  CHÚ Ý: N?u b?n nói Ti?ng Vi?t, có các d?ch v? h? tr? ngôn ng? mi?n phí dành cho b?n. G?i s? [phone].        REGISTRATION AUTHORIZATION  Form No. 67766 (Rev. 7/13/2022)

## 2024-03-20 ENCOUNTER — LAB VISIT (OUTPATIENT)
Dept: LAB | Facility: HOSPITAL | Age: 79
End: 2024-03-20
Attending: INTERNAL MEDICINE
Payer: MEDICARE

## 2024-03-20 DIAGNOSIS — R80.9 TYPE 2 DIABETES MELLITUS WITH MICROALBUMINURIA, WITHOUT LONG-TERM CURRENT USE OF INSULIN: ICD-10-CM

## 2024-03-20 DIAGNOSIS — R53.83 FATIGUE, UNSPECIFIED TYPE: ICD-10-CM

## 2024-03-20 DIAGNOSIS — I10 ESSENTIAL HYPERTENSION: ICD-10-CM

## 2024-03-20 DIAGNOSIS — E11.29 TYPE 2 DIABETES MELLITUS WITH MICROALBUMINURIA, WITHOUT LONG-TERM CURRENT USE OF INSULIN: ICD-10-CM

## 2024-03-20 LAB
ALBUMIN SERPL BCP-MCNC: 3.4 G/DL (ref 3.5–5.2)
ALP SERPL-CCNC: 192 U/L (ref 55–135)
ALT SERPL W/O P-5'-P-CCNC: 9 U/L (ref 10–44)
ANION GAP SERPL CALC-SCNC: 9 MMOL/L (ref 8–16)
AST SERPL-CCNC: 17 U/L (ref 10–40)
BASOPHILS # BLD AUTO: 0.04 K/UL (ref 0–0.2)
BASOPHILS NFR BLD: 0.4 % (ref 0–1.9)
BILIRUB SERPL-MCNC: 0.4 MG/DL (ref 0.1–1)
BUN SERPL-MCNC: 9 MG/DL (ref 8–23)
CALCIUM SERPL-MCNC: 9.9 MG/DL (ref 8.7–10.5)
CHLORIDE SERPL-SCNC: 106 MMOL/L (ref 95–110)
CHOLEST SERPL-MCNC: 172 MG/DL (ref 120–199)
CHOLEST/HDLC SERPL: 4.2 {RATIO} (ref 2–5)
CO2 SERPL-SCNC: 25 MMOL/L (ref 23–29)
CREAT SERPL-MCNC: 0.7 MG/DL (ref 0.5–1.4)
DIFFERENTIAL METHOD BLD: ABNORMAL
EOSINOPHIL # BLD AUTO: 0.6 K/UL (ref 0–0.5)
EOSINOPHIL NFR BLD: 5.6 % (ref 0–8)
ERYTHROCYTE [DISTWIDTH] IN BLOOD BY AUTOMATED COUNT: 15.7 % (ref 11.5–14.5)
EST. GFR  (NO RACE VARIABLE): >60 ML/MIN/1.73 M^2
ESTIMATED AVG GLUCOSE: 134 MG/DL (ref 68–131)
GLUCOSE SERPL-MCNC: 129 MG/DL (ref 70–110)
HBA1C MFR BLD: 6.3 % (ref 4–5.6)
HCT VFR BLD AUTO: 37 % (ref 37–48.5)
HDLC SERPL-MCNC: 41 MG/DL (ref 40–75)
HDLC SERPL: 23.8 % (ref 20–50)
HGB BLD-MCNC: 11.7 G/DL (ref 12–16)
IMM GRANULOCYTES # BLD AUTO: 0.02 K/UL (ref 0–0.04)
IMM GRANULOCYTES NFR BLD AUTO: 0.2 % (ref 0–0.5)
LDLC SERPL CALC-MCNC: 112.4 MG/DL (ref 63–159)
LYMPHOCYTES # BLD AUTO: 4.5 K/UL (ref 1–4.8)
LYMPHOCYTES NFR BLD: 44.3 % (ref 18–48)
MCH RBC QN AUTO: 27.9 PG (ref 27–31)
MCHC RBC AUTO-ENTMCNC: 31.6 G/DL (ref 32–36)
MCV RBC AUTO: 88 FL (ref 82–98)
MONOCYTES # BLD AUTO: 0.8 K/UL (ref 0.3–1)
MONOCYTES NFR BLD: 8 % (ref 4–15)
NEUTROPHILS # BLD AUTO: 4.2 K/UL (ref 1.8–7.7)
NEUTROPHILS NFR BLD: 41.5 % (ref 38–73)
NONHDLC SERPL-MCNC: 131 MG/DL
NRBC BLD-RTO: 0 /100 WBC
PLATELET # BLD AUTO: 321 K/UL (ref 150–450)
PMV BLD AUTO: 11.7 FL (ref 9.2–12.9)
POTASSIUM SERPL-SCNC: 4.4 MMOL/L (ref 3.5–5.1)
PROT SERPL-MCNC: 7.6 G/DL (ref 6–8.4)
RBC # BLD AUTO: 4.19 M/UL (ref 4–5.4)
SODIUM SERPL-SCNC: 140 MMOL/L (ref 136–145)
TRIGL SERPL-MCNC: 93 MG/DL (ref 30–150)
TSH SERPL DL<=0.005 MIU/L-ACNC: 2.12 UIU/ML (ref 0.4–4)
WBC # BLD AUTO: 10.14 K/UL (ref 3.9–12.7)

## 2024-03-20 PROCEDURE — 36415 COLL VENOUS BLD VENIPUNCTURE: CPT | Mod: PO | Performed by: INTERNAL MEDICINE

## 2024-03-20 PROCEDURE — 80061 LIPID PANEL: CPT | Performed by: INTERNAL MEDICINE

## 2024-03-20 PROCEDURE — 85025 COMPLETE CBC W/AUTO DIFF WBC: CPT | Performed by: INTERNAL MEDICINE

## 2024-03-20 PROCEDURE — 83036 HEMOGLOBIN GLYCOSYLATED A1C: CPT | Performed by: INTERNAL MEDICINE

## 2024-03-20 PROCEDURE — 84443 ASSAY THYROID STIM HORMONE: CPT | Performed by: INTERNAL MEDICINE

## 2024-03-20 PROCEDURE — 80053 COMPREHEN METABOLIC PANEL: CPT | Performed by: INTERNAL MEDICINE

## 2024-04-01 RX ORDER — ALLOPURINOL 100 MG/1
100 TABLET ORAL
Qty: 90 TABLET | Refills: 0 | Status: SHIPPED | OUTPATIENT
Start: 2024-04-01 | End: 2024-05-06 | Stop reason: SDUPTHER

## 2024-04-01 NOTE — TELEPHONE ENCOUNTER
Refill Routing Note   Medication(s) are not appropriate for processing by [facility] for the following reason(s):        Required labs outdated    [facility] action(s):  Defer               Appointments  past 12m or future 3m with PCP    Date Provider   Last Visit   3/12/2024 Vi Dubose MD   Next Visit   7/16/2024 Vi Dubose MD   ED visits in past 90 days: 0        Note composed:6:17 PM 04/01/2024

## 2024-04-01 NOTE — TELEPHONE ENCOUNTER
No care due was identified.  [facility] Embedded Care Due Messages. Reference number: [id number].   4/01/2024 12:30:08 AM CDT

## 2024-04-29 ENCOUNTER — PATIENT OUTREACH (OUTPATIENT)
Dept: ADMINISTRATIVE | Facility: HOSPITAL | Age: 79
End: 2024-04-29
Payer: MEDICARE

## 2024-04-29 NOTE — LETTER
AUTHORIZATION FOR RELEASE OF   CONFIDENTIAL INFORMATION    Dear Contacts-[facility]   ** 2ND REQUEST    We are seeing Shawanda Desir, date of birth 1945, in the clinic at [facility]. Vi Dubose MD is the patient's PCP. Shawanda Desir has an outstanding lab/procedure at the time we reviewed her chart. In order to help keep her health information updated, she has authorized us to request the following medical record(s):        (  )  MAMMOGRAM                                      (  )  COLONOSCOPY      (  )  PAP SMEAR                                          (  )  OUTSIDE LAB RESULTS     (  )  DEXA SCAN                                          ( X )  EYE EXAM            (  )  FOOT EXAM                                          (  )  ENTIRE RECORD     (  )  OUTSIDE IMMUNIZATIONS                 (  )  _______________         Please fax records to [facility], Nicaud, Elise J., MD, [phone]     If you have any questions, please contact [facility]      Patient Name: Shawanda Desir  : 1945  Patient Phone #: [phone]        Patient/Legal Guardian Signature  This signature was collected at 2023    Shawanda Desir     Self  _______________________________   Printed Name/Relationship to Patient      Consent for Examination and Treatment: I hereby authorize the providers and employees of [facility] ([facility]) to provide medical treatment/services which includes, but is not limited to, performing and administering tests and diagnostic procedures that are deemed necessary, including, but not limited to, imaging examinations, blood tests and other laboratory procedures as may be required by the hospital, clinic, or may be ordered by my physician(s) or persons working under the general and/or special instructions of my physician(s).      I understand and agree that this consent covers all authorized persons, including but not limited to  physicians, residents, nurse practitioners, physicians' assistants, specialists, consultants, student nurses, and independently contracted physicians, who are called upon by the physician in charge, to carry out the diagnostic procedures and medical or surgical treatment.     I hereby authorize [facility] to retain or dispose of any specimens or tissue, should there be such remaining from any test or procedure.     I hereby authorize and give consent for [facility] providers and employees to take photographs, images or videotapes of such diagnostic, surgical or treatment procedures of Patient as may be required by [facility] or as may be ordered by a physician. I further acknowledge and agree that [facility] may use cameras or other devices for patient monitoring.     I am aware that the practice of medicine is not an exact science, and I acknowledge that no guarantees have been made to me as to the outcome of any tests, procedures or treatment.     Authorization for Release of Information: I understand that my insurance company and/or their agents may need information necessary to make determinations about payment/reimbursement. I hereby provide authorization to release to all insurance companies, their successors, assignees, other parties with whom they may have contracted, or others acting on their behalf, that are involved with payment for any hospital and/or clinic charges incurred by the patient, any information that they request and deem necessary for payment/reimbursement, and/or quality review.  I further authorize the release of my health information to physicians or other health care practitioners on staff who are involved in my health care now and in the future, and to other health care providers, entities, or institutions for the purpose of my continued care and treatment, including referrals.     REGISTRATION AUTHORIZATION  Form No. 90196 (Rev. 7/13/2022)       Medicare Patient's Certification and Authorization to  Release Information and Payment Request:  I certify that the information given by me in applying for payment under Title XVIII of the Social Security Act is correct. I authorize any terry of medical or other information about me to release to the Social SecurityAdministration, or its intermediaries or carriers, any information needed for this or a related Medicare claim. I request that payment of authorized benefits be made on my behalf.     Assignment of Insurance Benefits:   I hereby authorize any and all insurance companies, health plans, defined   benefit plans, health insurers or any entity that is or may be responsible for payment of my medical expenses to pay all hospital and medical benefits now due, and to become due and payable to me under any hospital benefits, sick benefits, injury benefits or any other benefit for services rendered to me, including Major Medical Benefits, direct to [facility] and all independently contracted physicians. I assign any and all rights that I may have against any and all insurance companies, health plans, defined benefit plans, health insurers or any entity that is or may be responsible for payment of my medical expenses, including, but not limited to any right to appeal a denial of a claim, any right to bring any action, lawsuit, administrative proceeding, or other cause of action on my behalf. I specifically assign my right to pursue litigation against any and all insurance companies, health plans, defined benefit plans, health insurers or any entity that is or may be responsible for payment of my medical expenses based upon a refusal to pay charges.            E. Valuables: It is understood and agreed that [facility] is not liable for the damage to or loss of any money, jewelry,   documents, dentures, eye glasses, hearing aids, prosthetics, or other property of value.     F. Computer Equipment: I understand and agree that should I choose to use computer equipment owned by  [facility] or if I choose to access the Internet via [facility]s network, I do so at my own risk. [facility] is not responsible for any damage to my computer equipment or to any damages of any type that might arise from my loss of equipment or data.     G. Acceptance of Financial Responsibility:  I agree that in consideration of the services and   supplies that have been   or will be furnished to the patient, I am hereby obligated to pay all charges made for or on the account of the patient according to the standard rates (in effect at the time the services and supplies are delivered) established by [facility], including its Patient Financial Assistance Policy to the extent it is applicable. I understand that I am responsible for all charges, or portions thereof, not covered by insurance or other sources. Patient refunds will be distributed only after balances at all [facility] facilities are paid.     H. Communication Authorization:  I hereby authorize [facility] and its representatives, along with any billing service   or  who may work on their behalf, to contact me on   my cell phone and/or home phone using pre- recorded messages, artificial voice messages, automatic telephone dialing devices or other computer assisted technology, or by electronic      mail, text messaging, or by any other form of electronic communication. This includes, but is not limited to, appointment reminders, yearly physical exam reminders, preventive care reminders, patient campaigns, welcome calls, and calls about account balances on my account or any account on which I am listed as a guarantor. I understand I have the right to opt out of these communications at any time.      Relationship  Between  Facility and  Provider:      I understand that some, but not all, providers furnishing services to the patient are not employees or agents of [facility]. The patient is under the care and supervision of his/her attending physician, and it is the  responsibility of the facility and its nursing staff to carry out the instructions of such physicians. It is the responsibility of the patient's physician/designee to obtain the patient's informed consent, when required, for medical or surgical treatment, special diagnostic or therapeutic procedures, or hospital services rendered for the patient under the special instructions of the physician/designee.     REGISTRATION AUTHORIZATION  Form No. 91125 (Rev. 7/13/2022)      Notice of Privacy Practices: I acknowledge I have received a copy of [facility]'s Notice of Privacy Practices.     Facility  Directory: I have discussed with the organization my desire to be either included or excluded  in the facility directory in the event of my being an inpatient at an [facility] facility. I understand that if my choice is to opt-out of being identified in the facility directory that the facility will not provide any information about me such as my condition (e.g. fair, stable, etc.) or my location in the facility (e.g., room number, department).     Immunizations: [facility] shares immunization information with state sponsored health departments to help you and your doctor keep track of your immunization records. By signing, you consent to have this information shared with the health department in your state:                                [place] - LINKS ([place] Immunization Network for Kids Statewide)                                [place] - MIIX ([place] Immunization Information eXchange)                                [place] - ImmPRINT (Immunization Patient Registry with Integrated Technology)     TERM: This authorization is valid for this and subsequent care/treatment I receive at [facility] and will remain valid unless/until revoked in writing by me.     [facility]: As used in this document, [facility] means all [facility] owned and managed facilities, including, but not limited to, all health centers, surgery  centers, clinics, urgent care centers, and hospitals.         [facility] complies with applicable Federal civil rights laws and does not discriminate on the basis of race, color, national origin, age, disability, or sex.  ATENCIÓN: si jorge luis blasdarby, tiene a velasco disposición servicios gratuitos de asistencia lingüística. Llame al [phone].  [facility] Ý: N?u b?n nói Ti?ng Vi?t, có các d?ch v? h? tr? ngôn ng? mi?n phí dành cho b?n. G?i s? [phone].        REGISTRATION AUTHORIZATION  Form No. 30458 (Rev. 7/13/2022)

## 2024-04-29 NOTE — PROGRESS NOTES
Population Health Chart Review & Patient Outreach Details      Additional [facility] Health Notes:               Updates Requested / Reviewed:      Other    EYE EXAM REQUESTED         Health Maintenance Topics Overdue:      [facility] Score: 2     Urine Screening  Eye Exam    Influenza Vaccine  Tetanus Vaccine  Shingles/Zoster Vaccine  RSV Vaccine                  Health Maintenance Topic(s) Outreach Outcomes & Actions Taken:    Eye Exam - Outreach Outcomes & Actions Taken  : External Records Requested & Care Team Updated if Applicable

## 2024-05-02 ENCOUNTER — PATIENT OUTREACH (OUTPATIENT)
Dept: ADMINISTRATIVE | Facility: HOSPITAL | Age: 79
End: 2024-05-02
Payer: MEDICARE

## 2024-05-02 NOTE — PROGRESS NOTES
Population Health Chart Review & Patient Outreach Details      Additional [facility] Notes:               Updates Requested / Reviewed:      Updated Care Coordination Note         Health Maintenance Topics Overdue:      [facility] Score: 1     Urine Screening    Tetanus Vaccine  Shingles/Zoster Vaccine  RSV Vaccine                  Health Maintenance Topic(s) Outreach Outcomes & Actions Taken:    Eye Exam - Outreach Outcomes & Actions Taken  : Diabetic Eye External Records Uploaded, Care Team & History Updated if Applicable

## 2024-05-03 ENCOUNTER — TELEPHONE (OUTPATIENT)
Dept: FAMILY MEDICINE | Facility: CLINIC | Age: 79
End: 2024-05-03
Payer: MEDICARE

## 2024-05-03 NOTE — TELEPHONE ENCOUNTER
----- Message from Iraida Sher sent at 5/3/2024  3:55 PM CDT -----  Contact: self  Pt just wanted to let you know that since she changed her insur to [employer] you are no longer in network for her so she has changed to a doctor that is in network.  Thank you for everything

## 2024-05-06 PROBLEM — I50.30 DIASTOLIC CONGESTIVE HEART FAILURE: Status: RESOLVED | Noted: 2021-03-03 | Resolved: 2024-05-06

## 2024-05-06 PROBLEM — I77.1 STRICTURE OF ARTERY: Status: RESOLVED | Noted: 2021-03-03 | Resolved: 2024-05-06

## 2024-05-06 PROBLEM — I20.89 OTHER FORMS OF ANGINA PECTORIS: Status: RESOLVED | Noted: 2021-03-03 | Resolved: 2024-05-06

## 2024-05-07 NOTE — PROGRESS NOTES
Kasi Foss is a 39 y.o.   male and presents with   Chief Complaint   Patient presents with    Annual Exam         .  Subjective:      Pt admits to binge drinking in the past, hence, the elevated lfts.  Pt reports less drinking  Pt requests podiatry referral for abn toenails and foot pain  Pt is interested in the Inspire procedure  Pt was eval in ED for acute on chronic lbp on left side. Pain responded to steroid rx.    PMH- ALBERT- not tolerating mask well    PSH- none    SH- single   Works as a ,  lives w sister   + sex active, condoms most of the time ~10 partners/5 yrs, last std test >1 year   + tobacco    FH father  58 Hodgekins lymphoma/ALBERT   mother alive 62 healthy   4 siblings     HM  Immunizations Tdap ~  in ED  Eye care n/a  Dental care never  Exercise none  Labs-elv liver enzymes      Review of Systems  Review of systems (12) negative, except noted above.      History reviewed. No pertinent past medical history.  History reviewed. No pertinent surgical history.  Social History     Socioeconomic History    Marital status: SINGLE   Tobacco Use    Smoking status: Every Day     Packs/day: 1.00     Years: 5.00     Pack years: 5.00     Types: Cigarettes    Smokeless tobacco: Never   Vaping Use    Vaping Use: Never used   Substance and Sexual Activity    Alcohol use: Yes     Comment: occassionalyy    Drug use: Never    Sexual activity: Yes     Partners: Female     Birth control/protection: None   Social History Narrative    ** Merged History Encounter **            Current Outpatient Medications:     diclofenac (VOLTAREN) 75 MG EC tablet, Take 1 tablet by mouth 2 times daily, Disp: 30 tablet, Rfl: 0    lidocaine (LIDODERM) 5 %, Place 1 patch onto the skin daily 12 hours on, 12 hours off., Disp: 30 patch, Rfl: 0    No Known Allergies    Objective:  Visit Vitals  Vitals:    24 1401   BP: 118/82   Pulse: 89   Resp: 16   Temp: 98.3 °F (36.8 °C)   SpO2: 98%       Physical Exam:   General  See IE in POC

## 2025-03-25 NOTE — TELEPHONE ENCOUNTER
Called patient back. Patient did not answer and I couldn't leave a vm because her inbox has not been set up yet.    [facility] provides services at a reduced cost to those who are determined to be eligible through [facility]’s financial assistance program. Information regarding [facility]’s financial assistance program can be found by going to [email or web address] or by calling [phone].